# Patient Record
Sex: FEMALE | Race: WHITE | NOT HISPANIC OR LATINO | Employment: PART TIME | URBAN - METROPOLITAN AREA
[De-identification: names, ages, dates, MRNs, and addresses within clinical notes are randomized per-mention and may not be internally consistent; named-entity substitution may affect disease eponyms.]

---
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
## 2017-03-22 LAB
CHOLEST SERPL-MCNC: 196 MG/DL (ref 0–199)
HDLC SERPL-MCNC: 42 MG/DL
LDLC SERPL CALC-MCNC: 119 MG/DL (ref 19–130)
TRIGL SERPL-MCNC: 176 MG/DL (ref 4–149)

## 2017-04-13 LAB
CREAT SERPL-MCNC: 0.6 MG/DL (ref 0.55–1.02)
GFR SERPL CREATININE-BSD FRML MDRD: >60 ML/MIN/1.73M2
TSH SERPL-ACNC: 2.94 UIU/ML (ref 0.3–4.5)

## 2019-02-15 LAB
CHOLEST SERPL-MCNC: 153 MG/DL (ref 0–199)
CREAT SERPL-MCNC: 0.69 MG/DL (ref 0.55–1.02)
GFR SERPL CREATININE-BSD FRML MDRD: >60 ML/MIN/1.73M2
HDLC SERPL-MCNC: 43 MG/DL
LDLC SERPL CALC-MCNC: 100 MG/DL (ref 19–130)
NONHDLC SERPL-MCNC: 110 MG/DL (ref 0–159)
TRIGL SERPL-MCNC: 52 MG/DL (ref 4–149)
TSH SERPL-ACNC: 5.01 UIU/ML (ref 0.3–4.5)

## 2019-05-17 ENCOUNTER — TRANSFERRED RECORDS (OUTPATIENT)
Dept: HEALTH INFORMATION MANAGEMENT | Facility: CLINIC | Age: 24
End: 2019-05-17

## 2019-05-17 LAB — HBA1C MFR BLD: 8.1 % (ref 4–5.6)

## 2019-05-24 ENCOUNTER — OFFICE VISIT (OUTPATIENT)
Dept: PEDIATRICS | Facility: CLINIC | Age: 24
End: 2019-05-24
Payer: COMMERCIAL

## 2019-05-24 VITALS
BODY MASS INDEX: 29.03 KG/M2 | HEART RATE: 75 BPM | HEIGHT: 69 IN | WEIGHT: 196 LBS | OXYGEN SATURATION: 98 % | DIASTOLIC BLOOD PRESSURE: 74 MMHG | TEMPERATURE: 96.8 F | SYSTOLIC BLOOD PRESSURE: 107 MMHG

## 2019-05-24 DIAGNOSIS — K90.0 CELIAC DISEASE: ICD-10-CM

## 2019-05-24 DIAGNOSIS — E10.9 TYPE 1 DIABETES MELLITUS WITHOUT COMPLICATION (H): ICD-10-CM

## 2019-05-24 DIAGNOSIS — E06.3 HYPOTHYROIDISM DUE TO HASHIMOTO'S THYROIDITIS: ICD-10-CM

## 2019-05-24 DIAGNOSIS — I82.491 ACUTE DEEP VEIN THROMBOSIS (DVT) OF OTHER SPECIFIED VEIN OF RIGHT LOWER EXTREMITY (H): Primary | ICD-10-CM

## 2019-05-24 DIAGNOSIS — R76.0 ANTIPHOSPHOLIPID ANTIBODY POSITIVE: ICD-10-CM

## 2019-05-24 DIAGNOSIS — Z83.2 FAMILY HISTORY OF CLOTTING DISORDER: ICD-10-CM

## 2019-05-24 LAB
ALBUMIN SERPL-MCNC: 3.7 G/DL (ref 3.4–5)
ALP SERPL-CCNC: 101 U/L (ref 40–150)
ALT SERPL W P-5'-P-CCNC: 24 U/L (ref 0–50)
ANION GAP SERPL CALCULATED.3IONS-SCNC: 4 MMOL/L (ref 3–14)
AST SERPL W P-5'-P-CCNC: 18 U/L (ref 0–45)
BASOPHILS # BLD AUTO: 0 10E9/L (ref 0–0.2)
BASOPHILS NFR BLD AUTO: 0.7 %
BILIRUB SERPL-MCNC: 0.3 MG/DL (ref 0.2–1.3)
BUN SERPL-MCNC: 9 MG/DL (ref 7–30)
CALCIUM SERPL-MCNC: 9.5 MG/DL (ref 8.5–10.1)
CHLORIDE SERPL-SCNC: 105 MMOL/L (ref 94–109)
CO2 SERPL-SCNC: 29 MMOL/L (ref 20–32)
CREAT SERPL-MCNC: 0.66 MG/DL (ref 0.52–1.04)
DIFFERENTIAL METHOD BLD: NORMAL
EOSINOPHIL # BLD AUTO: 0.3 10E9/L (ref 0–0.7)
EOSINOPHIL NFR BLD AUTO: 5.8 %
ERYTHROCYTE [DISTWIDTH] IN BLOOD BY AUTOMATED COUNT: 12.3 % (ref 10–15)
GFR SERPL CREATININE-BSD FRML MDRD: >90 ML/MIN/{1.73_M2}
GLUCOSE SERPL-MCNC: 176 MG/DL (ref 70–99)
HCT VFR BLD AUTO: 37.2 % (ref 35–47)
HGB BLD-MCNC: 12.3 G/DL (ref 11.7–15.7)
IMM GRANULOCYTES # BLD: 0 10E9/L (ref 0–0.4)
IMM GRANULOCYTES NFR BLD: 0.5 %
LYMPHOCYTES # BLD AUTO: 1.8 10E9/L (ref 0.8–5.3)
LYMPHOCYTES NFR BLD AUTO: 32.4 %
MCH RBC QN AUTO: 28.9 PG (ref 26.5–33)
MCHC RBC AUTO-ENTMCNC: 33.1 G/DL (ref 31.5–36.5)
MCV RBC AUTO: 87 FL (ref 78–100)
MONOCYTES # BLD AUTO: 0.5 10E9/L (ref 0–1.3)
MONOCYTES NFR BLD AUTO: 8.6 %
NEUTROPHILS # BLD AUTO: 2.9 10E9/L (ref 1.6–8.3)
NEUTROPHILS NFR BLD AUTO: 52 %
PLATELET # BLD AUTO: 241 10E9/L (ref 150–450)
POTASSIUM SERPL-SCNC: 4 MMOL/L (ref 3.4–5.3)
PROT SERPL-MCNC: 8 G/DL (ref 6.8–8.8)
RBC # BLD AUTO: 4.26 10E12/L (ref 3.8–5.2)
SODIUM SERPL-SCNC: 138 MMOL/L (ref 133–144)
T4 FREE SERPL-MCNC: 1.11 NG/DL (ref 0.76–1.46)
TSH SERPL DL<=0.005 MIU/L-ACNC: 9.08 MU/L (ref 0.4–4)
WBC # BLD AUTO: 5.6 10E9/L (ref 4–11)

## 2019-05-24 PROCEDURE — 85025 COMPLETE CBC W/AUTO DIFF WBC: CPT | Performed by: FAMILY MEDICINE

## 2019-05-24 PROCEDURE — 86147 CARDIOLIPIN ANTIBODY EA IG: CPT | Performed by: FAMILY MEDICINE

## 2019-05-24 PROCEDURE — 84439 ASSAY OF FREE THYROXINE: CPT | Performed by: FAMILY MEDICINE

## 2019-05-24 PROCEDURE — 99204 OFFICE O/P NEW MOD 45 MIN: CPT | Performed by: FAMILY MEDICINE

## 2019-05-24 PROCEDURE — 00000167 ZZHCL STATISTIC INR NC: Performed by: FAMILY MEDICINE

## 2019-05-24 PROCEDURE — 85240 CLOT FACTOR VIII AHG 1 STAGE: CPT | Performed by: FAMILY MEDICINE

## 2019-05-24 PROCEDURE — 85303 CLOT INHIBIT PROT C ACTIVITY: CPT | Performed by: FAMILY MEDICINE

## 2019-05-24 PROCEDURE — 84443 ASSAY THYROID STIM HORMONE: CPT | Performed by: FAMILY MEDICINE

## 2019-05-24 PROCEDURE — 85306 CLOT INHIBIT PROT S FREE: CPT | Performed by: FAMILY MEDICINE

## 2019-05-24 PROCEDURE — 86146 BETA-2 GLYCOPROTEIN ANTIBODY: CPT | Performed by: FAMILY MEDICINE

## 2019-05-24 PROCEDURE — 85300 ANTITHROMBIN III ACTIVITY: CPT | Performed by: FAMILY MEDICINE

## 2019-05-24 PROCEDURE — 00000401 ZZHCL STATISTIC THROMBIN TIME NC: Performed by: FAMILY MEDICINE

## 2019-05-24 PROCEDURE — 80053 COMPREHEN METABOLIC PANEL: CPT | Performed by: FAMILY MEDICINE

## 2019-05-24 PROCEDURE — 81241 F5 GENE: CPT | Performed by: FAMILY MEDICINE

## 2019-05-24 PROCEDURE — 36415 COLL VENOUS BLD VENIPUNCTURE: CPT | Performed by: FAMILY MEDICINE

## 2019-05-24 RX ORDER — INSULIN GLARGINE 100 [IU]/ML
45 INJECTION, SOLUTION SUBCUTANEOUS
COMMUNITY
Start: 2019-02-15

## 2019-05-24 RX ORDER — INSULIN PUMP SYRINGE, 3 ML
EACH MISCELLANEOUS
COMMUNITY
Start: 2018-11-01

## 2019-05-24 RX ORDER — LEVOTHYROXINE SODIUM 137 UG/1
137 TABLET ORAL DAILY
Qty: 90 TABLET | Refills: 1 | Status: SHIPPED | OUTPATIENT
Start: 2019-05-24 | End: 2019-08-16

## 2019-05-24 RX ORDER — LEVOTHYROXINE SODIUM 125 UG/1
125 TABLET ORAL DAILY
COMMUNITY
Start: 2018-11-01 | End: 2019-05-24 | Stop reason: DRUGHIGH

## 2019-05-24 ASSESSMENT — MIFFLIN-ST. JEOR: SCORE: 1722.39

## 2019-05-24 NOTE — PROGRESS NOTES
Subjective     Ronna Coleman is a 23 year old female who presents to clinic today for the following health issues:    HPI   New Patient/Transfer of Care - Previous care at [facility] in Peds and [facility]    ED/UC Followup:  Patient is new to the provider, is here to establish care.  She is presenting today with her mother who sees this provider as PCP.  Patient has a past medical history significant for type 1 diabetes, Hashimoto's thyroiditis, celiac disease she is currently following up with endocrinology  [facility] clinic.  Patient is here for the follow-up on her recent ER visit for right leg pain.    Patient states she went to the [facility] urgent room for evaluation of right calf pain on 5/18/2019.  7 days prior to the visit, patient reportedly had a cough.    4 days later, patient developed acute onset of right calf pain associated with some swelling.    She did not recall any injury or trauma to the leg.    Patient did not have any concerns of fever, chills, chest pain, shortness of breath, palpitations, dizziness, hemoptysis.    She was not on any hormone replacement therapy for birth control at that time.    Patient did not have any history of recent travel, surgery or other extended embolization.    She denies previous history of DVT, PE.  Has family history of clotting disorder and maternal grandmother who had multiple episodes of DVT.  Patient had ultrasound of the ER showing occlusive DVT in the mid distal right femoral vein that continues to the popliteal and calf veins.  She was started on Xarelto, is currently on the 15 mg twice a day for the starting dose  Patient denies history of smoking, hyperlipidemia, hypertension.  She is compliant in taking her medications including levothyroxine 125 MCG for hypothyroidism.  Denies cold or heat intolerance, weight loss or gain, change in bowel movements, hair loss or thinning, chest pain, palpitations, SOB, edema legs or eyes, dry skin, memory  problems.  Patient has never been sexually active, not on any contraception            Facility:  [facility]  Date of visit: 5/18/2019  Reason for visit: DVT on right leg (behind right knee to ankle)  Current Status: Patient started on Xalreto and report less pain now.           Patient Active Problem List   Diagnosis     Hypothyroidism     Type 1 diabetes (H)     Celiac disease     Past Surgical History:   Procedure Laterality Date     NO HISTORY OF SURGERY         Social History     Tobacco Use     Smoking status: Never Smoker     Smokeless tobacco: Never Used   Substance Use Topics     Alcohol use: Not Currently     Family History   Problem Relation Age of Onset     Hypertension Father      Hypertension Maternal Grandfather      Hypertension Paternal Grandfather      Diabetes Paternal Uncle      Clotting Disorder Maternal Grandmother          Current Outpatient Medications   Medication Sig Dispense Refill     insulin glargine (BASAGLAR KWIKPEN) 100 UNIT/ML pen Inject 45 Units Subcutaneous       Insulin Infusion Pump Supplies (MINIMED RESERVOIR 3ML) MISC Change every 3 days, 3 boxes of 10 for 90 days       insulin lispro (HUMALOG) 100 UNIT/ML vial        levothyroxine (SYNTHROID/LEVOTHROID) 137 MCG tablet Take 1 tablet (137 mcg) by mouth daily 90 tablet 1     Rivaroxaban (XARELTO STARTER PACK) 15 & 20 MG TBPK Take 15 mg by mouth 2 times daily       rivaroxaban ANTICOAGULANT (XARELTO) 20 MG TABS tablet Take 1 tablet (20 mg) by mouth daily (with dinner) 90 tablet 0     No Known Allergies  Recent Labs   Lab Test 05/24/19  1138   ALT 24   CR 0.66   GFRESTIMATED >90   GFRESTBLACK >90   POTASSIUM 4.0   TSH 9.08*      BP Readings from Last 3 Encounters:   05/24/19 107/74    Wt Readings from Last 3 Encounters:   05/24/19 88.9 kg (196 lb)                      Reviewed and updated as needed this visit by Provider         Review of Systems   ROS COMP: CONSTITUTIONAL: NEGATIVE for fever, chills, change in  "weight  INTEGUMENTARY/SKIN: NEGATIVE for worrisome rashes, moles or lesions  RESP: NEGATIVE for significant cough or SOB  CV: NEGATIVE for chest pain, palpitations or peripheral edema  GI: NEGATIVE for nausea, abdominal pain, heartburn, or change in bowel habits  : normal menstrual cycles  MUSCULOSKELETAL: as above  NEURO: NEGATIVE for weakness, dizziness or paresthesias  ENDOCRINE: NEGATIVE for temperature intolerance, skin/hair changes, Hx diabetes and Hx thyroid disease  HEME/ALLERGY/IMMUNE: NEGATIVE for bleeding problems  PSYCHIATRIC: NEGATIVE for changes in mood or affect      Objective    /74 (BP Location: Right arm, Patient Position: Sitting, Cuff Size: Adult Large)   Pulse 75   Temp 96.8  F (36  C) (Tympanic)   Ht 1.759 m (5' 9.25\")   Wt 88.9 kg (196 lb)   LMP 05/16/2019 (Exact Date)   SpO2 98%   BMI 28.74 kg/m     Body mass index is 28.74 kg/m .  Physical Exam   GENERAL: healthy, alert and no distress  NECK: no adenopathy, no asymmetry, masses, or scars and thyroid normal to palpation  RESP: lungs clear to auscultation - no rales, rhonchi or wheezes  CV: regular rate and rhythm, normal S1 S2, no S3 or S4, no murmur, click or rub, no peripheral edema and peripheral pulses strong  MS: Left leg - normal  Right leg-nonpitting, grade 2 indurated swelling of the right lower leg, minimal tenderness, no warmth, redness noted, patient is wearing compression stockings    SKIN: no suspicious lesions or rashes  NEURO: Normal strength and tone, mentation intact and speech normal  PSYCH: mentation appears normal, affect normal/bright  Diabetic foot exam: normal DP and PT pulses, no trophic changes or ulcerative lesions and normal sensory exam    Diagnostic Test Results:  Labs reviewed in Epic  Results for orders placed or performed in visit on 05/24/19 (from the past 24 hour(s))   CBC with platelets and differential   Result Value Ref Range    WBC 5.6 4.0 - 11.0 10e9/L    RBC Count 4.26 3.8 - 5.2 10e12/L "    Hemoglobin 12.3 11.7 - 15.7 g/dL    Hematocrit 37.2 35.0 - 47.0 %    MCV 87 78 - 100 fl    MCH 28.9 26.5 - 33.0 pg    MCHC 33.1 31.5 - 36.5 g/dL    RDW 12.3 10.0 - 15.0 %    Platelet Count 241 150 - 450 10e9/L    Diff Method Automated Method     % Neutrophils 52.0 %    % Lymphocytes 32.4 %    % Monocytes 8.6 %    % Eosinophils 5.8 %    % Basophils 0.7 %    % Immature Granulocytes 0.5 %    Absolute Neutrophil 2.9 1.6 - 8.3 10e9/L    Absolute Lymphocytes 1.8 0.8 - 5.3 10e9/L    Absolute Monocytes 0.5 0.0 - 1.3 10e9/L    Absolute Eosinophils 0.3 0.0 - 0.7 10e9/L    Absolute Basophils 0.0 0.0 - 0.2 10e9/L    Abs Immature Granulocytes 0.0 0 - 0.4 10e9/L   Comprehensive metabolic panel   Result Value Ref Range    Sodium 138 133 - 144 mmol/L    Potassium 4.0 3.4 - 5.3 mmol/L    Chloride 105 94 - 109 mmol/L    Carbon Dioxide 29 20 - 32 mmol/L    Anion Gap 4 3 - 14 mmol/L    Glucose 176 (H) 70 - 99 mg/dL    Urea Nitrogen 9 7 - 30 mg/dL    Creatinine 0.66 0.52 - 1.04 mg/dL    GFR Estimate >90 >60 mL/min/[1.73_m2]    GFR Estimate If Black >90 >60 mL/min/[1.73_m2]    Calcium 9.5 8.5 - 10.1 mg/dL    Bilirubin Total 0.3 0.2 - 1.3 mg/dL    Albumin 3.7 3.4 - 5.0 g/dL    Protein Total 8.0 6.8 - 8.8 g/dL    Alkaline Phosphatase 101 40 - 150 U/L    ALT 24 0 - 50 U/L    AST 18 0 - 45 U/L   TSH with free T4 reflex   Result Value Ref Range    TSH 9.08 (H) 0.40 - 4.00 mU/L   T4 free   Result Value Ref Range    T4 Free 1.11 0.76 - 1.46 ng/dL           Assessment & Plan     1. Acute deep vein thrombosis (DVT) of right femoral vein  2019  May  US VENOUS LOWER EXTREMITY RIGHT5/18/2019  [facility]  Result Narrative   EXAM: US VENOUS LOWER EXTREMITY RIGHT  LOCATION: [facility] [place]  DATE/TIME: 5/18/2019 3:31 PM    INDICATION: Leg Pain/problem  COMPARISON: None.  TECHNIQUE: Routine exam without and with compression, augmentation, and duplex  utilizing 2D gray-scale imaging, Doppler  interrogation with color-flow and  spectral waveform analysis.    FINDINGS: The common femoral, femoral, popliteal, and segmentally visualized  calf veins were evaluated. The opposite CFV was also included in the evaluation.    Right leg veins are positive for occlusive DVT in the mid-distal right femoral  vein, continuing into the popliteal and calf veins. No popliteal cysts.    CONCLUSION:  1.  Right leg veins are positive for occlusive DVT in the mid-distal right  femoral vein which continues through the popliteal and calf veins. Findings  discussed by telephone with DEBBIE Interiano, at 1540 on 5/18/2019.       Reviewed documents and reports from care everywhere  Once patient is done with 3 weeks of Xarelto 50 mg twice a day, she will start on Xarelto 20 mg once daily for the next 3 months  Prescription sent today  Also recommended further evaluation of clotting disorder due to family history and non-provocative DVT in the absence of hormone therapy or other provocative factors including immobilization  Based on the lab results, will consider hematology consult  Will f/u on results and call with recommendations.  Reviewed prevention of recurrent DVT  Emphasized on hydration  Continue with the compression stockings    - rivaroxaban ANTICOAGULANT (XARELTO) 20 MG TABS tablet; Take 1 tablet (20 mg) by mouth daily (with dinner)  Dispense: 90 tablet; Refill: 0  - CBC with platelets and differential  - Comprehensive metabolic panel  - TSH with free T4 reflex  - Lupus Anticoagulant Panel  - Cardiolipin Barbara IgG and IgM  - Beta 2 Glycoprotein Antibodies IGG IGM  - Factor 5 leiden mutation analysis  - Protein C chromogenic  - Protein S Antigen Free  - Factor 8 assay  - Antithrombin III  - T4 free  - T4 free    2. Family history of clotting disorder  as above    - rivaroxaban ANTICOAGULANT (XARELTO) 20 MG TABS tablet; Take 1 tablet (20 mg) by mouth daily (with dinner)  Dispense: 90 tablet; Refill: 0  - CBC with platelets  "and differential  - Comprehensive metabolic panel  - TSH with free T4 reflex  - Lupus Anticoagulant Panel  - Cardiolipin Barbara IgG and IgM  - Beta 2 Glycoprotein Antibodies IGG IGM  - Factor 5 leiden mutation analysis  - Protein C chromogenic  - Protein S Antigen Free  - Factor 8 assay  - Antithrombin III  - T4 free  - T4 free    3. Hypothyroidism due to Hashimoto's thyroiditis  TSH   Date Value Ref Range Status   05/24/2019 9.08 (H) 0.40 - 4.00 mU/L Final     T4 Free   Date Value Ref Range Status   05/24/2019 1.11 0.76 - 1.46 ng/dL Final     Reviewed moderately elevated TSH that has been worse from 3 months ago when it was done at her endocrinologist clinic  Recommend patient to increase her dose of thyroxine from 125 mcg 137 mcg daily, will recheck thyroid labs at her physical appointment in 3 months  - levothyroxine (SYNTHROID/LEVOTHROID) 137 MCG tablet; Take 1 tablet (137 mcg) by mouth daily  Dispense: 90 tablet; Refill: 1    4. Type 1 diabetes mellitus without complication (H)  Reviewed last A1c of 8.1 that was done at the recent ER visit  Continue with endocrinology follow-up at [facility] clinic    5. Celiac disease  Continue with a gluten-free diet       BMI:   Estimated body mass index is 28.74 kg/m  as calculated from the following:    Height as of this encounter: 1.759 m (5' 9.25\").    Weight as of this encounter: 88.9 kg (196 lb).   Weight management plan: Discussed healthy diet and exercise guidelines        Chart documentation done in part with Dragon Voice recognition Software. Although reviewed after completion, some word and grammatical error may remain.    See Patient Instructions    Return in about 3 months (around 8/24/2019) for Physical Exam, Lab Work.    Jose Tierney MD  [facility]      "

## 2019-05-24 NOTE — RESULT ENCOUNTER NOTE
Abnormal thyroid labs, discussed with Ronna over phone on adjusting the dose of levothyroxine to 137 MCG daily, recheck thyroid labs in 3 months at her next visit

## 2019-05-25 PROBLEM — Z83.2 FAMILY HISTORY OF CLOTTING DISORDER: Status: ACTIVE | Noted: 2019-05-25

## 2019-05-25 PROBLEM — I82.491 ACUTE DEEP VEIN THROMBOSIS (DVT) OF OTHER SPECIFIED VEIN OF RIGHT LOWER EXTREMITY (H): Status: ACTIVE | Noted: 2019-05-25

## 2019-05-25 LAB
AT III ACT/NOR PPP CHRO: 108 % (ref 85–135)
CARDIOLIPIN ANTIBODY IGG: 52.8 GPL-U/ML (ref 0–19.9)
CARDIOLIPIN ANTIBODY IGM: 7.3 MPL-U/ML (ref 0–19.9)
FACT VIII ACT/NOR PPP: 227 % (ref 55–200)

## 2019-05-28 LAB
B2 GLYCOPROT1 IGG SERPL IA-ACNC: 6.5 U/ML
B2 GLYCOPROT1 IGM SERPL IA-ACNC: 6.7 U/ML
LA PPP-IMP: ABNORMAL
PROT C ACT/NOR PPP CHRO: 119 % (ref 70–170)
PROT S FREE AG ACT/NOR PPP IA: 79 % (ref 55–125)

## 2019-05-30 LAB — COPATH REPORT: NORMAL

## 2019-05-31 PROBLEM — R76.0 ANTIPHOSPHOLIPID ANTIBODY POSITIVE: Status: ACTIVE | Noted: 2019-05-31

## 2019-05-31 NOTE — TELEPHONE ENCOUNTER
ONCOLOGY INTAKE: Records Information      APPT INFORMATION:  Referring provider:  Jose Tierney MD  Referring provider s clinic:  [facility]  Reason for visit/diagnosis:  Acute deep vein thrombosis (DVT) of other specified vein of right lower extremity (H) [I82.491]; Antiphospholipid antibody positive [R76.0]  Has patient been notified of appointment date and time?: Yes    RECORDS INFORMATION:  Were the records received with the referral (via [employer])? No    Has patient been seen for any external appt for this diagnosis? No    If yes, where? NA    Has patient had any imaging or procedures outside of [place] for this condition? NO      If Yes, where? NA    ADDITIONAL INFORMATION:  NONE

## 2019-05-31 NOTE — RESULT ENCOUNTER NOTE
Dear Ronna,  Your lab tests indicated elevated phospholipid antibodies which is one of the autoimmune condition that predisposes someone to develop blood clots.  I would recommend we consult hematologist for further recommendations to determine the long-term need for blood thinners.  I placed a consult, please call the clinic to schedule for the hematology appointment    Let me know if you have any questions. Take care.  Jose Tierney MD

## 2019-06-03 NOTE — TELEPHONE ENCOUNTER
RECORDS STATUS - ALL OTHER DIAGNOSIS      RECORDS RECEIVED FROM: Epic/   DATE RECEIVED: 6/11/19   NOTES STATUS DETAILS   OFFICE NOTE from referring provider Complete  Jose Tierney MD     OFFICE NOTE from medical oncologist N/A    DISCHARGE SUMMARY from hospital N/A    DISCHARGE REPORT from the ER Complete  Epic-5/18/19   OPERATIVE REPORT N/A    MEDICATION LIST Complete  [facility]   CLINICAL TRIAL TREATMENTS TO DATE N/A    LABS     PATHOLOGY REPORTS N/A    ANYTHING RELATED TO DIAGNOSIS     GENONOMIC TESTING     TYPE: Complete  5/24/19   IMAGING (NEED IMAGES & REPORT)     CT SCANS     MRI     MAMMO     ULTRASOUND Request  5/18/29 [phone]     PET       Action    Action Taken 6/3/19 9:23am     Called [facility] and left a vm for the Radiology Dept requesting a call back.     Pending: US IMG ([facility] Radiology).      Action    Action Taken 6/5/19 10:21am     Left a vm for the Radiology Dept to check on the IMG request. Received a call back and was transferred to a different dept. US IMG will be pushed to  PACS soon.

## 2019-06-11 ENCOUNTER — PRE VISIT (OUTPATIENT)
Dept: ONCOLOGY | Facility: CLINIC | Age: 24
End: 2019-06-11

## 2019-06-11 ENCOUNTER — ONCOLOGY VISIT (OUTPATIENT)
Dept: ONCOLOGY | Facility: CLINIC | Age: 24
End: 2019-06-11
Attending: FAMILY MEDICINE
Payer: COMMERCIAL

## 2019-06-11 ENCOUNTER — PATIENT OUTREACH (OUTPATIENT)
Dept: ONCOLOGY | Facility: CLINIC | Age: 24
End: 2019-06-11

## 2019-06-11 VITALS
DIASTOLIC BLOOD PRESSURE: 75 MMHG | HEIGHT: 69 IN | SYSTOLIC BLOOD PRESSURE: 123 MMHG | TEMPERATURE: 98.1 F | RESPIRATION RATE: 16 BRPM | BODY MASS INDEX: 28.72 KG/M2 | HEART RATE: 68 BPM | OXYGEN SATURATION: 99 % | WEIGHT: 193.94 LBS

## 2019-06-11 DIAGNOSIS — I82.491 ACUTE DEEP VEIN THROMBOSIS (DVT) OF OTHER SPECIFIED VEIN OF RIGHT LOWER EXTREMITY (H): Primary | ICD-10-CM

## 2019-06-11 PROCEDURE — 99204 OFFICE O/P NEW MOD 45 MIN: CPT | Performed by: INTERNAL MEDICINE

## 2019-06-11 ASSESSMENT — MIFFLIN-ST. JEOR: SCORE: 1713.07

## 2019-06-11 ASSESSMENT — PAIN SCALES - GENERAL: PAINLEVEL: NO PAIN (0)

## 2019-06-11 NOTE — LETTER
6/11/2019         RE: Ronna Coleman  [address]  [place] [address]        Dear Colleague,    Thank you for referring your patient, Ronna Coleman, to the [facility]. Please see a copy of my visit note below.    Hematology initial visit:  Date on this visit: 6/11/2019    Ronna Coleman  is referred by Dr.Sarulatha HARRISON Tierney for a hematology consultation. She requires evaluation for new diagnosis of DVT.    Primary Physician: Jose Tierney     History Of Present Illness:  Ms. Coleman is a 23 year old female who recently was found to have Right sided occlusive DVT in the mid-distal femoral vein which continues through the popliteal and calf veins.  She has a history of type 1 diabetes, celiac disease and hypothyroidism but she was doing well and she was in her usual state of health when around mid May 2019 she started to noticing stretching and tension in her right calf area and it was difficult for her to walk properly.  There was also some swelling associated.  She did not notice any skin discoloration.  On 5/18/2019 she had the ultrasound done which showed the clot as mentioned above.  At that point she was started on Xarelto.  Within a week of starting Xarelto the swelling resolved and about after 2-1/2 weeks she noticed that the pain has also almost completely resolved.  During this time she had a work-up done which showed unremarkable CBC/comprehensive metabolic panel.  She was found to have positive anticardiolipin IgG antibody of 52.8.  Anticardiolipin IgM antibody was negative beta-2 glycoprotein antibodies were negative.  Lupus anticoagulant was not tested because she was on Xarelto.  Factor VIII assay was mildly elevated at 227.  Antithrombin III, protein C, protein S were unremarkable.  Factor V Leiden was negative.  Factor II mutation was not tested.    She comes in today accompanied by her mother and now feels well.  She denies any provoking factors  prior to development of the blood clot like long travels, recent illness or bedbound illness or recent surgeries or injuries.  She never had issues with dyspnea.  She is tolerating Xarelto well without any bleeding issues.  Otherwise she feels well.  Denies B symptoms.  No other swellings.  No other skin problems.  No infections.  She never had history of blood clots previously.  She has never become pregnant.      ROS:  A comprehensive ROS was otherwise neg      Past Medical/Surgical History:  Past Medical History:   Diagnosis Date     Celiac disease      Hypothyroidism      Type 1 diabetes (H)      Past Surgical History:   Procedure Laterality Date     NO HISTORY OF SURGERY       Allergies:  Allergies as of 06/11/2019     (No Known Allergies)     Current Medications:  Current Outpatient Medications   Medication Sig Dispense Refill     insulin glargine (BASAGLAR KWIKPEN) 100 UNIT/ML pen Inject 45 Units Subcutaneous       Insulin Infusion Pump Supplies (MINIMED RESERVOIR 3ML) MISC Change every 3 days, 3 boxes of 10 for 90 days       insulin lispro (HUMALOG) 100 UNIT/ML vial        levothyroxine (SYNTHROID/LEVOTHROID) 137 MCG tablet Take 1 tablet (137 mcg) by mouth daily 90 tablet 1     Rivaroxaban (XARELTO STARTER PACK) 15 & 20 MG TBPK Take 15 mg by mouth 2 times daily       rivaroxaban ANTICOAGULANT (XARELTO) 20 MG TABS tablet Take 1 tablet (20 mg) by mouth daily (with dinner) (Patient not taking: Reported on 6/11/2019) 90 tablet 0      Family History:  Family History   Problem Relation Age of Onset     Hypertension Father      Hypertension Maternal Grandfather      Hypertension Paternal Grandfather      Diabetes Paternal Uncle      Clotting Disorder Maternal Grandmother      Maternal grandmother had developed blood clots in her mid 70s.  She has 2 siblings who are healthy.  Mother had varicose veins and superficial phlebitis after a pregnancy.  Social History:  Social History     Socioeconomic History     Marital  "status: Single     Spouse name: Not on file     Number of children: Not on file     Years of education: Not on file     Highest education level: Not on file   Occupational History     Not on file   Social Needs     Financial resource strain: Not on file     Food insecurity:     Worry: Not on file     Inability: Not on file     Transportation needs:     Medical: Not on file     Non-medical: Not on file   Tobacco Use     Smoking status: Never Smoker     Smokeless tobacco: Never Used   Substance and Sexual Activity     Alcohol use: Not Currently     Drug use: Not Currently     Sexual activity: Not on file   Lifestyle     Physical activity:     Days per week: Not on file     Minutes per session: Not on file     Stress: Not on file   Relationships     Social connections:     Talks on phone: Not on file     Gets together: Not on file     Attends Cheondo[employer] service: Not on file     Active member of club or organization: Not on file     Attends meetings of clubs or organizations: Not on file     Relationship status: Not on file     Intimate partner violence:     Fear of current or ex partner: Not on file     Emotionally abused: Not on file     Physically abused: Not on file     Forced sexual activity: Not on file   Other Topics Concern     Not on file   Social History Narrative     Not on file     Physical Exam:  /75 (BP Location: Right arm)   Pulse 68   Temp 98.1  F (36.7  C) (Oral)   Resp 16   Ht 1.759 m (5' 9.25\")   Wt 88 kg (193 lb 15 oz)   LMP 05/16/2019 (Exact Date)   SpO2 99%   BMI 28.43 kg/m     CONSTITUTIONAL: no acute distress  EYES: PERRLA, no palor or icterus.   ENT/MOUTH: no mouth lesions. Ears normal  CVS: s1s2 no m r g .   RESPIRATORY: clear to auscultation b/l  GI: soft non tender no hepatosplenomegaly  NEURO: AAOX3  Grossly non focal neuro exam  INTEGUMENT: no obvious rashes  LYMPHATIC: no palpable cervical, supraclavicular, axillary or inguinal LAD  MUSCULOSKELETAL: Unremarkable. No bony " tenderness.   EXTREMITIES: no edema.  No calf tenderness.  No swelling or discoloration.  PSYCH: Mentation, mood and affect are normal. Decision making capacity is intact    Laboratory/Imaging Studies  Results for orders placed or performed in visit on 05/24/19   CBC with platelets and differential   Result Value Ref Range    WBC 5.6 4.0 - 11.0 10e9/L    RBC Count 4.26 3.8 - 5.2 10e12/L    Hemoglobin 12.3 11.7 - 15.7 g/dL    Hematocrit 37.2 35.0 - 47.0 %    MCV 87 78 - 100 fl    MCH 28.9 26.5 - 33.0 pg    MCHC 33.1 31.5 - 36.5 g/dL    RDW 12.3 10.0 - 15.0 %    Platelet Count 241 150 - 450 10e9/L    Diff Method Automated Method     % Neutrophils 52.0 %    % Lymphocytes 32.4 %    % Monocytes 8.6 %    % Eosinophils 5.8 %    % Basophils 0.7 %    % Immature Granulocytes 0.5 %    Absolute Neutrophil 2.9 1.6 - 8.3 10e9/L    Absolute Lymphocytes 1.8 0.8 - 5.3 10e9/L    Absolute Monocytes 0.5 0.0 - 1.3 10e9/L    Absolute Eosinophils 0.3 0.0 - 0.7 10e9/L    Absolute Basophils 0.0 0.0 - 0.2 10e9/L    Abs Immature Granulocytes 0.0 0 - 0.4 10e9/L   Comprehensive metabolic panel   Result Value Ref Range    Sodium 138 133 - 144 mmol/L    Potassium 4.0 3.4 - 5.3 mmol/L    Chloride 105 94 - 109 mmol/L    Carbon Dioxide 29 20 - 32 mmol/L    Anion Gap 4 3 - 14 mmol/L    Glucose 176 (H) 70 - 99 mg/dL    Urea Nitrogen 9 7 - 30 mg/dL    Creatinine 0.66 0.52 - 1.04 mg/dL    GFR Estimate >90 >60 mL/min/[1.73_m2]    GFR Estimate If Black >90 >60 mL/min/[1.73_m2]    Calcium 9.5 8.5 - 10.1 mg/dL    Bilirubin Total 0.3 0.2 - 1.3 mg/dL    Albumin 3.7 3.4 - 5.0 g/dL    Protein Total 8.0 6.8 - 8.8 g/dL    Alkaline Phosphatase 101 40 - 150 U/L    ALT 24 0 - 50 U/L    AST 18 0 - 45 U/L   TSH with free T4 reflex   Result Value Ref Range    TSH 9.08 (H) 0.40 - 4.00 mU/L   Lupus Anticoagulant Panel   Result Value Ref Range    Lupus Result Canceled, Test credited (A) NEG^Negative   Cardiolipin Barbara IgG and IgM   Result Value Ref Range    Cardiolipin  Antibody IgG 52.8 (H) 0.0 - 19.9 GPL-U/mL    Cardiolipin Antibody IgM 7.3 0.0 - 19.9 MPL-U/mL   Beta 2 Glycoprotein Antibodies IGG IGM   Result Value Ref Range    Beta 2 Glycoprotein 1 Antibody IgG 6.5 <7 U/mL    Beta 2 Glycoprotein 1 Antibody IgM 6.7 <7 U/mL   Factor 5 leiden mutation analysis   Result Value Ref Range    Copath Report       Patient Name: JEANCARLOS GARCIA  MR#: [id number]  Specimen #: [id number]  Collected: 5/24/2019 11:39  Received: 5/28/2019 08:53  Reported: 5/30/2019 15:31  Ordering Phy(s): DONTE KING    For improved result formatting, select 'View Enhanced Report Format' under   Linked Documents section.  _________________________________________    TEST(S) REQUESTED:  Factor 5 Leiden Mutation by PCR    SPECIMEN DESCRIPTION:  Blood    METHODOLOGY:   The regions of genomic DNA containing the [id number] Factor V   Leiden gene mutation (Factor V  Leiden) and the Factor 2(Prothrombin H16721O) gene mutation were   simultaneously amplified using the polymerase  chain reaction.  The amplified products were digested with restriction   endonuclease TaqI and products were  analyzed by gel electrophoresis.    RESULTS:    Factor V 1691G>A (Leiden)  RESULTS:  Mutation analyzed:     1691G>A  Factor V 1691G>A (Leiden)  Interpretation:      ABSENT  Factor V 1691G>A (Leiden) mutation  genotype:      G/G    INTERPRETA TION:  The patient is negative for the Factor V 1691G>A (Leiden) mutation.    COMMENTS:  If a patient is the recipient of an allogeneic bone marrow transplant,   this test must be done on a  pre-transplant sample or buccal swab.  A previous allogeneic bone marrow   transplant will interfere with test  results.  Call the [facility]([phone]) for   instructions on sample collection for these  patients.    This test was developed and its performance characteristics determined by   the [facility],  [facility]. It has not been  cleared or   approved by the FDA. The laboratory is  regulated under CLIA as qualified to perform high-complexity testing. This   test is used for clinical purposes.  It should not be regarded as investigational or for research.    A resident/fellow in an accredited training program was involved in the   selection of testing, review of  laboratory data, and/or interpretation of this case.  I, as the  senior   physician, attest that I: (i) confirmed  appropriate testing, (ii) examined the relevant raw data for the   specimen(s); and (iii) rendered or confirmed  the interpretation(s).    Electronically Signed Out By:  Otoniel Fournier M.D., PhD  Physiclair Maya, Ph.D,  [facility]    CPT Codes:  A: 60036-M4RBG, -XREZMM    TESTING LAB LOCATION:  [address] [address]  [phone]    COLLECTION SITE:  Client:  [facility]  Location:  [facility] (B)     Protein C chromogenic   Result Value Ref Range    Prot C Chromogenic 119 70 - 170 %   Protein S Antigen Free   Result Value Ref Range    Protein S Free 79 55 - 125 %   Factor 8 assay   Result Value Ref Range    Factor 8 Assay 227 (H) 55 - 200 %   Antithrombin III   Result Value Ref Range    Antithrombin III Chromogenic 108 85 - 135 %   T4 free   Result Value Ref Range    T4 Free 1.11 0.76 - 1.46 ng/dL       EXAM: US VENOUS LOWER EXTREMITY RIGHT  LOCATION: [facility]  DATE/TIME: 5/18/2019 3:31 PM    INDICATION: Leg Pain/problem  COMPARISON: None.  TECHNIQUE: Routine exam without and with compression, augmentation, and duplex  utilizing 2D gray-scale imaging, Doppler interrogation with color-flow and  spectral waveform analysis.    FINDINGS: The common femoral, femoral, popliteal, and segmentally visualized  calf veins were evaluated. The opposite CFV was also included in the evaluation.    Right leg veins are  positive for occlusive DVT in the mid-distal right femoral  vein, continuing into the popliteal and calf veins. No popliteal cysts.    CONCLUSION:  1.  Right leg veins are positive for occlusive DVT in the mid-distal right  femoral vein which continues through the popliteal and calf veins.      ASSESSMENT/PLAN:    She has developed a occlusive DVT in the mid distal right femoral vein continuing through the popliteal and calf veins.  She has been started on Xarelto and clinically her symptoms have resolved.    Her testing reveals that she has positive anticardiolipin IgG antibodies.  Anti-beta-2 glycoprotein antibodies are negative.  Lupus anticoagulant was unable to be tested because she is on Xarelto.  She could have antiphospholipid antibody syndrome but in order to diagnose this we need to have persistent laboratory evidence of the antibodies and these should be tested at least 12 weeks apart.  Xarelto is a good medication for venous thromboembolism but in patients who have antiphospholipid antibody syndrome, Xarelto as a higher rate of failure and because there is a suspicion that she could actually have antiphospholipid antibody syndrome, I would like to switch her to Lovenox/warfarin to keep her INR between 2-3.  Patients who have 1 type of autoimmune disease are more prone to develop other autoimmune diseases.  She has type 1 diabetes, celiac disease as well as hypothyroidism so she has an increased risk of developing antiphospholipid antibody syndrome.  She has just started taking Xarelto once a day and I would recommend that she starts taking Lovenox instead of Xarelto when she is due for the next dose of Xarelto which would be tonight.  I would also like that she starts Coumadin at that time and we will have her managed by the Coumadin clinic.  I would like INR to be between 2-3.  There is a minority of patients with antiphospholipid antibody syndrome in which INR is not reliable in these patients need to  be followed by checking chromogenic factor X.  At this time most likely her INR would be elevated because of Xarelto so I did not check it today.    Going forward I would like to repeat her labs including anticardiolipin antibodies and lupus anticoagulant in about 3 months.  At that time we could also repeat factor VIII level and check factor II mutation.  Recent factor V Leiden mutation, Antithrombin III, protein C and protein S were unremarkable.  I would also like to repeat ultrasound of the right lower extremity in about 3 months to get another baseline.    Discussion regarding duration of anticoagulation.  At this time I would like her to continue the anticoagulation and most likely if she is found to have antiphospholipid antibody syndrome, we would recommend indefinite anticoagulation.  Even if she is not found to have antiphospholipid antibody syndrome, due to unprovoked nature of the blood clot, I would consider indefinite anticoagulation but we will need to make this determination later on.    I would like to see her back after her repeat blood test and ultrasound in about 3 months.    I answered all of her and her mother's questions to their satisfaction.  They are agreeable and comfortable with the plan.    Ameya Bacon          Again, thank you for allowing me to participate in the care of your patient.        Sincerely,        Ameya Bacon MD

## 2019-06-11 NOTE — PATIENT INSTRUCTIONS
Start Lovenox twice daily in place of Xarelto    Refer to Anticoagulation clinic to manage warfarin/INR    Around end of Aug, repeat labs and US and see me a few days after that

## 2019-06-11 NOTE — PROGRESS NOTES
Per conversation with mom, Glenny, patient was unable to get into the [place] [facility]R clinic until Monday, 6/17; patient called [facility] and now will be seen here by our INR nurse this Friday.  Lovenox RX will remain here at [facility] to be filled.    Patient plans to call to schedule her appointments for August with Dr. Bacon including labs.

## 2019-06-11 NOTE — PROGRESS NOTES
Met with patient after visit with  for DVT; introduced self as RN coordinator and provided patient with business card of self and provider.  Ronna is accompanied by her mom today and feels overwhelmed. She is tearful.  She is currently on Xarelto, however, per consultation note and other health issues, Dr. Bacon would like patient to go on Lovenox and start Coumadin.  Patient needs immediate referral to INR clinic.  She would like to be seen in the [facility] and will be seen there this Thursday.  She was given written and verbal instructions on administration of Lovenox; she gives herself Insulin so she is familiar with self-injection.  She would also like her Rx for Lovenox transferred to the [facility] [place] [facility] [address].  Writer will arrange for transfer.

## 2019-06-11 NOTE — PROGRESS NOTES
Hematology initial visit:  Date on this visit: 6/11/2019    Ronna Coleman  is referred by Dr.Sarulatha HARRISON Tierney for a hematology consultation. She requires evaluation for new diagnosis of DVT.    Primary Physician: Jose Tierney     History Of Present Illness:  Ms. Coleman is a 23 year old female who recently was found to have Right sided occlusive DVT in the mid-distal femoral vein which continues through the popliteal and calf veins.  She has a history of type 1 diabetes, celiac disease and hypothyroidism but she was doing well and she was in her usual state of health when around mid May 2019 she started to noticing stretching and tension in her right calf area and it was difficult for her to walk properly.  There was also some swelling associated.  She did not notice any skin discoloration.  On 5/18/2019 she had the ultrasound done which showed the clot as mentioned above.  At that point she was started on Xarelto.  Within a week of starting Xarelto the swelling resolved and about after 2-1/2 weeks she noticed that the pain has also almost completely resolved.  During this time she had a work-up done which showed unremarkable CBC/comprehensive metabolic panel.  She was found to have positive anticardiolipin IgG antibody of 52.8.  Anticardiolipin IgM antibody was negative beta-2 glycoprotein antibodies were negative.  Lupus anticoagulant was not tested because she was on Xarelto.  Factor VIII assay was mildly elevated at 227.  Antithrombin III, protein C, protein S were unremarkable.  Factor V Leiden was negative.  Factor II mutation was not tested.    She comes in today accompanied by her mother and now feels well.  She denies any provoking factors prior to development of the blood clot like long travels, recent illness or bedbound illness or recent surgeries or injuries.  She never had issues with dyspnea.  She is tolerating Xarelto well without any bleeding issues.  Otherwise she feels well.  Denies B  symptoms.  No other swellings.  No other skin problems.  No infections.  She never had history of blood clots previously.  She has never become pregnant.      ROS:  A comprehensive ROS was otherwise neg      Past Medical/Surgical History:  Past Medical History:   Diagnosis Date     Celiac disease      Hypothyroidism      Type 1 diabetes (H)      Past Surgical History:   Procedure Laterality Date     NO HISTORY OF SURGERY       Allergies:  Allergies as of 06/11/2019     (No Known Allergies)     Current Medications:  Current Outpatient Medications   Medication Sig Dispense Refill     insulin glargine (BASAGLAR KWIKPEN) 100 UNIT/ML pen Inject 45 Units Subcutaneous       Insulin Infusion Pump Supplies (MINIMED RESERVOIR 3ML) MISC Change every 3 days, 3 boxes of 10 for 90 days       insulin lispro (HUMALOG) 100 UNIT/ML vial        levothyroxine (SYNTHROID/LEVOTHROID) 137 MCG tablet Take 1 tablet (137 mcg) by mouth daily 90 tablet 1     Rivaroxaban (XARELTO STARTER PACK) 15 & 20 MG TBPK Take 15 mg by mouth 2 times daily       rivaroxaban ANTICOAGULANT (XARELTO) 20 MG TABS tablet Take 1 tablet (20 mg) by mouth daily (with dinner) (Patient not taking: Reported on 6/11/2019) 90 tablet 0      Family History:  Family History   Problem Relation Age of Onset     Hypertension Father      Hypertension Maternal Grandfather      Hypertension Paternal Grandfather      Diabetes Paternal Uncle      Clotting Disorder Maternal Grandmother      Maternal grandmother had developed blood clots in her mid 70s.  She has 2 siblings who are healthy.  Mother had varicose veins and superficial phlebitis after a pregnancy.  Social History:  Social History     Socioeconomic History     Marital status: Single     Spouse name: Not on file     Number of children: Not on file     Years of education: Not on file     Highest education level: Not on file   Occupational History     Not on file   Social Needs     Financial resource strain: Not on file      "Food insecurity:     Worry: Not on file     Inability: Not on file     Transportation needs:     Medical: Not on file     Non-medical: Not on file   Tobacco Use     Smoking status: Never Smoker     Smokeless tobacco: Never Used   Substance and Sexual Activity     Alcohol use: Not Currently     Drug use: Not Currently     Sexual activity: Not on file   Lifestyle     Physical activity:     Days per week: Not on file     Minutes per session: Not on file     Stress: Not on file   Relationships     Social connections:     Talks on phone: Not on file     Gets together: Not on file     Attends Denominational service: Not on file     Active member of club or organization: Not on file     Attends meetings of clubs or organizations: Not on file     Relationship status: Not on file     Intimate partner violence:     Fear of current or ex partner: Not on file     Emotionally abused: Not on file     Physically abused: Not on file     Forced sexual activity: Not on file   Other Topics Concern     Not on file   Social History Narrative     Not on file     Physical Exam:  /75 (BP Location: Right arm)   Pulse 68   Temp 98.1  F (36.7  C) (Oral)   Resp 16   Ht 1.759 m (5' 9.25\")   Wt 88 kg (193 lb 15 oz)   LMP 05/16/2019 (Exact Date)   SpO2 99%   BMI 28.43 kg/m    CONSTITUTIONAL: no acute distress  EYES: PERRLA, no palor or icterus.   ENT/MOUTH: no mouth lesions. Ears normal  CVS: s1s2 no m r g .   RESPIRATORY: clear to auscultation b/l  GI: soft non tender no hepatosplenomegaly  NEURO: AAOX3  Grossly non focal neuro exam  INTEGUMENT: no obvious rashes  LYMPHATIC: no palpable cervical, supraclavicular, axillary or inguinal LAD  MUSCULOSKELETAL: Unremarkable. No bony tenderness.   EXTREMITIES: no edema.  No calf tenderness.  No swelling or discoloration.  PSYCH: Mentation, mood and affect are normal. Decision making capacity is intact    Laboratory/Imaging Studies  Results for orders placed or performed in visit on 05/24/19 "   CBC with platelets and differential   Result Value Ref Range    WBC 5.6 4.0 - 11.0 10e9/L    RBC Count 4.26 3.8 - 5.2 10e12/L    Hemoglobin 12.3 11.7 - 15.7 g/dL    Hematocrit 37.2 35.0 - 47.0 %    MCV 87 78 - 100 fl    MCH 28.9 26.5 - 33.0 pg    MCHC 33.1 31.5 - 36.5 g/dL    RDW 12.3 10.0 - 15.0 %    Platelet Count 241 150 - 450 10e9/L    Diff Method Automated Method     % Neutrophils 52.0 %    % Lymphocytes 32.4 %    % Monocytes 8.6 %    % Eosinophils 5.8 %    % Basophils 0.7 %    % Immature Granulocytes 0.5 %    Absolute Neutrophil 2.9 1.6 - 8.3 10e9/L    Absolute Lymphocytes 1.8 0.8 - 5.3 10e9/L    Absolute Monocytes 0.5 0.0 - 1.3 10e9/L    Absolute Eosinophils 0.3 0.0 - 0.7 10e9/L    Absolute Basophils 0.0 0.0 - 0.2 10e9/L    Abs Immature Granulocytes 0.0 0 - 0.4 10e9/L   Comprehensive metabolic panel   Result Value Ref Range    Sodium 138 133 - 144 mmol/L    Potassium 4.0 3.4 - 5.3 mmol/L    Chloride 105 94 - 109 mmol/L    Carbon Dioxide 29 20 - 32 mmol/L    Anion Gap 4 3 - 14 mmol/L    Glucose 176 (H) 70 - 99 mg/dL    Urea Nitrogen 9 7 - 30 mg/dL    Creatinine 0.66 0.52 - 1.04 mg/dL    GFR Estimate >90 >60 mL/min/[1.73_m2]    GFR Estimate If Black >90 >60 mL/min/[1.73_m2]    Calcium 9.5 8.5 - 10.1 mg/dL    Bilirubin Total 0.3 0.2 - 1.3 mg/dL    Albumin 3.7 3.4 - 5.0 g/dL    Protein Total 8.0 6.8 - 8.8 g/dL    Alkaline Phosphatase 101 40 - 150 U/L    ALT 24 0 - 50 U/L    AST 18 0 - 45 U/L   TSH with free T4 reflex   Result Value Ref Range    TSH 9.08 (H) 0.40 - 4.00 mU/L   Lupus Anticoagulant Panel   Result Value Ref Range    Lupus Result Canceled, Test credited (A) NEG^Negative   Cardiolipin Barbara IgG and IgM   Result Value Ref Range    Cardiolipin Antibody IgG 52.8 (H) 0.0 - 19.9 GPL-U/mL    Cardiolipin Antibody IgM 7.3 0.0 - 19.9 MPL-U/mL   Beta 2 Glycoprotein Antibodies IGG IGM   Result Value Ref Range    Beta 2 Glycoprotein 1 Antibody IgG 6.5 <7 U/mL    Beta 2 Glycoprotein 1 Antibody IgM 6.7 <7 U/mL    Factor 5 leiden mutation analysis   Result Value Ref Range    Copath Report       Patient Name: JEANCARLOS GARCIA  MR#: [id number]  Specimen #: [id number]  Collected: 5/24/2019 11:39  Received: 5/28/2019 08:53  Reported: 5/30/2019 15:31  Ordering Phy(s): DONTE KING    For improved result formatting, select 'View Enhanced Report Format' under   Linked Documents section.  _________________________________________    TEST(S) REQUESTED:  Factor 5 Leiden Mutation by PCR    SPECIMEN DESCRIPTION:  Blood    METHODOLOGY:   The regions of genomic DNA containing the C2802U Factor V   Leiden gene mutation (Factor V  Leiden) and the Factor 2(Prothrombin Z46828G) gene mutation were   simultaneously amplified using the polymerase  chain reaction.  The amplified products were digested with restriction   endonuclease TaqI and products were  analyzed by gel electrophoresis.    RESULTS:    Factor V 1691G>A (Leiden)  RESULTS:  Mutation analyzed:     1691G>A  Factor V 1691G>A (Leiden)  Interpretation:      ABSENT  Factor V 1691G>A (Leiden) mutation  genotype:      G/G    INTERPRETA TION:  The patient is negative for the Factor V 1691G>A (Leiden) mutation.    COMMENTS:  If a patient is the recipient of an allogeneic bone marrow transplant,   this test must be done on a  pre-transplant sample or buccal swab.  A previous allogeneic bone marrow   transplant will interfere with test  results.  Call the [facility]([phone]) for   instructions on sample collection for these  patients.    This test was developed and its performance characteristics determined by   the [facility],  [facility]. It has not been cleared or   approved by the FDA. The laboratory is  regulated under CLIA as qualified to perform high-complexity testing. This   test is used for clinical purposes.  It should not be regarded as investigational or for research.    A resident/fellow in an accredited  training program was involved in the   selection of testing, review of  laboratory data, and/or interpretation of this case.  I, as the  senior   physician, attest that I: (i) confirmed  appropriate testing, (ii) examined the relevant raw data for the   specimen(s); and (iii) rendered or confirmed  the interpretation(s).    Electronically Signed Out By:  Otoniel Fournier M.D., PhD  [facility]angel[facility] Maya, Ph.D,  [place]    CPT Codes:  A: 68213-U7EWE, -DBVSPP    TESTING LAB LOCATION:  [place] [id number] [facility] [address] [place] [address] [phone]    COLLECTION SITE:  Client:  [facility]  [place]  [facility]     Protein C chromogenic   Result Value Ref Range    Prot C Chromogenic 119 70 - 170 %   Protein S Antigen Free   Result Value Ref Range    Protein S Free 79 55 - 125 %   Factor 8 assay   Result Value Ref Range    Factor 8 Assay 227 (H) 55 - 200 %   Antithrombin III   Result Value Ref Range    Antithrombin III Chromogenic 108 85 - 135 %   T4 free   Result Value Ref Range    T4 Free 1.11 0.76 - 1.46 ng/dL       EXAM: US VENOUS LOWER EXTREMITY RIGHT  LOCATION: [facility]  DATE/TIME: 5/18/2019 3:31 PM    INDICATION: Leg Pain/problem  COMPARISON: None.  TECHNIQUE: Routine exam without and with compression, augmentation, and duplex  utilizing 2D gray-scale imaging, Doppler interrogation with color-flow and  spectral waveform analysis.    FINDINGS: The common femoral, femoral, popliteal, and segmentally visualized  calf veins were evaluated. The opposite CFV was also included in the evaluation.    Right leg veins are positive for occlusive DVT in the mid-distal right femoral  vein, continuing into the popliteal and calf veins. No popliteal cysts.    CONCLUSION:  1.  Right leg veins are positive for occlusive DVT in the mid-distal right  femoral vein which continues through the  popliteal and calf veins.      ASSESSMENT/PLAN:    She has developed a occlusive DVT in the mid distal right femoral vein continuing through the popliteal and calf veins.  She has been started on Xarelto and clinically her symptoms have resolved.    Her testing reveals that she has positive anticardiolipin IgG antibodies.  Anti-beta-2 glycoprotein antibodies are negative.  Lupus anticoagulant was unable to be tested because she is on Xarelto.  She could have antiphospholipid antibody syndrome but in order to diagnose this we need to have persistent laboratory evidence of the antibodies and these should be tested at least 12 weeks apart.  Xarelto is a good medication for venous thromboembolism but in patients who have antiphospholipid antibody syndrome, Xarelto as a higher rate of failure and because there is a suspicion that she could actually have antiphospholipid antibody syndrome, I would like to switch her to Lovenox/warfarin to keep her INR between 2-3.  Patients who have 1 type of autoimmune disease are more prone to develop other autoimmune diseases.  She has type 1 diabetes, celiac disease as well as hypothyroidism so she has an increased risk of developing antiphospholipid antibody syndrome.  She has just started taking Xarelto once a day and I would recommend that she starts taking Lovenox instead of Xarelto when she is due for the next dose of Xarelto which would be tonight.  I would also like that she starts Coumadin at that time and we will have her managed by the Coumadin clinic.  I would like INR to be between 2-3.  There is a minority of patients with antiphospholipid antibody syndrome in which INR is not reliable in these patients need to be followed by checking chromogenic factor X.  At this time most likely her INR would be elevated because of Xarelto so I did not check it today.    Going forward I would like to repeat her labs including anticardiolipin antibodies and lupus anticoagulant in about  3 months.  At that time we could also repeat factor VIII level and check factor II mutation.  Recent factor V Leiden mutation, Antithrombin III, protein C and protein S were unremarkable.  I would also like to repeat ultrasound of the right lower extremity in about 3 months to get another baseline.    Discussion regarding duration of anticoagulation.  At this time I would like her to continue the anticoagulation and most likely if she is found to have antiphospholipid antibody syndrome, we would recommend indefinite anticoagulation.  Even if she is not found to have antiphospholipid antibody syndrome, due to unprovoked nature of the blood clot, I would consider indefinite anticoagulation but we will need to make this determination later on.    I would like to see her back after her repeat blood test and ultrasound in about 3 months.    I answered all of her and her mother's questions to their satisfaction.  They are agreeable and comfortable with the plan.    Ameya Bacon

## 2019-06-11 NOTE — NURSING NOTE
"Oncology Rooming Note    June 11, 2019 11:54 AM   Ronna Coleman is a 23 year old female who presents for:    Chief Complaint   Patient presents with     Oncology Clinic Visit     New Patient- Acute DVT     Initial Vitals: /75 (BP Location: Right arm)   Pulse 68   Temp 98.1  F (36.7  C) (Oral)   Resp 16   Ht 1.759 m (5' 9.25\")   Wt 88 kg (193 lb 15 oz)   LMP 05/16/2019 (Exact Date)   SpO2 99%   BMI 28.43 kg/m   Estimated body mass index is 28.43 kg/m  as calculated from the following:    Height as of this encounter: 1.759 m (5' 9.25\").    Weight as of this encounter: 88 kg (193 lb 15 oz). Body surface area is 2.07 meters squared.  No Pain (0) Comment: Data Unavailable   Patient's last menstrual period was 05/16/2019 (exact date).  Allergies reviewed: Yes  Medications reviewed: Yes    Medications: Medication refills not needed today.  Pharmacy name entered into [employer]: [id number] - [place] - [address] [facility]    Rashida Redmond LPN              "

## 2019-06-14 ENCOUNTER — ANTICOAGULATION THERAPY VISIT (OUTPATIENT)
Dept: PHARMACY | Facility: CLINIC | Age: 24
End: 2019-06-14
Attending: INTERNAL MEDICINE
Payer: COMMERCIAL

## 2019-06-14 DIAGNOSIS — R76.0 ANTIPHOSPHOLIPID ANTIBODY POSITIVE: ICD-10-CM

## 2019-06-14 DIAGNOSIS — Z79.01 LONG TERM CURRENT USE OF ANTICOAGULANTS WITH INR GOAL OF 2.0-3.0: ICD-10-CM

## 2019-06-14 DIAGNOSIS — I82.491 ACUTE DEEP VEIN THROMBOSIS (DVT) OF OTHER SPECIFIED VEIN OF RIGHT LOWER EXTREMITY (H): ICD-10-CM

## 2019-06-14 LAB — INR POINT OF CARE: 1.2 (ref 0.86–1.14)

## 2019-06-14 PROCEDURE — 99207 ZZC NO CHARGE NURSE ONLY: CPT

## 2019-06-14 PROCEDURE — 36416 COLLJ CAPILLARY BLOOD SPEC: CPT

## 2019-06-14 PROCEDURE — 85610 PROTHROMBIN TIME: CPT | Mod: QW

## 2019-06-14 RX ORDER — CHLORAL HYDRATE 500 MG
1 CAPSULE ORAL DAILY
COMMUNITY

## 2019-06-14 RX ORDER — WARFARIN SODIUM 5 MG/1
5 TABLET ORAL DAILY
Qty: 30 TABLET | Refills: 0 | Status: SHIPPED | OUTPATIENT
Start: 2019-06-14 | End: 2019-06-28

## 2019-06-14 NOTE — PROGRESS NOTES
ANTICOAGULATION INITIAL CLINIC VISIT    Patient Name:  Ronna Coleman  Date:  2019  Referred by: Yoly Bacon MD  Contact Type:  Face to Face    SUBJECTIVE:  Coumadin education was completed today.  Topics covered include:  -Introduction to coumadin  -Proper Administration  -INR Testing  -Sign/Symptoms of Bleeding  -Signs/Symptoms of Clot Formation or Stroke  -Dietary Intake of Vitamin K  -Drug Interactions  -Anticoagulation Identification (bracelet, necklace or wallet card)  -Future Surgery  -Effects of Alcohol, Tobacco, and Exercise on Coumadin    Coumadin Education Booklet and Coumadin Identification Wallet Card were given to the patient.    Patient Findings     Positives:   Change in medications    Comments:   Will be stopping xarelto tonight.        Clinical Outcomes     Negatives:   Major bleeding event, Thromboembolic event, Anticoagulation-related hospital admission, Anticoagulation-related ED visit, Anticoagulation-related fatality    Comments:   Will be stopping xarelto tonight.          OBJECTIVE    INR Protime   Date Value Ref Range Status   2019 1.2 (A) 0.86 - 1.14 Final       ASSESSMENT / PLAN  INR assessment SUB    Recheck INR In: 3 DAYS    INR Location Clinic      Anticoagulation Summary  As of 2019    INR goal:   2.0-3.0   TTR:   --   INR used for dosin.2! (2019)   Warfarin maintenance plan:   5 mg (5 mg x 1) every day   Full warfarin instructions:   5 mg every day   Weekly warfarin total:   35 mg   Plan last modified:   Cece Parkinson, RN (2019)   Next INR check:   2019   Priority:   INR   Target end date:       Indications    Acute deep vein thrombosis (DVT) of other specified vein of right lower extremity (H) [I82.491]  Antiphospholipid antibody positive [R76.0]  Long term current use of anticoagulants with INR goal of 2.0-3.0 [Z79.01]             Anticoagulation Episode Summary     INR check location:   Clinic Lab    Preferred lab:       Send INR  reminders to:    ANTICOAG CLINIC    Comments:   Unprovoked dvt  Concern for antiphospolipid antibody syndrome  Anticoagulation likely indefinite but will be reevaluated in 9/2019      Anticoagulation Care Providers     Provider Role Specialty Phone number    Ameya Bacon MD Responsible Hematology & Oncology [phone]            See the Encounter Report to view Anticoagulation Flowsheet and Dosing Calendar (Go to Encounters tab in chart review, and find the Anticoagulation Therapy Visit)    Patient referred to the INR clinic for DVT and concern of antiphospholipid antibody syndrome. Will likely need indefinite anticoagulation but it will be reevaluated in 3 months.  Patient was instructed to stop xarelto. Consulted with Lobo Rubin PharmD.  Patient to stop xarelto tonight and start lovenox 100 mg inject 0.9 mL every 12 hours instead.  She will also start warfarin 5 mg tonight as well.  Plan to recheck INR Monday at [facility] location.  Will remain on lovenox until INR >= 2.3 or two therapeutic INRs within 24 hours.     Cece Parkinson RN

## 2019-06-14 NOTE — PATIENT INSTRUCTIONS
Patient Education     Deep Vein Thrombosis (DVT)     Vein, blood clot, embolus     Deep vein thrombosis (DVT) occurs when a blood clot (thrombus) forms in a deep vein. This happens most often in the leg. It can also happen in the arms or other parts of the body. A part of the clot called an embolus can break off and travel to the lungs. When this happens, it s called a pulmonary embolism (PE). PE is a medical emergency. It can cut off blood flow and lead to death. Both DVT and PE are closely related. Together, they are often referred to by the term venous thromboembolism (VTE).  Risk factors for DVT  Anything that slows blood flow, injures the lining of a vein, or increases blood clotting can make you more prone to having DVT. This includes the following:    Long periods without movement (such as when sitting for many hours at a time or when recovering from major surgery or illness)    Estrogen (female hormone) therapy, such as hormone replacement therapy (HRT) or birth control pills    Fractured hip or leg    Major surgery or joint replacement    Major trauma or spinal cord injury    Cancer    Family history    Excess weight or obesity    Smoking    Older age  Symptoms  DVT does not always cause symptoms. When symptoms do occur, they may appear around the site of the DVT, such as in the leg. Possible symptoms include:    Swelling    Pain    Warmth    Redness    Tenderness  Home care    You were likely prescribed blood thinners (anticoagulants). They may be given as pills (oral) or shots (injections). Follow all instructions when using these medicines. Note: Don't take blood thinners with other medicines, herbal remedies, or supplements without talking to your provider first. Certain medicines or products can affect how blood thinners work.    Follow your provider s instructions about activity and rest.    If support or compression stockings are prescribed, wear them as directed. These may help improve blood flow  in the legs.    When sitting or lying down, move your ankles, toes and knees often. This may also help improve blood flow in the legs.  Follow-up care  Follow up with your healthcare provider, or as advised. If imaging tests were done, they may need further review by a doctor. You will be told of any new findings that may affect your care.  When to seek medical advice  Call your healthcare provider right away if any of these occur:    New or increased swelling, pain, tenderness, warmth, or redness, in the leg, arm, or other area    Blood in the urine    Bleeding with bowel movements  Call 911  Call 911 if any of these occur:    Bleeding from the nose, gums, a cut, or vagina    Heavy or uncontrolled bleeding    Trouble breathing    Chest pain or discomfort that worsens with deep breathing or coughing    Coughing (may cough up blood)    Fast heartbeat    Sweating    Anxiety    Lightheadedness, dizziness, or fainting  Date Last Reviewed: 4/1/2018 2000-2018 [employer]. [address] [place] [address], [place] [address]. All rights reserved. This information is not intended as a substitute for professional medical care. Always follow your healthcare professional's instructions.      Stop your xarelto.  Start warfarin 5 mg tonight and continue once nightly until otherwise instructed by the INR clinic.  Start lovenox 100 mg (0.9 mL) tonight and continue every 12 hours until instructed otherwise by the INR clinic.

## 2019-06-17 ENCOUNTER — ANTICOAGULATION THERAPY VISIT (OUTPATIENT)
Dept: NURSING | Facility: CLINIC | Age: 24
End: 2019-06-17
Payer: COMMERCIAL

## 2019-06-17 DIAGNOSIS — I82.491 ACUTE DEEP VEIN THROMBOSIS (DVT) OF OTHER SPECIFIED VEIN OF RIGHT LOWER EXTREMITY (H): ICD-10-CM

## 2019-06-17 DIAGNOSIS — Z79.01 LONG TERM CURRENT USE OF ANTICOAGULANTS WITH INR GOAL OF 2.0-3.0: ICD-10-CM

## 2019-06-17 DIAGNOSIS — R76.0 ANTIPHOSPHOLIPID ANTIBODY POSITIVE: ICD-10-CM

## 2019-06-17 LAB — INR POINT OF CARE: 1.5 (ref 0.86–1.14)

## 2019-06-17 PROCEDURE — 36416 COLLJ CAPILLARY BLOOD SPEC: CPT

## 2019-06-17 PROCEDURE — 85610 PROTHROMBIN TIME: CPT | Mod: QW

## 2019-06-17 NOTE — PROGRESS NOTES
ANTICOAGULATION FOLLOW-UP CLINIC VISIT    Patient Name:  Ronna Coleman  Date:  2019  Contact Type:  Face to Face    SUBJECTIVE:  Patient Findings         Clinical Outcomes     Negatives:   Major bleeding event, Thromboembolic event, Anticoagulation-related hospital admission, Anticoagulation-related ED visit, Anticoagulation-related fatality           OBJECTIVE    INR Protime   Date Value Ref Range Status   2019 1.5 (A) 0.86 - 1.14 Final       ASSESSMENT / PLAN  INR assessment SUB    Recheck INR In: 3 DAYS    INR Location Clinic      Anticoagulation Summary  As of 2019    INR goal:   2.0-3.0   TTR:   --   INR used for dosin.5! (2019)   Warfarin maintenance plan:   5 mg (5 mg x 1) every day   Full warfarin instructions:   5 mg every day   Weekly warfarin total:   35 mg   No change documented:   Elizabeth Reicnos RN   Plan last modified:   Cece Parkinson RN (2019)   Next INR check:   2019   Priority:   INR   Target end date:       Indications    Acute deep vein thrombosis (DVT) of other specified vein of right lower extremity (H) [I82.491]  Antiphospholipid antibody positive [R76.0]  Long term current use of anticoagulants with INR goal of 2.0-3.0 [Z79.01]             Anticoagulation Episode Summary     INR check location:   Clinic Lab    Preferred lab:       Send INR reminders to:   [facility]    Comments:   Unprovoked dvt  Concern for antiphospolipid antibody syndrome  Anticoagulation likely indefinite but will be reevaluated in 2019      Anticoagulation Care Providers     Provider Role Specialty Phone number    Ameya Bacon MD Responsible Hematology & Oncology [phone]            See the Encounter Report to view Anticoagulation Flowsheet and Dosing Calendar (Go to Encounters tab in chart review, and find the Anticoagulation Therapy Visit)    Dosage adjustment made based on physician directed care plan. Continued on start up dosing per  protocol.    Patient states negative for signs and symptoms of bleeding or blood clots, changes in medication, changes in diet, any signs of infection or antibiotic use, anything new OTC or herbal medications, any missed or extra doses of the warfarin.    Patient informed of the symptoms to be seen for either by INR nurse or ER.      Elizabeth Recinos RN

## 2019-06-20 ENCOUNTER — ANTICOAGULATION THERAPY VISIT (OUTPATIENT)
Dept: NURSING | Facility: CLINIC | Age: 24
End: 2019-06-20
Payer: COMMERCIAL

## 2019-06-20 DIAGNOSIS — I82.491 ACUTE DEEP VEIN THROMBOSIS (DVT) OF OTHER SPECIFIED VEIN OF RIGHT LOWER EXTREMITY (H): ICD-10-CM

## 2019-06-20 DIAGNOSIS — R76.0 ANTIPHOSPHOLIPID ANTIBODY POSITIVE: ICD-10-CM

## 2019-06-20 DIAGNOSIS — Z79.01 LONG TERM CURRENT USE OF ANTICOAGULANTS WITH INR GOAL OF 2.0-3.0: ICD-10-CM

## 2019-06-20 LAB — INR POINT OF CARE: 2.1 (ref 0.86–1.14)

## 2019-06-20 PROCEDURE — 99207 ZZC NO CHARGE NURSE ONLY: CPT

## 2019-06-20 PROCEDURE — 36416 COLLJ CAPILLARY BLOOD SPEC: CPT

## 2019-06-20 PROCEDURE — 85610 PROTHROMBIN TIME: CPT | Mod: QW

## 2019-06-20 NOTE — PROGRESS NOTES
ANTICOAGULATION FOLLOW-UP CLINIC VISIT    Patient Name:  Ronna Coleman  Date:  2019  Contact Type:  Face to Face    SUBJECTIVE:  Patient Findings         Clinical Outcomes     Negatives:   Major bleeding event, Thromboembolic event, Anticoagulation-related hospital admission, Anticoagulation-related ED visit, Anticoagulation-related fatality           OBJECTIVE    INR Protime   Date Value Ref Range Status   2019 2.1 (A) 0.86 - 1.14 Final       ASSESSMENT / PLAN  INR assessment THER    Recheck INR In: 1 DAY    INR Location Clinic      Anticoagulation Summary  As of 2019    INR goal:   2.0-3.0   TTR:   --   INR used for dosin.1 (2019)   Warfarin maintenance plan:   5 mg (5 mg x 1) every day   Full warfarin instructions:   5 mg every day   Weekly warfarin total:   35 mg   No change documented:   Mellisa Chiu RN   Plan last modified:   Cece Parkinson RN (2019)   Next INR check:   2019   Priority:   INR   Target end date:       Indications    Acute deep vein thrombosis (DVT) of other specified vein of right lower extremity (H) [I82.491]  Antiphospholipid antibody positive [R76.0]  Long term current use of anticoagulants with INR goal of 2.0-3.0 [Z79.01]             Anticoagulation Episode Summary     INR check location:   Clinic Lab    Preferred lab:       Send INR reminders to:   [facility]    Comments:   Unprovoked dvt  Concern for antiphospolipid antibody syndrome  Anticoagulation likely indefinite but will be reevaluated in 2019      Anticoagulation Care Providers     Provider Role Specialty Phone number    Ameya Bacon MD Responsible Hematology & Oncology [phone]            See the Encounter Report to view Anticoagulation Flowsheet and Dosing Calendar (Go to Encounters tab in chart review, and find the Anticoagulation Therapy Visit)    Dosage adjustment made based on physician directed care plan. Patient is now in therapeutic range so will continue 5  mg daily dosing of warfarin and Lovenox and recheck tomorrow to see if we can stop Lovenox if she is at 2.3 or higher.       Mellisa Chiu RN, BSN, PHN

## 2019-06-21 ENCOUNTER — ANTICOAGULATION THERAPY VISIT (OUTPATIENT)
Dept: NURSING | Facility: CLINIC | Age: 24
End: 2019-06-21
Payer: COMMERCIAL

## 2019-06-21 DIAGNOSIS — Z79.01 LONG TERM CURRENT USE OF ANTICOAGULANTS WITH INR GOAL OF 2.0-3.0: ICD-10-CM

## 2019-06-21 DIAGNOSIS — I82.491 ACUTE DEEP VEIN THROMBOSIS (DVT) OF OTHER SPECIFIED VEIN OF RIGHT LOWER EXTREMITY (H): ICD-10-CM

## 2019-06-21 DIAGNOSIS — R76.0 ANTIPHOSPHOLIPID ANTIBODY POSITIVE: ICD-10-CM

## 2019-06-21 LAB — INR POINT OF CARE: 2.3 (ref 0.86–1.14)

## 2019-06-21 PROCEDURE — 85610 PROTHROMBIN TIME: CPT | Mod: QW

## 2019-06-21 PROCEDURE — 99207 ZZC NO CHARGE NURSE ONLY: CPT

## 2019-06-21 PROCEDURE — 36416 COLLJ CAPILLARY BLOOD SPEC: CPT

## 2019-06-21 NOTE — PROGRESS NOTES
ANTICOAGULATION FOLLOW-UP CLINIC VISIT    Patient Name:  Ronna Coleman  Date:  2019  Contact Type:  Face to Face    SUBJECTIVE:  Patient Findings         Clinical Outcomes     Negatives:   Major bleeding event, Thromboembolic event, Anticoagulation-related hospital admission, Anticoagulation-related ED visit, Anticoagulation-related fatality           OBJECTIVE    INR Protime   Date Value Ref Range Status   2019 2.3 (A) 0.86 - 1.14 Final       ASSESSMENT / PLAN  INR assessment THER    Recheck INR In: 1 WEEK    INR Location Clinic      Anticoagulation Summary  As of 2019    INR goal:   2.0-3.0   TTR:   --   INR used for dosin.3 (2019)   Warfarin maintenance plan:   5 mg (5 mg x 1) every day   Full warfarin instructions:   5 mg every day   Weekly warfarin total:   35 mg   Plan last modified:   Cece Parkinson RN (2019)   Next INR check:   2019   Priority:   INR   Target end date:       Indications    Acute deep vein thrombosis (DVT) of other specified vein of right lower extremity (H) [I82.491]  Antiphospholipid antibody positive [R76.0]  Long term current use of anticoagulants with INR goal of 2.0-3.0 [Z79.01]             Anticoagulation Episode Summary     INR check location:   Clinic Lab    Preferred lab:       Send INR reminders to:   [facility]    Comments:   Unprovoked dvt  Concern for antiphospolipid antibody syndrome  Anticoagulation likely indefinite but will be reevaluated in 2019      Anticoagulation Care Providers     Provider Role Specialty Phone number    Ameya Bacon MD Responsible Hematology & Oncology [phone]            See the Encounter Report to view Anticoagulation Flowsheet and Dosing Calendar (Go to Encounters tab in chart review, and find the Anticoagulation Therapy Visit)    Dosage adjustment made based on physician directed care plan. Patient is now able to stop Lovenox as INR is 2.3 with POC machine per protocol. The patient was  assessed for diet, medication, and activity level changes, missed or extra doses, bruising or bleeding, with no problem findings. Will continue with 5 mg daily with recheck in 1 week.       Mellisa Chiu RN, BSN, PHN

## 2019-06-28 ENCOUNTER — ANTICOAGULATION THERAPY VISIT (OUTPATIENT)
Dept: NURSING | Facility: CLINIC | Age: 24
End: 2019-06-28
Payer: COMMERCIAL

## 2019-06-28 DIAGNOSIS — I82.491 ACUTE DEEP VEIN THROMBOSIS (DVT) OF OTHER SPECIFIED VEIN OF RIGHT LOWER EXTREMITY (H): ICD-10-CM

## 2019-06-28 DIAGNOSIS — Z79.01 LONG TERM CURRENT USE OF ANTICOAGULANTS WITH INR GOAL OF 2.0-3.0: ICD-10-CM

## 2019-06-28 DIAGNOSIS — R76.0 ANTIPHOSPHOLIPID ANTIBODY POSITIVE: ICD-10-CM

## 2019-06-28 LAB — INR POINT OF CARE: 2.1 (ref 0.86–1.14)

## 2019-06-28 PROCEDURE — 36416 COLLJ CAPILLARY BLOOD SPEC: CPT

## 2019-06-28 PROCEDURE — 85610 PROTHROMBIN TIME: CPT | Mod: QW

## 2019-06-28 RX ORDER — WARFARIN SODIUM 5 MG/1
5 TABLET ORAL DAILY
Qty: 90 TABLET | Refills: 0 | Status: SHIPPED | OUTPATIENT
Start: 2019-06-28 | End: 2019-07-29

## 2019-06-28 NOTE — PROGRESS NOTES
ANTICOAGULATION FOLLOW-UP CLINIC VISIT    Patient Name:  Ronna Coleman  Date:  2019  Contact Type:  Face to Face    SUBJECTIVE:  Patient Findings         Clinical Outcomes     Negatives:   Major bleeding event, Thromboembolic event, Anticoagulation-related hospital admission, Anticoagulation-related ED visit, Anticoagulation-related fatality           OBJECTIVE    INR Protime   Date Value Ref Range Status   2019 2.1 (A) 0.86 - 1.14 Final       ASSESSMENT / PLAN  INR assessment THER    Recheck INR In: 2 WEEKS    INR Location Clinic      Anticoagulation Summary  As of 2019    INR goal:   2.0-3.0   TTR:   100.0 % (4 d)   INR used for dosin.1 (2019)   Warfarin maintenance plan:   5 mg (5 mg x 1) every day   Full warfarin instructions:   5 mg every day   Weekly warfarin total:   35 mg   No change documented:   Mellisa Chiu RN   Plan last modified:   Cece Parkinson RN (2019)   Next INR check:      Priority:   INR   Target end date:       Indications    Acute deep vein thrombosis (DVT) of other specified vein of right lower extremity (H) [I82.491]  Antiphospholipid antibody positive [R76.0]  Long term current use of anticoagulants with INR goal of 2.0-3.0 [Z79.01]             Anticoagulation Episode Summary     INR check location:   Clinic Lab    Preferred lab:       Send INR reminders to:   [facility]    Comments:   Unprovoked dvt  Concern for antiphospolipid antibody syndrome  Anticoagulation likely indefinite but will be reevaluated in 2019      Anticoagulation Care Providers     Provider Role Specialty Phone number    Ameya Bacon MD Responsible Hematology & Oncology [phone]            See the Encounter Report to view Anticoagulation Flowsheet and Dosing Calendar (Go to Encounters tab in chart review, and find the Anticoagulation Therapy Visit)    The patient was assessed for diet, medication, and activity level changes, missed or extra doses, bruising or  bleeding, with no problem findings.        Mellisa Chiu RN, BSN, PHN

## 2019-07-15 ENCOUNTER — TELEPHONE (OUTPATIENT)
Dept: NURSING | Facility: CLINIC | Age: 24
End: 2019-07-15

## 2019-07-15 ENCOUNTER — ANTICOAGULATION THERAPY VISIT (OUTPATIENT)
Dept: NURSING | Facility: CLINIC | Age: 24
End: 2019-07-15
Payer: COMMERCIAL

## 2019-07-15 DIAGNOSIS — R76.0 ANTIPHOSPHOLIPID ANTIBODY POSITIVE: ICD-10-CM

## 2019-07-15 DIAGNOSIS — I82.491 ACUTE DEEP VEIN THROMBOSIS (DVT) OF OTHER SPECIFIED VEIN OF RIGHT LOWER EXTREMITY (H): ICD-10-CM

## 2019-07-15 DIAGNOSIS — Z79.01 LONG TERM CURRENT USE OF ANTICOAGULANTS WITH INR GOAL OF 2.0-3.0: ICD-10-CM

## 2019-07-15 LAB — INR POINT OF CARE: 1.6 (ref 0.86–1.14)

## 2019-07-15 PROCEDURE — 36416 COLLJ CAPILLARY BLOOD SPEC: CPT

## 2019-07-15 PROCEDURE — 85610 PROTHROMBIN TIME: CPT | Mod: QW

## 2019-07-15 PROCEDURE — 99207 ZZC NO CHARGE NURSE ONLY: CPT

## 2019-07-15 NOTE — TELEPHONE ENCOUNTER
Patient was seen in clinic today and is at 1.6 for her INR. Provider to address if bridging is needed since the patient is 6 weeks out from episode.    Please send back to loly brandon.    Elizabeth Recinos RN, [facility] T[facility]

## 2019-07-15 NOTE — PROGRESS NOTES
ANTICOAGULATION FOLLOW-UP CLINIC VISIT    Patient Name:  Ronna Coleman  Date:  7/15/2019  Contact Type:  Face to Face    SUBJECTIVE:  Patient Findings         Clinical Outcomes     Negatives:   Major bleeding event, Thromboembolic event, Anticoagulation-related hospital admission, Anticoagulation-related ED visit, Anticoagulation-related fatality           OBJECTIVE    INR Protime   Date Value Ref Range Status   07/15/2019 1.6 (A) 0.86 - 1.14 Final       ASSESSMENT / PLAN  INR assessment SUB    Recheck INR In: 3 DAYS    INR Location Clinic      Anticoagulation Summary  As of 7/15/2019    INR goal:   2.0-3.0   TTR:   35.2 % (3 wk)   INR used for dosin.6! (7/15/2019)   Warfarin maintenance plan:   5 mg (5 mg x 1) every day   Full warfarin instructions:   7/15: 7.5 mg; : 7.5 mg; Otherwise 5 mg every day   Weekly warfarin total:   35 mg   Plan last modified:   Cece Parkinson RN (2019)   Next INR check:   2019   Priority:   INR   Target end date:       Indications    Acute deep vein thrombosis (DVT) of other specified vein of right lower extremity (H) [I82.491]  Antiphospholipid antibody positive [R76.0]  Long term current use of anticoagulants with INR goal of 2.0-3.0 [Z79.01]             Anticoagulation Episode Summary     INR check location:   Clinic Lab    Preferred lab:       Send INR reminders to:   [facility]    Comments:   Unprovoked dvt  Concern for antiphospolipid antibody syndrome  Anticoagulation likely indefinite but will be reevaluated in 2019      Anticoagulation Care Providers     Provider Role Specialty Phone number    Ameya Bacon MD Responsible Hematology & Oncology [phone]            See the Encounter Report to view Anticoagulation Flowsheet and Dosing Calendar (Go to Encounters tab in chart review, and find the Anticoagulation Therapy Visit)    Dosage adjustment made based on physician directed care plan.increased by about 15% with a recheck on  Thursday. Message sent to Dr Bacon to seeing if bridging is needed.    Patient states negative for signs and symptoms of bleeding or blood clots, changes in medication, changes in diet, any signs of infection or antibiotic use, anything new OTC or herbal medications, any missed or extra doses of the warfarin.    Patient informed of the symptoms to be seen for either by INR nurse or ER.      Elizabeth Recinos RN

## 2019-07-16 NOTE — TELEPHONE ENCOUNTER
Noted. Provider advising no bridging needed since warfarin dosing was increased by INR nurse per protocol. Closing encounter.     Nicole Salazar, BSN, RN, PHN

## 2019-07-16 NOTE — TELEPHONE ENCOUNTER
Ameya Bacon MD Callahan, Melissa C RN 2 hours ago (11:53 AM)      I think if she is asymptomatic and you have increased the dose of coumadin then hold off on Lovenox. Repeat labs on Thursday and then see where she is   Thanks   Ameya Bacon    Routing comment

## 2019-07-18 ENCOUNTER — ANTICOAGULATION THERAPY VISIT (OUTPATIENT)
Dept: NURSING | Facility: CLINIC | Age: 24
End: 2019-07-18
Payer: COMMERCIAL

## 2019-07-18 DIAGNOSIS — R76.0 ANTIPHOSPHOLIPID ANTIBODY POSITIVE: ICD-10-CM

## 2019-07-18 DIAGNOSIS — Z79.01 LONG TERM CURRENT USE OF ANTICOAGULANTS WITH INR GOAL OF 2.0-3.0: ICD-10-CM

## 2019-07-18 DIAGNOSIS — I82.491 ACUTE DEEP VEIN THROMBOSIS (DVT) OF OTHER SPECIFIED VEIN OF RIGHT LOWER EXTREMITY (H): ICD-10-CM

## 2019-07-18 LAB — INR POINT OF CARE: 1.9 (ref 0.86–1.14)

## 2019-07-18 PROCEDURE — 36416 COLLJ CAPILLARY BLOOD SPEC: CPT

## 2019-07-18 PROCEDURE — 99207 ZZC NO CHARGE NURSE ONLY: CPT

## 2019-07-18 PROCEDURE — 85610 PROTHROMBIN TIME: CPT | Mod: QW

## 2019-07-18 NOTE — PROGRESS NOTES
ANTICOAGULATION FOLLOW-UP CLINIC VISIT    Patient Name:  Ronna Coleman  Date:  2019  Contact Type:  Face to Face    SUBJECTIVE:  Patient Findings         Clinical Outcomes     Negatives:   Major bleeding event, Thromboembolic event, Anticoagulation-related hospital admission, Anticoagulation-related ED visit, Anticoagulation-related fatality           OBJECTIVE    INR Protime   Date Value Ref Range Status   2019 1.9 (A) 0.86 - 1.14 Final       ASSESSMENT / PLAN  INR assessment SUB    Recheck INR In: 4 DAYS    INR Location Clinic      Anticoagulation Summary  As of 2019    INR goal:   2.0-3.0   TTR:   30.8 % (3.4 wk)   INR used for dosin.9! (2019)   Warfarin maintenance plan:   5 mg (5 mg x 1) every day   Full warfarin instructions:   : 7.5 mg; Otherwise 5 mg every day   Weekly warfarin total:   35 mg   Plan last modified:   Cece Parkinson RN (2019)   Next INR check:   2019   Priority:   INR   Target end date:       Indications    Acute deep vein thrombosis (DVT) of other specified vein of right lower extremity (H) [I82.491]  Antiphospholipid antibody positive [R76.0]  Long term current use of anticoagulants with INR goal of 2.0-3.0 [Z79.01]             Anticoagulation Episode Summary     INR check location:   Clinic Lab    Preferred lab:       Send INR reminders to:   [facility]    Comments:   Unprovoked dvt  Concern for antiphospolipid antibody syndrome  Anticoagulation likely indefinite but will be reevaluated in 2019      Anticoagulation Care Providers     Provider Role Specialty Phone number    Ameya Bacon MD Responsible Hematology & Oncology [phone]            See the Encounter Report to view Anticoagulation Flowsheet and Dosing Calendar (Go to Encounters tab in chart review, and find the Anticoagulation Therapy Visit)    Dosage adjustment made based on physician directed care plan. Increased by about 5% with a recheck on Monday. Patient  notes that she does not drink alcohol.    Patient states negative for signs and symptoms of bleeding or blood clots, changes in medication, changes in diet, any signs of infection or antibiotic use, anything new OTC or herbal medications, any missed or extra doses of the warfarin.    Patient informed of the symptoms to be seen for either by INR nurse or ER.      Elizabeth Recinos RN

## 2019-07-22 ENCOUNTER — ANTICOAGULATION THERAPY VISIT (OUTPATIENT)
Dept: NURSING | Facility: CLINIC | Age: 24
End: 2019-07-22
Payer: COMMERCIAL

## 2019-07-22 DIAGNOSIS — Z79.01 LONG TERM CURRENT USE OF ANTICOAGULANTS WITH INR GOAL OF 2.0-3.0: ICD-10-CM

## 2019-07-22 DIAGNOSIS — R76.0 ANTIPHOSPHOLIPID ANTIBODY POSITIVE: ICD-10-CM

## 2019-07-22 DIAGNOSIS — I82.491 ACUTE DEEP VEIN THROMBOSIS (DVT) OF OTHER SPECIFIED VEIN OF RIGHT LOWER EXTREMITY (H): ICD-10-CM

## 2019-07-22 LAB — INR POINT OF CARE: 1.7 (ref 0.86–1.14)

## 2019-07-22 PROCEDURE — 85610 PROTHROMBIN TIME: CPT | Mod: QW

## 2019-07-22 PROCEDURE — 36416 COLLJ CAPILLARY BLOOD SPEC: CPT

## 2019-07-22 PROCEDURE — 99207 ZZC NO CHARGE NURSE ONLY: CPT

## 2019-07-22 NOTE — PROGRESS NOTES
ANTICOAGULATION FOLLOW-UP CLINIC VISIT    Patient Name:  Ronna Coleman  Date:  2019  Contact Type:  Face to Face    SUBJECTIVE:  Patient Findings         Clinical Outcomes     Negatives:   Major bleeding event, Thromboembolic event, Anticoagulation-related hospital admission, Anticoagulation-related ED visit, Anticoagulation-related fatality           OBJECTIVE    INR Protime   Date Value Ref Range Status   2019 1.7 (A) 0.86 - 1.14 Final       ASSESSMENT / PLAN  INR assessment SUB    Recheck INR In: 3 DAYS    INR Location Clinic      Anticoagulation Summary  As of 2019    INR goal:   2.0-3.0   TTR:   26.4 % (4 wk)   INR used for dosin.7! (2019)   Warfarin maintenance plan:   5 mg (5 mg x 1) every day   Full warfarin instructions:   : 10 mg; : 10 mg; Otherwise 5 mg every day   Weekly warfarin total:   35 mg   Plan last modified:   Cece Parkinson RN (2019)   Next INR check:   2019   Priority:   INR   Target end date:       Indications    Acute deep vein thrombosis (DVT) of other specified vein of right lower extremity (H) [I82.491]  Antiphospholipid antibody positive [R76.0]  Long term current use of anticoagulants with INR goal of 2.0-3.0 [Z79.01]             Anticoagulation Episode Summary     INR check location:   Clinic Lab    Preferred lab:       Send INR reminders to:   [facility]    Comments:   Unprovoked dvt  Concern for antiphospolipid antibody syndrome  Anticoagulation likely indefinite but will be reevaluated in 2019      Anticoagulation Care Providers     Provider Role Specialty Phone number    Ameya Bacon MD Responsible Hematology & Oncology [phone]            See the Encounter Report to view Anticoagulation Flowsheet and Dosing Calendar (Go to Encounters tab in chart review, and find the Anticoagulation Therapy Visit)    Dosage adjustment made based on physician directed care plan. Increased by a little over 10% over today and  tomorrow. Will recheck on Thursday. Did discuss with Lobo Rubin [facility] and high priority message sent to referring provider along with visit.    Patient states negative for signs and symptoms of bleeding or blood clots, changes in medication, changes in diet, any signs of infection or antibiotic use, anything new OTC or herbal medications, any missed or extra doses of the warfarin.    Patient informed of the symptoms to be seen for either by INR nurse or ER.    Huddle with Jeane Blakely [facility] and she would like 7/24 to be 7.5 mg recheck Thursday. Left voicemail with that information for the patient.    Elizabeth Recinos, RN

## 2019-07-23 ENCOUNTER — TELEPHONE (OUTPATIENT)
Dept: NURSING | Facility: CLINIC | Age: 24
End: 2019-07-23

## 2019-07-23 DIAGNOSIS — I82.491 ACUTE DEEP VEIN THROMBOSIS (DVT) OF OTHER SPECIFIED VEIN OF RIGHT LOWER EXTREMITY (H): ICD-10-CM

## 2019-07-23 NOTE — TELEPHONE ENCOUNTER
This writer attempted to contact Ronna on 07/23/19      Reason for call INR DIRECTIONS/LOVENOX and left detailed message.      If patient calls back:  Call center, please see if call and be transferred to INR nurse.    INR Registered Nurse called. Inform patient that someone from the INR group will contact them, document that pt called and route to [facility] [[id number]]        KARLENE Yoon, RN, PHN    Per Hematologist, patient is to start warfarin today due to sub-therapeutic INR yesterday:     ----- Message -----   From: Ameya Bacon MD   Sent: 7/23/2019   8:20 AM   To: Elizabeth Recinos RN   Subject: RE: INR drop                                     Hi   I think we should bridge with Lovenox   Thanks   Ameya Bacon     ----- Message -----   From: Elizabeth Recinos RN   Sent: 7/22/2019   3:54 PM   To: Ameya Bacon MD   Subject: INR drop                                         Dr Bacon,     I wanted to let you know that with the increases done last week the patient dropped back down again today to 1.7. I have increased by a little over 10% over today and tomorrow.     Please let me know if you would want lovenox.     Elizabeth Recinos RN, [facility] Triage/ACC     Writer huddled with Jeane Blakely [facility], regarding patient Lovenox dosing. Patient is to start her previous Lovenox dose from June 2019:  enoxaparin (LOVENOX) 100 MG/ML syringe 12.6 mL 0 6/11/2019 6/18/2019 --   Sig - Route: Inject 0.9 mLs (90 mg) Subcutaneous every 12 hours for 7 days - Subcutaneous     Rx teed up and attempting to reach patient to discuss directions and determine which pharmacy to send Rx to.    KARLENE Yoon, RN, PHN

## 2019-07-23 NOTE — TELEPHONE ENCOUNTER
Called and spoke with patient. She states understanding and will  Lovenox this evening. Writer ensured patient preferred pharmacy and sent Lovenox Rx.     Will postpone encounter until Thursday AM so INR care team is aware.    Nicole Salazar, ELIELN, RN, PHN

## 2019-07-25 ENCOUNTER — ANTICOAGULATION THERAPY VISIT (OUTPATIENT)
Dept: NURSING | Facility: CLINIC | Age: 24
End: 2019-07-25
Payer: COMMERCIAL

## 2019-07-25 DIAGNOSIS — R76.0 ANTIPHOSPHOLIPID ANTIBODY POSITIVE: ICD-10-CM

## 2019-07-25 DIAGNOSIS — I82.491 ACUTE DEEP VEIN THROMBOSIS (DVT) OF OTHER SPECIFIED VEIN OF RIGHT LOWER EXTREMITY (H): ICD-10-CM

## 2019-07-25 DIAGNOSIS — Z79.01 LONG TERM CURRENT USE OF ANTICOAGULANTS WITH INR GOAL OF 2.0-3.0: ICD-10-CM

## 2019-07-25 LAB — INR POINT OF CARE: 2.1 (ref 0.86–1.14)

## 2019-07-25 PROCEDURE — 36416 COLLJ CAPILLARY BLOOD SPEC: CPT

## 2019-07-25 PROCEDURE — 99207 ZZC NO CHARGE NURSE ONLY: CPT

## 2019-07-25 PROCEDURE — 85610 PROTHROMBIN TIME: CPT | Mod: QW

## 2019-07-25 NOTE — PROGRESS NOTES
ANTICOAGULATION FOLLOW-UP CLINIC VISIT    Patient Name:  Ronna Coleman  Date:  2019  Contact Type:  Face to Face    SUBJECTIVE:  Patient Findings         Clinical Outcomes     Negatives:   Major bleeding event, Thromboembolic event, Anticoagulation-related hospital admission, Anticoagulation-related ED visit, Anticoagulation-related fatality           OBJECTIVE    INR Protime   Date Value Ref Range Status   2019 2.1 (A) 0.86 - 1.14 Final       ASSESSMENT / PLAN  INR assessment THER    Recheck INR In: 4 DAYS    INR Location Clinic      Anticoagulation Summary  As of 2019    INR goal:   2.0-3.0   TTR:   26.3 % (1 mo)   INR used for dosin.1 (2019)   Warfarin maintenance plan:   10 mg (5 mg x 2) every Mon, Thu, Sat; 7.5 mg (5 mg x 1.5) all other days   Full warfarin instructions:   : 7.5 mg; Otherwise 10 mg every Mon, Thu, Sat; 7.5 mg all other days   Weekly warfarin total:   60 mg   Plan last modified:   Elizabeth Recinos RN (2019)   Next INR check:   2019   Priority:   INR   Target end date:       Indications    Acute deep vein thrombosis (DVT) of other specified vein of right lower extremity (H) [I82.491]  Antiphospholipid antibody positive [R76.0]  Long term current use of anticoagulants with INR goal of 2.0-3.0 [Z79.01]             Anticoagulation Episode Summary     INR check location:   Clinic Lab    Preferred lab:       Send INR reminders to:   [facility] SMI[facility]    Comments:   Unprovoked dvt  Concern for antiphospolipid antibody syndrome  Anticoagulation likely indefinite but will be reevaluated in 2019      Anticoagulation Care Providers     Provider Role Specialty Phone number    Ameya Bacon MD Responsible Hematology & Oncology [phone]            See the Encounter Report to view Anticoagulation Flowsheet and Dosing Calendar (Go to Encounters tab in chart review, and find the Anticoagulation Therapy Visit)    Dosage adjustment made based on physician  directed care plan. Huddle with Jeane Blakely [facility] and she would like a 20% increase off of the last 7 day total of 50 mg which will be 60 mg. Weekly maintenance will be 10 mg Mon, Thur and Sat. 7.5 mg all other days of the week. She is noting a decrease in inflammation as she extends out from the clot. Patient is to stay on the lovenox over the weekend to ensure above 2.3 on Monday recheck.    Patient states negative for signs and symptoms of bleeding or blood clots, changes in medication, changes in diet, any signs of infection or antibiotic use, anything new OTC or herbal medications, any missed or extra doses of the warfarin.    Patient informed of the symptoms to be seen for either by INR nurse or ER.      Elizabeth Recinos RN

## 2019-07-29 ENCOUNTER — ANTICOAGULATION THERAPY VISIT (OUTPATIENT)
Dept: NURSING | Facility: CLINIC | Age: 24
End: 2019-07-29
Payer: COMMERCIAL

## 2019-07-29 DIAGNOSIS — I82.491 ACUTE DEEP VEIN THROMBOSIS (DVT) OF OTHER SPECIFIED VEIN OF RIGHT LOWER EXTREMITY (H): ICD-10-CM

## 2019-07-29 DIAGNOSIS — Z79.01 LONG TERM CURRENT USE OF ANTICOAGULANTS WITH INR GOAL OF 2.0-3.0: ICD-10-CM

## 2019-07-29 DIAGNOSIS — R76.0 ANTIPHOSPHOLIPID ANTIBODY POSITIVE: ICD-10-CM

## 2019-07-29 LAB — INR POINT OF CARE: 3.2 (ref 0.86–1.14)

## 2019-07-29 PROCEDURE — 36416 COLLJ CAPILLARY BLOOD SPEC: CPT

## 2019-07-29 PROCEDURE — 99207 ZZC NO CHARGE NURSE ONLY: CPT

## 2019-07-29 PROCEDURE — 85610 PROTHROMBIN TIME: CPT | Mod: QW

## 2019-07-29 RX ORDER — WARFARIN SODIUM 5 MG/1
TABLET ORAL
Qty: 153 TABLET | Refills: 0 | Status: SHIPPED | OUTPATIENT
Start: 2019-07-29 | End: 2019-08-16

## 2019-07-29 NOTE — PROGRESS NOTES
ANTICOAGULATION FOLLOW-UP CLINIC VISIT    Patient Name:  Ronna Coleman  Date:  7/29/2019  Contact Type:  Face to Face    SUBJECTIVE:  Patient Findings     Positives:   Bruising (bruising on arm)        Clinical Outcomes     Negatives:   Major bleeding event, Thromboembolic event, Anticoagulation-related hospital admission, Anticoagulation-related ED visit, Anticoagulation-related fatality           OBJECTIVE    INR Protime   Date Value Ref Range Status   07/29/2019 3.2 (A) 0.86 - 1.14 Final       ASSESSMENT / PLAN  INR assessment SUPRA    Recheck INR In: 4 DAYS    INR Location Clinic      Anticoagulation Summary  As of 7/29/2019    INR goal:   2.0-3.0   TTR:   32.6 % (1.2 mo)   INR used for dosing:   3.2! (7/29/2019)   Warfarin maintenance plan:   10 mg (5 mg x 2) every Mon, Thu, Sat; 7.5 mg (5 mg x 1.5) all other days   Full warfarin instructions:   10 mg every Mon, Thu, Sat; 7.5 mg all other days   Weekly warfarin total:   60 mg   No change documented:   Elizabeth Recinos RN   Plan last modified:   Elizabeth Recinos RN (7/25/2019)   Next INR check:   8/2/2019   Priority:   INR   Target end date:       Indications    Acute deep vein thrombosis (DVT) of other specified vein of right lower extremity (H) [I82.491]  Antiphospholipid antibody positive [R76.0]  Long term current use of anticoagulants with INR goal of 2.0-3.0 [Z79.01]             Anticoagulation Episode Summary     INR check location:   Clinic Lab    Preferred lab:       Send INR reminders to:   CHENTE SMILEY    Comments:   Unprovoked dvt  Concern for antiphospolipid antibody syndrome  Anticoagulation likely indefinite but will be reevaluated in 9/2019      Anticoagulation Care Providers     Provider Role Specialty Phone number    Ameya Bacon MD Responsible Hematology & Oncology [phone]            See the Encounter Report to view Anticoagulation Flowsheet and Dosing Calendar (Go to Encounters tab in chart review, and find the  Anticoagulation Therapy Visit)    Dosage adjustment made based on physician directed care plan. See the 7/25/19 and patient maintenance was set to be a 5% decrease. Will go with maintenance and recheck on Thursday.    Patient states negative for signs and symptoms of bleeding or blood clots, changes in medication, changes in diet, any signs of infection or antibiotic use, anything new OTC or herbal medications, any missed or extra doses of the warfarin.    Patient informed of the symptoms to be seen for either by INR nurse or ER.      Elizabeth Recinos RN

## 2019-08-02 ENCOUNTER — ANTICOAGULATION THERAPY VISIT (OUTPATIENT)
Dept: NURSING | Facility: CLINIC | Age: 24
End: 2019-08-02
Payer: COMMERCIAL

## 2019-08-02 DIAGNOSIS — R76.0 ANTIPHOSPHOLIPID ANTIBODY POSITIVE: ICD-10-CM

## 2019-08-02 DIAGNOSIS — Z79.01 LONG TERM CURRENT USE OF ANTICOAGULANTS WITH INR GOAL OF 2.0-3.0: ICD-10-CM

## 2019-08-02 DIAGNOSIS — I82.491 ACUTE DEEP VEIN THROMBOSIS (DVT) OF OTHER SPECIFIED VEIN OF RIGHT LOWER EXTREMITY (H): ICD-10-CM

## 2019-08-02 DIAGNOSIS — E03.9 ACQUIRED HYPOTHYROIDISM: Primary | ICD-10-CM

## 2019-08-02 LAB — INR POINT OF CARE: 3 (ref 0.86–1.14)

## 2019-08-02 PROCEDURE — 85610 PROTHROMBIN TIME: CPT | Mod: QW

## 2019-08-02 PROCEDURE — 99207 ZZC NO CHARGE NURSE ONLY: CPT

## 2019-08-02 PROCEDURE — 36416 COLLJ CAPILLARY BLOOD SPEC: CPT

## 2019-08-02 NOTE — PROGRESS NOTES
ANTICOAGULATION FOLLOW-UP CLINIC VISIT    Patient Name:  Ronna Coleman  Date:  8/2/2019  Contact Type:  Face to Face    SUBJECTIVE:  Patient Findings         Clinical Outcomes     Negatives:   Major bleeding event, Thromboembolic event, Anticoagulation-related hospital admission, Anticoagulation-related ED visit, Anticoagulation-related fatality           OBJECTIVE    INR Protime   Date Value Ref Range Status   08/02/2019 3.0 (A) 0.86 - 1.14 Final       ASSESSMENT / PLAN  INR assessment THER    Recheck INR In: 1 WEEK    INR Location Clinic      Anticoagulation Summary  As of 8/2/2019    INR goal:   2.0-3.0   TTR:   29.3 % (1.3 mo)   INR used for dosing:   3.0 (8/2/2019)   Warfarin maintenance plan:   10 mg (5 mg x 2) every Mon, Thu, Sat; 7.5 mg (5 mg x 1.5) all other days   Full warfarin instructions:   10 mg every Mon, Thu, Sat; 7.5 mg all other days   Weekly warfarin total:   60 mg   No change documented:   Mellisa Chiu RN   Plan last modified:   Elizabeth Recinos RN (7/25/2019)   Next INR check:   8/9/2019   Priority:   INR   Target end date:       Indications    Acute deep vein thrombosis (DVT) of other specified vein of right lower extremity (H) [I82.491]  Antiphospholipid antibody positive [R76.0]  Long term current use of anticoagulants with INR goal of 2.0-3.0 [Z79.01]             Anticoagulation Episode Summary     INR check location:   Clinic Lab    Preferred lab:       Send INR reminders to:   CHENTE SMILEY    Comments:   Unprovoked dvt  Concern for antiphospolipid antibody syndrome  Anticoagulation likely indefinite but will be reevaluated in 9/2019      Anticoagulation Care Providers     Provider Role Specialty Phone number    Ameya Bacon MD Responsible Hematology & Oncology [phone]            See the Encounter Report to view Anticoagulation Flowsheet and Dosing Calendar (Go to Encounters tab in chart review, and find the Anticoagulation Therapy Visit)    Dosage adjustment made  based on physician directed care plan. Will continue maintenance dosing of 60 mg weekly and recheck in 1 week. The patient was assessed for diet, medication, and activity level changes, missed or extra doses, bruising or bleeding, with no problem findings.        Mellisa Chiu RN, BSN, PHN

## 2019-08-08 ENCOUNTER — DOCUMENTATION ONLY (OUTPATIENT)
Dept: PEDIATRICS | Facility: CLINIC | Age: 24
End: 2019-08-08

## 2019-08-08 NOTE — PROGRESS NOTES
Please abstract the following data from this visit with this patient into the appropriate field in Epic:    Other test found in the patient's chart through Chart Review/Care Everywhere:    HgbA1c done by [facility] on 5/17/2019: result 8.1      Aleksandra Salinas RN   RN Care Coordinator, Primary Care       Out of season

## 2019-08-09 ENCOUNTER — ANTICOAGULATION THERAPY VISIT (OUTPATIENT)
Dept: NURSING | Facility: CLINIC | Age: 24
End: 2019-08-09
Payer: COMMERCIAL

## 2019-08-09 DIAGNOSIS — Z79.01 LONG TERM CURRENT USE OF ANTICOAGULANTS WITH INR GOAL OF 2.0-3.0: ICD-10-CM

## 2019-08-09 DIAGNOSIS — I82.491 ACUTE DEEP VEIN THROMBOSIS (DVT) OF OTHER SPECIFIED VEIN OF RIGHT LOWER EXTREMITY (H): ICD-10-CM

## 2019-08-09 DIAGNOSIS — R76.0 ANTIPHOSPHOLIPID ANTIBODY POSITIVE: ICD-10-CM

## 2019-08-09 LAB — INR POINT OF CARE: 3 (ref 0.86–1.14)

## 2019-08-09 PROCEDURE — 99207 ZZC NO CHARGE NURSE ONLY: CPT

## 2019-08-09 PROCEDURE — 36416 COLLJ CAPILLARY BLOOD SPEC: CPT

## 2019-08-09 PROCEDURE — 85610 PROTHROMBIN TIME: CPT | Mod: QW

## 2019-08-09 NOTE — PROGRESS NOTES
ANTICOAGULATION FOLLOW-UP CLINIC VISIT    Patient Name:  Ronna Coleamn  Date:  8/9/2019  Contact Type:  Face to Face    SUBJECTIVE:  Patient Findings         Clinical Outcomes     Negatives:   Major bleeding event, Thromboembolic event, Anticoagulation-related hospital admission, Anticoagulation-related ED visit, Anticoagulation-related fatality           OBJECTIVE    INR Protime   Date Value Ref Range Status   08/09/2019 3.0 (A) 0.86 - 1.14 Final       ASSESSMENT / PLAN  INR assessment THER    Recheck INR In: 3 WEEKS    INR Location Clinic      Anticoagulation Summary  As of 8/9/2019    INR goal:   2.0-3.0   TTR:   40.0 % (1.5 mo)   INR used for dosing:   3.0 (8/9/2019)   Warfarin maintenance plan:   10 mg (5 mg x 2) every Mon, Thu, Sat; 7.5 mg (5 mg x 1.5) all other days   Full warfarin instructions:   10 mg every Mon, Thu, Sat; 7.5 mg all other days   Weekly warfarin total:   60 mg   No change documented:   Mellisa Chiu RN   Plan last modified:   Elizabeth Recinos RN (7/25/2019)   Next INR check:   8/30/2019   Priority:   INR   Target end date:       Indications    Acute deep vein thrombosis (DVT) of other specified vein of right lower extremity (H) [I82.491]  Antiphospholipid antibody positive [R76.0]  Long term current use of anticoagulants with INR goal of 2.0-3.0 [Z79.01]             Anticoagulation Episode Summary     INR check location:   Clinic Lab    Preferred lab:       Send INR reminders to:   CHENTE SMILEY    Comments:   Unprovoked dvt  Concern for antiphospolipid antibody syndrome  Anticoagulation likely indefinite but will be reevaluated in 9/2019      Anticoagulation Care Providers     Provider Role Specialty Phone number    Ameya Bacon MD Responsible Hematology & Oncology [phone]            See the Encounter Report to view Anticoagulation Flowsheet and Dosing Calendar (Go to Encounters tab in chart review, and find the Anticoagulation Therapy Visit)    The patient was  assessed for diet, medication, and activity level changes, missed or extra doses, bruising or bleeding, with no problem findings.      Mellisa Chiu RN, BSN, PHN

## 2019-08-16 ENCOUNTER — MYC REFILL (OUTPATIENT)
Dept: PEDIATRICS | Facility: CLINIC | Age: 24
End: 2019-08-16

## 2019-08-16 ENCOUNTER — MYC REFILL (OUTPATIENT)
Dept: ONCOLOGY | Facility: CLINIC | Age: 24
End: 2019-08-16

## 2019-08-16 DIAGNOSIS — E06.3 HYPOTHYROIDISM DUE TO HASHIMOTO'S THYROIDITIS: ICD-10-CM

## 2019-08-16 DIAGNOSIS — I82.491 ACUTE DEEP VEIN THROMBOSIS (DVT) OF OTHER SPECIFIED VEIN OF RIGHT LOWER EXTREMITY (H): ICD-10-CM

## 2019-08-16 DIAGNOSIS — R76.0 ANTIPHOSPHOLIPID ANTIBODY POSITIVE: ICD-10-CM

## 2019-08-16 DIAGNOSIS — Z79.01 LONG TERM CURRENT USE OF ANTICOAGULANTS WITH INR GOAL OF 2.0-3.0: ICD-10-CM

## 2019-08-19 ENCOUNTER — MYC REFILL (OUTPATIENT)
Dept: PEDIATRICS | Facility: CLINIC | Age: 24
End: 2019-08-19

## 2019-08-19 DIAGNOSIS — I82.491 ACUTE DEEP VEIN THROMBOSIS (DVT) OF OTHER SPECIFIED VEIN OF RIGHT LOWER EXTREMITY (H): ICD-10-CM

## 2019-08-19 DIAGNOSIS — Z83.2 FAMILY HISTORY OF CLOTTING DISORDER: ICD-10-CM

## 2019-08-19 DIAGNOSIS — E06.3 HYPOTHYROIDISM DUE TO HASHIMOTO'S THYROIDITIS: ICD-10-CM

## 2019-08-19 RX ORDER — WARFARIN SODIUM 5 MG/1
TABLET ORAL
Qty: 153 TABLET | Refills: 0 | Status: SHIPPED | OUTPATIENT
Start: 2019-08-19 | End: 2019-10-17

## 2019-08-19 RX ORDER — LEVOTHYROXINE SODIUM 137 UG/1
137 TABLET ORAL DAILY
Qty: 30 TABLET | Refills: 0 | Status: CANCELLED | OUTPATIENT
Start: 2019-08-19

## 2019-08-19 RX ORDER — LEVOTHYROXINE SODIUM 137 UG/1
137 TABLET ORAL DAILY
Qty: 30 TABLET | Refills: 0 | Status: SHIPPED | OUTPATIENT
Start: 2019-08-19 | End: 2019-08-27 | Stop reason: DRUGHIGH

## 2019-08-19 RX ORDER — RIVAROXABAN 20 MG/1
TABLET, FILM COATED ORAL
Qty: 90 TABLET | Refills: 0 | OUTPATIENT
Start: 2019-08-19

## 2019-08-19 NOTE — TELEPHONE ENCOUNTER
Message sent to pharmacy.  Patient is on warfarin now.    The source prescription was discontinued on 6/21/2019 by Mellisa Chiu RN for the following reason: Therapy completed.    Anna Thomas RN, [facility]

## 2019-08-19 NOTE — TELEPHONE ENCOUNTER
Appointment and lab scheduled for 8/27/2019, paul to appointment.  Last TSH 9.08 on 5/24/19.  Dose adjusted and recheck due.    Refill authorized per [facility] protocol.    Aleksandra Salinas RN

## 2019-08-26 ENCOUNTER — TELEPHONE (OUTPATIENT)
Dept: ONCOLOGY | Facility: CLINIC | Age: 24
End: 2019-08-26

## 2019-08-26 NOTE — TELEPHONE ENCOUNTER
Patient's mom called regarding Ronna's INR/Medication Monitoring Labs requiring a prior authorization.    Intake has called to find out the appropriate person for the patient to work with regarding prior auth, and have learned that when it is for a lab it should be the nurse.    Please call the patient's mother, Glenny regarding this request, [phone] or [phone]

## 2019-08-27 ENCOUNTER — OFFICE VISIT (OUTPATIENT)
Dept: PEDIATRICS | Facility: CLINIC | Age: 24
End: 2019-08-27
Payer: COMMERCIAL

## 2019-08-27 VITALS
HEART RATE: 80 BPM | DIASTOLIC BLOOD PRESSURE: 74 MMHG | BODY MASS INDEX: 27.54 KG/M2 | TEMPERATURE: 97.7 F | HEIGHT: 70 IN | WEIGHT: 192.4 LBS | SYSTOLIC BLOOD PRESSURE: 111 MMHG | OXYGEN SATURATION: 98 %

## 2019-08-27 DIAGNOSIS — Z12.4 CERVICAL CANCER SCREENING: ICD-10-CM

## 2019-08-27 DIAGNOSIS — E03.9 ACQUIRED HYPOTHYROIDISM: ICD-10-CM

## 2019-08-27 DIAGNOSIS — Z00.00 ROUTINE GENERAL MEDICAL EXAMINATION AT A HEALTH CARE FACILITY: Primary | ICD-10-CM

## 2019-08-27 DIAGNOSIS — E66.3 OVERWEIGHT (BMI 25.0-29.9): ICD-10-CM

## 2019-08-27 DIAGNOSIS — R76.0 ANTIPHOSPHOLIPID ANTIBODY POSITIVE: ICD-10-CM

## 2019-08-27 DIAGNOSIS — E06.3 HYPOTHYROIDISM DUE TO HASHIMOTO'S THYROIDITIS: ICD-10-CM

## 2019-08-27 DIAGNOSIS — E10.9 TYPE 1 DIABETES MELLITUS WITHOUT COMPLICATION (H): ICD-10-CM

## 2019-08-27 DIAGNOSIS — K90.0 CELIAC DISEASE: ICD-10-CM

## 2019-08-27 DIAGNOSIS — Z11.4 SCREENING FOR HUMAN IMMUNODEFICIENCY VIRUS: ICD-10-CM

## 2019-08-27 LAB
HIV 1+2 AB+HIV1 P24 AG SERPL QL IA: NONREACTIVE
T4 FREE SERPL-MCNC: 1.05 NG/DL (ref 0.76–1.46)
TSH SERPL DL<=0.005 MIU/L-ACNC: 4.93 MU/L (ref 0.4–4)

## 2019-08-27 PROCEDURE — G0145 SCR C/V CYTO,THINLAYER,RESCR: HCPCS | Performed by: FAMILY MEDICINE

## 2019-08-27 PROCEDURE — 99213 OFFICE O/P EST LOW 20 MIN: CPT | Mod: 25 | Performed by: FAMILY MEDICINE

## 2019-08-27 PROCEDURE — 99395 PREV VISIT EST AGE 18-39: CPT | Performed by: FAMILY MEDICINE

## 2019-08-27 PROCEDURE — 84443 ASSAY THYROID STIM HORMONE: CPT | Performed by: FAMILY MEDICINE

## 2019-08-27 PROCEDURE — 36415 COLL VENOUS BLD VENIPUNCTURE: CPT | Performed by: FAMILY MEDICINE

## 2019-08-27 PROCEDURE — 84439 ASSAY OF FREE THYROXINE: CPT | Performed by: FAMILY MEDICINE

## 2019-08-27 PROCEDURE — 87389 HIV-1 AG W/HIV-1&-2 AB AG IA: CPT | Performed by: FAMILY MEDICINE

## 2019-08-27 RX ORDER — LEVOTHYROXINE SODIUM 150 UG/1
150 TABLET ORAL DAILY
Qty: 90 TABLET | Refills: 0 | Status: SHIPPED | OUTPATIENT
Start: 2019-08-27 | End: 2019-11-22

## 2019-08-27 ASSESSMENT — PAIN SCALES - GENERAL: PAINLEVEL: NO PAIN (0)

## 2019-08-27 ASSESSMENT — MIFFLIN-ST. JEOR: SCORE: 1710.03

## 2019-08-27 NOTE — RESULT ENCOUNTER NOTE
Julio Cesar Friend,  Your thyroid labs are improved, but still not at goal. Let us increase the dose to 150 MCG daily, a new script was sent to the pharmacy today. We will recheck the labs in 6-8 weeks.  Let me know if you have any questions. Take care.  Jose Tierney MD

## 2019-08-27 NOTE — PROGRESS NOTES
SUBJECTIVE:   CC: Ronna Coleman is an 23 year old woman who presents for preventive health visit.     Healthy Habits:     Getting at least 3 servings of Calcium per day:  Yes    Bi-annual eye exam:  Yes    Dental care twice a year:  Yes    Sleep apnea or symptoms of sleep apnea:  None    Diet:  Gluten-free/reduced    Frequency of exercise:  None    Taking medications regularly:  Yes    Medication side effects:  None    PHQ-2 Total Score: 0    Additional concerns today:  No      Diabetes Follow-up    How often are you checking your blood sugar? Three times daily    What time of day are you checking your blood sugars (select all that apply)?  Before meals    Have you had any blood sugars above 200?  Yes , sometimes    Have you had any blood sugars below 70?  Yes , sometimes    What symptoms do you notice when your blood sugar is low?  Shaky and Dizzy    What concerns do you have today about your diabetes? None     Do you have any of these symptoms? (Select all that apply)  No numbness or tingling in feet.  No redness, sores or blisters on feet.  No complaints of excessive thirst.  No reports of blurry vision.  No significant changes to weight.     Have you had a diabetic eye exam in the last 12 months? Yes- Date of last eye exam: 9/2019 at [facility]    BP Readings from Last 2 Encounters:   06/11/19 123/75   05/24/19 107/74     Hemoglobin A1C (%)   Date Value   05/17/2019 8.1 (H)     LDL Cholesterol Calculated (mg/dL)   Date Value   02/15/2019 100   03/22/2017 119       Diabetes Management Resources    Hypothyroidism Follow-up    Since last visit, patient describes the following symptoms: Weight stable, no hair loss, no skin changes, no constipation, no loose stools          Today's PHQ-2 Score:   PHQ-2 ( 1999 Pf[employer]) 8/24/2019   Q1: Little interest or pleasure in doing things 0   Q2: Feeling down, depressed or hopeless 0   PHQ-2 Score 0   Q1: Little interest or pleasure in doing things Not at all   Q2:  Feeling down, depressed or hopeless Not at all   PHQ-2 Score 0       Abuse: Current or Past(Physical, Sexual or Emotional)- No  Do you feel safe in your environment? Yes    Social History     Tobacco Use     Smoking status: Never Smoker     Smokeless tobacco: Never Used   Substance Use Topics     Alcohol use: Not Currently       Alcohol Use 8/24/2019   Prescreen: >3 drinks/day or >7 drinks/week? No       Reviewed orders with patient.  Reviewed health maintenance and updated orders accordingly - Yes  Lab work is in process  Labs reviewed in EPIC  BP Readings from Last 3 Encounters:   08/27/19 111/74   06/11/19 123/75   05/24/19 107/74    Wt Readings from Last 3 Encounters:   08/27/19 87.3 kg (192 lb 6.4 oz)   06/11/19 88 kg (193 lb 15 oz)   05/24/19 88.9 kg (196 lb)                  Patient Active Problem List   Diagnosis     Hypothyroidism     Type 1 diabetes (H)     Celiac disease     Family history of clotting disorder     Acute deep vein thrombosis (DVT) of other specified vein of right lower extremity (H)     Antiphospholipid antibody positive     Long term current use of anticoagulants with INR goal of 2.0-3.0     Past Surgical History:   Procedure Laterality Date     NO HISTORY OF SURGERY         Social History     Tobacco Use     Smoking status: Never Smoker     Smokeless tobacco: Never Used   Substance Use Topics     Alcohol use: Not Currently     Family History   Problem Relation Age of Onset     Hypertension Father      Hypertension Maternal Grandfather      Hypertension Paternal Grandfather      Diabetes Paternal Uncle      Clotting Disorder Maternal Grandmother          Current Outpatient Medications   Medication Sig Dispense Refill     fish oil-omega-3 fatty acids 1000 MG capsule Take 1 g by mouth daily       insulin glargine (BASAGLAR KWIKPEN) 100 UNIT/ML pen Inject 45 Units Subcutaneous       Insulin Infusion Pump Supplies (MINIMED RESERVOIR 3ML) MISC Change every 3 days, 3 boxes of 10 for 90 days        insulin lispro (HUMALOG) 100 UNIT/ML vial        levothyroxine (SYNTHROID/LEVOTHROID) 150 MCG tablet Take 1 tablet (150 mcg) by mouth daily Dose change 90 tablet 0     warfarin (COUMADIN) 5 MG tablet 10 mg (5 mg x 2) every Mon, Thu, Sat; 7.5 mg (5 mg x 1.5) all other days 153 tablet 0     enoxaparin (LOVENOX) 100 MG/ML syringe Inject 0.9 mLs (90 mg) Subcutaneous every 12 hours (Patient not taking: Reported on 8/27/2019) 12.6 mL 1     No Known Allergies  Recent Labs   Lab Test 08/27/19  1006 05/24/19  1138 05/17/19 02/15/19  03/22/17 07/28/16   A1C  --   --  8.1*  --   --   --   --    LDL  --   --   --  100  --  119 143*   HDL  --   --   --  43  --  42  --    TRIG  --   --   --  52  --  176*  --    ALT  --  24  --   --   --   --   --    CR  --  0.66  --  0.69   < >  --  0.64   GFRESTIMATED  --  >90  --  >60   < >  --  >60   GFRESTBLACK  --  >90  --  >60   < >  --  >60   POTASSIUM  --  4.0  --   --   --   --   --    TSH 4.93* 9.08*  --  5.01*   < >  --  1.89    < > = values in this interval not displayed.        Mammogram not appropriate for this patient based on age.    Pertinent mammograms are reviewed under the imaging tab.  History of abnormal Pap smear: NO - age 21-29 PAP every 3 years recommended     Reviewed and updated as needed this visit by clinical staff         Reviewed and updated as needed this visit by Provider          Past Medical History:   Diagnosis Date     Celiac disease      Hypothyroidism      Type 1 diabetes (H)       Past Surgical History:   Procedure Laterality Date     NO HISTORY OF SURGERY       OB History   No data available       Review of Systems  CONSTITUTIONAL: NEGATIVE for fever, chills, change in weight  INTEGUMENTARU/SKIN: NEGATIVE for worrisome rashes, moles or lesions  EYES: NEGATIVE for vision changes or irritation  ENT: NEGATIVE for ear, mouth and throat problems  RESP: NEGATIVE for significant cough or SOB  BREAST: NEGATIVE for masses, tenderness or discharge  CV: NEGATIVE  for chest pain, palpitations or peripheral edema  GI: NEGATIVE for nausea, abdominal pain, heartburn, or change in bowel habits  : NEGATIVE for unusual urinary or vaginal symptoms. Periods are regular.  MUSCULOSKELETAL: NEGATIVE for significant arthralgias or myalgia  NEURO: NEGATIVE for weakness, dizziness or paresthesias  ENDOCRINE: NEGATIVE for temperature intolerance, skin/hair changes  ENDOCRINE: Hx diabetes and Hx thyroid disease  HEME/ALLERGY/IMMUNE: NEGATIVE for bleeding problems  PSYCHIATRIC: NEGATIVE for changes in mood or affect     OBJECTIVE:   There were no vitals taken for this visit.  Physical Exam  GENERAL: healthy, alert and no distress  EYES: Eyes grossly normal to inspection, PERRL and conjunctivae and sclerae normal  HENT: ear canals and TM's normal, nose and mouth without ulcers or lesions  NECK: no adenopathy, no asymmetry, masses, or scars and thyroid normal to palpation  RESP: lungs clear to auscultation - no rales, rhonchi or wheezes  BREAST: normal without masses, tenderness or nipple discharge and no palpable axillary masses or adenopathy  CV: regular rate and rhythm, normal S1 S2, no S3 or S4, no murmur, click or rub, no peripheral edema and peripheral pulses strong  ABDOMEN: soft, nontender, no hepatosplenomegaly, no masses and bowel sounds normal   (female): normal female external genitalia, normal urethral meatus, vaginal mucosa pink, moist, well rugated, and normal cervix/adnexa/uterus without masses or discharge  MS: no gross musculoskeletal defects noted, no edema  SKIN: no suspicious lesions or rashes  NEURO: Normal strength and tone, mentation intact and speech normal  PSYCH: mentation appears normal, affect normal/bright    Diagnostic Test Results:  Labs reviewed in Epic  Results for orders placed or performed in visit on 08/27/19 (from the past 24 hour(s))   **TSH with free T4 reflex FUTURE anytime   Result Value Ref Range    TSH 4.93 (H) 0.40 - 4.00 mU/L   T4 free   Result  Value Ref Range    T4 Free 1.05 0.76 - 1.46 ng/dL       ASSESSMENT/PLAN:   1. Routine general medical examination at a health care facility  Discussed on regular exercises, healthy eating, self breast exams monthly and routine dental checks.    - Pap imaged thin layer screen only - recommended age 21 - 24 years  - HIV Antigen Antibody Combo    2. Hypothyroidism due to Hashimoto's thyroiditis  TSH   Date Value Ref Range Status   08/27/2019 4.93 (H) 0.40 - 4.00 mU/L Final   05/24/2019 9.08 (H) 0.40 - 4.00 mU/L Final   02/15/2019 5.01 (A) 0.30 - 4.50 uIU/mL Final   04/13/2017 2.94 0.30 - 4.50 uIU/mL Final   07/28/2016 1.89 0.20 - 4.50 uIU/mL Final     T4 Free   Date Value Ref Range Status   08/27/2019 1.05 0.76 - 1.46 ng/dL Final   05/24/2019 1.11 0.76 - 1.46 ng/dL Final     Thyroid labs-improved but still abnormal.    Will recommend to increase the dose of levothyroxine from 137 MCG up to 150 MCG daily, will recheck thyroid labs in 6 to 8 weeks.    - levothyroxine (SYNTHROID/LEVOTHROID) 150 MCG tablet; Take 1 tablet (150 mcg) by mouth daily Dose change  Dispense: 90 tablet; Refill: 0    3. Antiphospholipid antibody positive  On chronic anticoagulation with Coumadin    4. Type 1 diabetes mellitus without complication (H)  Continue to follow-up [facility] at [facility]Dr. Landy Leonard    5. Cervical cancer screening    - Pap imaged thin layer screen only - recommended age 21 - 24 years    6. Screening for human immunodeficiency virus    - HIV Antigen Antibody Combo    7. Celiac disease  Continue gluten-free diet    8. Overweight (BMI 25.0-29.9)  Wt Readings from Last 5 Encounters:   08/27/19 87.3 kg (192 lb 6.4 oz)   06/11/19 88 kg (193 lb 15 oz)   05/24/19 88.9 kg (196 lb)     Emphasized on weight loss, portion control, low calorie and low fat diet, healthy eating, regular exercises.        COUNSELING:  Reviewed preventive health counseling, as reflected in patient instructions  Special attention given  "to:        Regular exercise       Healthy diet/nutrition       Vision screening       Contraception       Family planning       Folic Acid Counseling       Safe sex practices/STD prevention    Estimated body mass index is 28.43 kg/m  as calculated from the following:    Height as of 6/11/19: 1.759 m (5' 9.25\").    Weight as of 6/11/19: 88 kg (193 lb 15 oz).    Weight management plan: Discussed healthy diet and exercise guidelines     reports that she has never smoked. She has never used smokeless tobacco.      Counseling Resources:  ATP IV Guidelines  Pooled Cohorts Equation Calculator  Breast Cancer Risk Calculator  FRAX Risk Assessment  ICSI Preventive Guidelines  Dietary Guidelines for Americans, 2010  USDA's MyPlate  ASA Prophylaxis  Lung CA Screening    Jose Tierney MD  [facility]  Chart documentation done in part with Dragon Voice recognition Software. Although reviewed after completion, some word and grammatical error may remain.    "

## 2019-08-28 NOTE — RESULT ENCOUNTER NOTE
Dear Ronna,  Your test results for HIV screening is negative, this is good and reassuring.   Let me know if you have any questions. Take care.  Jose Tierney MD

## 2019-08-29 ENCOUNTER — ANCILLARY PROCEDURE (OUTPATIENT)
Dept: ULTRASOUND IMAGING | Facility: CLINIC | Age: 24
End: 2019-08-29
Attending: INTERNAL MEDICINE
Payer: COMMERCIAL

## 2019-08-29 DIAGNOSIS — I82.491 ACUTE DEEP VEIN THROMBOSIS (DVT) OF OTHER SPECIFIED VEIN OF RIGHT LOWER EXTREMITY (H): ICD-10-CM

## 2019-08-29 LAB — FACT VIII ACT/NOR PPP: 257 % (ref 55–200)

## 2019-08-29 PROCEDURE — 85597 PHOSPHOLIPID PLTLT NEUTRALIZ: CPT | Performed by: INTERNAL MEDICINE

## 2019-08-29 PROCEDURE — 86147 CARDIOLIPIN ANTIBODY EA IG: CPT | Mod: 59 | Performed by: INTERNAL MEDICINE

## 2019-08-29 PROCEDURE — 00000167 ZZHCL STATISTIC INR NC: Performed by: INTERNAL MEDICINE

## 2019-08-29 PROCEDURE — 00000401 ZZHCL STATISTIC THROMBIN TIME NC: Performed by: INTERNAL MEDICINE

## 2019-08-29 PROCEDURE — 36415 COLL VENOUS BLD VENIPUNCTURE: CPT | Performed by: INTERNAL MEDICINE

## 2019-08-29 PROCEDURE — 93971 EXTREMITY STUDY: CPT | Mod: RT

## 2019-08-29 PROCEDURE — 85613 RUSSELL VIPER VENOM DILUTED: CPT | Mod: 59 | Performed by: INTERNAL MEDICINE

## 2019-08-29 PROCEDURE — 85240 CLOT FACTOR VIII AHG 1 STAGE: CPT | Performed by: INTERNAL MEDICINE

## 2019-08-29 PROCEDURE — 85730 THROMBOPLASTIN TIME PARTIAL: CPT | Performed by: INTERNAL MEDICINE

## 2019-08-30 ENCOUNTER — ANTICOAGULATION THERAPY VISIT (OUTPATIENT)
Dept: NURSING | Facility: CLINIC | Age: 24
End: 2019-08-30
Payer: COMMERCIAL

## 2019-08-30 DIAGNOSIS — I82.491 ACUTE DEEP VEIN THROMBOSIS (DVT) OF OTHER SPECIFIED VEIN OF RIGHT LOWER EXTREMITY (H): ICD-10-CM

## 2019-08-30 DIAGNOSIS — R76.0 ANTIPHOSPHOLIPID ANTIBODY POSITIVE: ICD-10-CM

## 2019-08-30 DIAGNOSIS — Z79.01 LONG TERM CURRENT USE OF ANTICOAGULANTS WITH INR GOAL OF 2.0-3.0: ICD-10-CM

## 2019-08-30 LAB
CARDIOLIPIN ANTIBODY IGG: 47.2 GPL-U/ML (ref 0–19.9)
CARDIOLIPIN ANTIBODY IGM: 5.1 MPL-U/ML (ref 0–19.9)
INR POINT OF CARE: 3.5 (ref 0.86–1.14)

## 2019-08-30 PROCEDURE — 85610 PROTHROMBIN TIME: CPT | Mod: QW

## 2019-08-30 PROCEDURE — 99207 ZZC NO CHARGE NURSE ONLY: CPT

## 2019-08-30 PROCEDURE — 36416 COLLJ CAPILLARY BLOOD SPEC: CPT

## 2019-08-30 NOTE — PROGRESS NOTES
ANTICOAGULATION FOLLOW-UP CLINIC VISIT    Patient Name:  Ronna Coleman  Date:  8/30/2019  Contact Type:  Face to Face    SUBJECTIVE:  Patient Findings         Clinical Outcomes     Negatives:   Major bleeding event, Thromboembolic event, Anticoagulation-related hospital admission, Anticoagulation-related ED visit, Anticoagulation-related fatality           OBJECTIVE    INR Protime   Date Value Ref Range Status   08/30/2019 3.5 (A) 0.86 - 1.14 Final       ASSESSMENT / PLAN  INR assessment SUPRA    Recheck INR In: 1 WEEK    INR Location Clinic      Anticoagulation Summary  As of 8/30/2019    INR goal:   2.0-3.0   TTR:   27.5 % (2.2 mo)   INR used for dosing:   3.5! (8/30/2019)   Warfarin maintenance plan:   10 mg (5 mg x 2) every Mon, Thu, Sat; 7.5 mg (5 mg x 1.5) all other days   Full warfarin instructions:   8/31: 7.5 mg; Otherwise 10 mg every Mon, Thu, Sat; 7.5 mg all other days   Weekly warfarin total:   60 mg   Plan last modified:   Elizabeth Recinos RN (7/25/2019)   Next INR check:   9/6/2019   Priority:   INR   Target end date:       Indications    Acute deep vein thrombosis (DVT) of other specified vein of right lower extremity (H) [I82.491]  Antiphospholipid antibody positive [R76.0]  Long term current use of anticoagulants with INR goal of 2.0-3.0 [Z79.01]             Anticoagulation Episode Summary     INR check location:   Clinic Lab    Preferred lab:       Send INR reminders to:   CHENTE SMILEY    Comments:   Unprovoked dvt  Concern for antiphospolipid antibody syndrome  Anticoagulation likely indefinite but will be reevaluated in 9/2019      Anticoagulation Care Providers     Provider Role Specialty Phone number    Ameya Bacon MD Responsible Hematology & Oncology [phone]            See the Encounter Report to view Anticoagulation Flowsheet and Dosing Calendar (Go to Encounters tab in chart review, and find the Anticoagulation Therapy Visit)    Dosage adjustment made based on  physician directed care plan. Decreased weekly dosage by 4% and will recheck in 1 week. Patient denies any identifiable changes that caused the supra therapeutic INR. Also discussed vitamin K intake.           Mellisa Chiu RN, BSN, PHN

## 2019-08-31 LAB
COPATH REPORT: NORMAL
LA PPP-IMP: POSITIVE
PAP: NORMAL

## 2019-09-06 ENCOUNTER — ANTICOAGULATION THERAPY VISIT (OUTPATIENT)
Dept: NURSING | Facility: CLINIC | Age: 24
End: 2019-09-06
Payer: COMMERCIAL

## 2019-09-06 DIAGNOSIS — Z79.01 LONG TERM CURRENT USE OF ANTICOAGULANTS WITH INR GOAL OF 2.0-3.0: ICD-10-CM

## 2019-09-06 DIAGNOSIS — I82.491 ACUTE DEEP VEIN THROMBOSIS (DVT) OF OTHER SPECIFIED VEIN OF RIGHT LOWER EXTREMITY (H): ICD-10-CM

## 2019-09-06 DIAGNOSIS — R76.0 ANTIPHOSPHOLIPID ANTIBODY POSITIVE: ICD-10-CM

## 2019-09-06 LAB — INR POINT OF CARE: 2.9 (ref 0.86–1.14)

## 2019-09-06 PROCEDURE — 85610 PROTHROMBIN TIME: CPT | Mod: QW

## 2019-09-06 PROCEDURE — 36416 COLLJ CAPILLARY BLOOD SPEC: CPT

## 2019-09-06 PROCEDURE — 99207 ZZC NO CHARGE NURSE ONLY: CPT

## 2019-09-06 NOTE — PROGRESS NOTES
ANTICOAGULATION FOLLOW-UP CLINIC VISIT    Patient Name:  Ronna Coleman  Date:  2019  Contact Type:  Face to Face    SUBJECTIVE:  Patient Findings         Clinical Outcomes     Negatives:   Major bleeding event, Thromboembolic event, Anticoagulation-related hospital admission, Anticoagulation-related ED visit, Anticoagulation-related fatality           OBJECTIVE    INR Protime   Date Value Ref Range Status   2019 2.9 (A) 0.86 - 1.14 Final       ASSESSMENT / PLAN  INR assessment THER    Recheck INR In: 2 WEEKS    INR Location Clinic      Anticoagulation Summary  As of 2019    INR goal:   2.0-3.0   TTR:   26.5 % (2.5 mo)   INR used for dosin.9 (2019)   Warfarin maintenance plan:   10 mg (5 mg x 2) every Mon, Thu; 7.5 mg (5 mg x 1.5) all other days   Full warfarin instructions:   10 mg every Mon, Thu; 7.5 mg all other days   Weekly warfarin total:   57.5 mg   Plan last modified:   Elizabeth Recinos RN (2019)   Next INR check:   2019   Priority:   INR   Target end date:       Indications    Acute deep vein thrombosis (DVT) of other specified vein of right lower extremity (H) [I82.491]  Antiphospholipid antibody positive [R76.0]  Long term current use of anticoagulants with INR goal of 2.0-3.0 [Z79.01]             Anticoagulation Episode Summary     INR check location:   Clinic Lab    Preferred lab:       Send INR reminders to:   CHENTE SMILEY    Comments:   Unprovoked dvt  Concern for antiphospolipid antibody syndrome  Anticoagulation likely indefinite but will be reevaluated in 2019      Anticoagulation Care Providers     Provider Role Specialty Phone number    Ameya Bacon MD Responsible Hematology & Oncology [phone]            See the Encounter Report to view Anticoagulation Flowsheet and Dosing Calendar (Go to Encounters tab in chart review, and find the Anticoagulation Therapy Visit)    Therapeutic INR 2.9. Will continue maintenance dose and recheck in 2  week(s).    Patient states negative for signs and symptoms of bleeding or blood clots, changes in medication, changes in diet, any signs of infection or antibiotic use, anything new OTC or herbal medications, any missed or extra doses of the warfarin.    Patient informed of the symptoms to be seen for either by INR nurse or ER.    Elizabeth Recinos RN

## 2019-09-14 DIAGNOSIS — E06.3 HYPOTHYROIDISM DUE TO HASHIMOTO'S THYROIDITIS: ICD-10-CM

## 2019-09-16 RX ORDER — LEVOTHYROXINE SODIUM 137 UG/1
TABLET ORAL
Qty: 30 TABLET | Refills: 0 | OUTPATIENT
Start: 2019-09-16

## 2019-09-18 ENCOUNTER — TELEPHONE (OUTPATIENT)
Dept: PEDIATRICS | Facility: CLINIC | Age: 24
End: 2019-09-18

## 2019-09-18 NOTE — TELEPHONE ENCOUNTER
Reviewed the information from her insurance regarding denial for coverage on factor V Leyden mutation analysis testing.  Letter done to appeal.  Please fax the letter to the insurance, as given in the denial letter

## 2019-09-20 ENCOUNTER — ANTICOAGULATION THERAPY VISIT (OUTPATIENT)
Dept: NURSING | Facility: CLINIC | Age: 24
End: 2019-09-20
Payer: COMMERCIAL

## 2019-09-20 DIAGNOSIS — R76.0 ANTIPHOSPHOLIPID ANTIBODY POSITIVE: ICD-10-CM

## 2019-09-20 DIAGNOSIS — Z79.01 LONG TERM CURRENT USE OF ANTICOAGULANTS WITH INR GOAL OF 2.0-3.0: ICD-10-CM

## 2019-09-20 DIAGNOSIS — I82.491 ACUTE DEEP VEIN THROMBOSIS (DVT) OF OTHER SPECIFIED VEIN OF RIGHT LOWER EXTREMITY (H): ICD-10-CM

## 2019-09-20 LAB — INR POINT OF CARE: 3.9 (ref 0.86–1.14)

## 2019-09-20 PROCEDURE — 85610 PROTHROMBIN TIME: CPT | Mod: QW

## 2019-09-20 PROCEDURE — 36416 COLLJ CAPILLARY BLOOD SPEC: CPT

## 2019-09-20 PROCEDURE — 99207 ZZC NO CHARGE NURSE ONLY: CPT

## 2019-09-20 NOTE — PROGRESS NOTES
ANTICOAGULATION FOLLOW-UP CLINIC VISIT    Patient Name:  Ronna Coleman  Date:  9/20/2019  Contact Type:  Face to Face    SUBJECTIVE:  Patient Findings     Positives:   Change in diet/appetite (Stopped her smoothies )        Clinical Outcomes     Negatives:   Major bleeding event, Thromboembolic event, Anticoagulation-related hospital admission, Anticoagulation-related ED visit, Anticoagulation-related fatality           OBJECTIVE    INR Protime   Date Value Ref Range Status   09/20/2019 3.9 (A) 0.86 - 1.14 Final       ASSESSMENT / PLAN  INR assessment SUPRA    Recheck INR In: 1 WEEK    INR Location Clinic      Anticoagulation Summary  As of 9/20/2019    INR goal:   2.0-3.0   TTR:   23.9 % (2.9 mo)   INR used for dosing:   3.9! (9/20/2019)   Warfarin maintenance plan:   10 mg (5 mg x 2) every Mon, Thu; 7.5 mg (5 mg x 1.5) all other days   Full warfarin instructions:   9/23: 7.5 mg; Otherwise 10 mg every Mon, Thu; 7.5 mg all other days   Weekly warfarin total:   57.5 mg   Plan last modified:   Elizabeth Recinos RN (9/6/2019)   Next INR check:   9/27/2019   Priority:   INR   Target end date:       Indications    Acute deep vein thrombosis (DVT) of other specified vein of right lower extremity (H) [I82.491]  Antiphospholipid antibody positive [R76.0]  Long term current use of anticoagulants with INR goal of 2.0-3.0 [Z79.01]             Anticoagulation Episode Summary     INR check location:   Clinic Lab    Preferred lab:       Send INR reminders to:   CHENTE SMILEY    Comments:   Unprovoked dvt  Concern for antiphospolipid antibody syndrome  Anticoagulation likely indefinite but will be reevaluated in 9/2019      Anticoagulation Care Providers     Provider Role Specialty Phone number    Ameya Bacon MD Responsible Hematology & Oncology [phone]            See the Encounter Report to view Anticoagulation Flowsheet and Dosing Calendar (Go to Encounters tab in chart review, and find the  Anticoagulation Therapy Visit)    Dosage adjustment made based on physician directed care plan. Weekly dosage decreased by 4%. Ramin has not been having her vegetable smoothies for a week or so; that is most likely causing the increase in her INR this week. Will recheck in 1 week.       Mellisa Chiu RN, BSN, PHN

## 2019-09-27 ENCOUNTER — ANTICOAGULATION THERAPY VISIT (OUTPATIENT)
Dept: NURSING | Facility: CLINIC | Age: 24
End: 2019-09-27
Payer: COMMERCIAL

## 2019-09-27 DIAGNOSIS — Z79.01 LONG TERM CURRENT USE OF ANTICOAGULANTS WITH INR GOAL OF 2.0-3.0: ICD-10-CM

## 2019-09-27 DIAGNOSIS — R76.0 ANTIPHOSPHOLIPID ANTIBODY POSITIVE: ICD-10-CM

## 2019-09-27 DIAGNOSIS — I82.491 ACUTE DEEP VEIN THROMBOSIS (DVT) OF OTHER SPECIFIED VEIN OF RIGHT LOWER EXTREMITY (H): ICD-10-CM

## 2019-09-27 LAB — INR POINT OF CARE: 3 (ref 0.86–1.14)

## 2019-09-27 PROCEDURE — 36416 COLLJ CAPILLARY BLOOD SPEC: CPT

## 2019-09-27 PROCEDURE — 85610 PROTHROMBIN TIME: CPT | Mod: QW

## 2019-09-27 PROCEDURE — 99207 ZZC NO CHARGE NURSE ONLY: CPT

## 2019-09-27 NOTE — PROGRESS NOTES
ANTICOAGULATION FOLLOW-UP CLINIC VISIT    Patient Name:  Ronna Coleman  Date:  9/27/2019  Contact Type:  Face to Face    SUBJECTIVE:  Patient Findings     Positives:   Change in diet/appetite (stop meal replacement that had K)        Clinical Outcomes     Negatives:   Major bleeding event, Thromboembolic event, Anticoagulation-related hospital admission, Anticoagulation-related ED visit, Anticoagulation-related fatality           OBJECTIVE    INR Protime   Date Value Ref Range Status   09/27/2019 3.0 (A) 0.86 - 1.14 Final       ASSESSMENT / PLAN  INR assessment THER    Recheck INR In: 2 WEEKS    INR Location Clinic      Anticoagulation Summary  As of 9/27/2019    INR goal:   2.0-3.0   TTR:   22.1 % (3.2 mo)   INR used for dosing:   3.0 (9/27/2019)   Warfarin maintenance plan:   10 mg (5 mg x 2) every Thu; 7.5 mg (5 mg x 1.5) all other days   Full warfarin instructions:   10 mg every Thu; 7.5 mg all other days   Weekly warfarin total:   55 mg   Plan last modified:   Elizabeth Recinos RN (9/27/2019)   Next INR check:   10/11/2019   Priority:   INR   Target end date:       Indications    Acute deep vein thrombosis (DVT) of other specified vein of right lower extremity (H) [I82.491]  Antiphospholipid antibody positive [R76.0]  Long term current use of anticoagulants with INR goal of 2.0-3.0 [Z79.01]             Anticoagulation Episode Summary     INR check location:   Clinic Lab    Preferred lab:       Send INR reminders to:   CHENTE SMILEY    Comments:   Unprovoked dvt  Concern for antiphospolipid antibody syndrome  Anticoagulation likely indefinite but will be reevaluated in 9/2019      Anticoagulation Care Providers     Provider Role Specialty Phone number    Ameya Bacon MD Responsible Hematology & Oncology [phone]            See the Encounter Report to view Anticoagulation Flowsheet and Dosing Calendar (Go to Encounters tab in chart review, and find the Anticoagulation Therapy  Visit)    Therapeutic INR 3.0. Will continue maintenance dose and recheck in 2 week(s).    Patient states negative for signs and symptoms of bleeding or blood clots, changes in medication, any signs of infection or antibiotic use, anything new OTC or herbal medications, any missed or extra doses of the warfarin.    Patient informed of the symptoms to be seen for either by INR nurse or ER.    Patient ask if refill of warfarin is needed. Rx sent no    Elizabeth Recinos RN

## 2019-10-04 ENCOUNTER — ONCOLOGY VISIT (OUTPATIENT)
Dept: ONCOLOGY | Facility: CLINIC | Age: 24
End: 2019-10-04
Payer: COMMERCIAL

## 2019-10-04 VITALS
HEIGHT: 69 IN | OXYGEN SATURATION: 97 % | TEMPERATURE: 98.8 F | SYSTOLIC BLOOD PRESSURE: 125 MMHG | HEART RATE: 74 BPM | RESPIRATION RATE: 16 BRPM | BODY MASS INDEX: 29.49 KG/M2 | WEIGHT: 199.1 LBS | DIASTOLIC BLOOD PRESSURE: 75 MMHG

## 2019-10-04 DIAGNOSIS — I82.491 ACUTE DEEP VEIN THROMBOSIS (DVT) OF OTHER SPECIFIED VEIN OF RIGHT LOWER EXTREMITY (H): Primary | ICD-10-CM

## 2019-10-04 PROCEDURE — 99214 OFFICE O/P EST MOD 30 MIN: CPT | Performed by: INTERNAL MEDICINE

## 2019-10-04 ASSESSMENT — PAIN SCALES - GENERAL: PAINLEVEL: NO PAIN (0)

## 2019-10-04 ASSESSMENT — MIFFLIN-ST. JEOR: SCORE: 1740.23

## 2019-10-04 NOTE — LETTER
10/4/2019         RE: Ronna Coleman  [address]  [place] [address]        Dear Colleague,    Thank you for referring your patient, Ronna Coleman, to the [facility]. Please see a copy of my visit note below.    Hematology follow up visit:  Date on this visit: 10/4/2019     Ronna Coleman  is referred by Dr.Sarulatha HARRISON Tierney for a hematology consultation. She requires evaluation for new diagnosis of DVT.    Primary Physician: Jose Tierney     History Of Present Illness:  Ms. Coleman is a 23 year old female who recently was found to have Right sided occlusive DVT in the mid-distal femoral vein which continues through the popliteal and calf veins.  She has a history of type 1 diabetes, celiac disease and hypothyroidism but she was doing well and she was in her usual state of health when around mid May 2019 she started to noticing stretching and tension in her right calf area and it was difficult for her to walk properly.  There was also some swelling associated.  She did not notice any skin discoloration.  On 5/18/2019 she had the ultrasound done which showed the clot as mentioned above.  At that point she was started on Xarelto.  Within a week of starting Xarelto the swelling resolved and about after 2-1/2 weeks she noticed that the pain has also almost completely resolved.  During this time she had a work-up done which showed unremarkable CBC/comprehensive metabolic panel.  She was found to have positive anticardiolipin IgG antibody of 52.8.  Anticardiolipin IgM antibody was negative beta-2 glycoprotein antibodies were negative.  Lupus anticoagulant was not tested because she was on Xarelto.  Factor VIII assay was mildly elevated at 227.  Antithrombin III, protein C, protein S were unremarkable.  Factor V Leiden was negative.  Factor II mutation was not tested.    When I saw him in June 2019, I was concerned that she could possibly have antiphospholipid  antibody syndrome so I recommended switching to Lovenox followed by warfarin and repeating her labs in 3 months.  We also decided on repeating ultrasound in 3 months.    She comes in today accompanied by her mother.  She has been doing well.  She is tolerating Coumadin well without any problems.  Denies any significant issues with bleeding.  Menstrual bleeding is not heavy.  Denies any problems with her legs and her legs feel normal.  Denies any shortness of breath.  No new swellings.  No new skin problems.    On the repeat testing, the follow-up ultrasound on 8/29/2019 shows decreased clot burden in the right lower extremity with persistent occlusive thrombus in the right popliteal and posterior tibial veins which appear chronic.  Lupus anticoagulant now is positive.  Anticardiolipin IgG antibody is persistently positive at 47.2.  Anticardiolipin IgM antibody and anti-beta-2 glycoprotein antibodies are negative.  Factor VIII level is 257.      ROS:  Rest of the comprehensive review of the system was essentially unremarkable.    I reviewed other history in epic as below.    Past Medical/Surgical History:  Past Medical History:   Diagnosis Date     Celiac disease      Hypothyroidism      Type 1 diabetes (H)      Past Surgical History:   Procedure Laterality Date     NO HISTORY OF SURGERY       Allergies:  Allergies as of 10/04/2019     (No Known Allergies)     Current Medications:  Current Outpatient Medications   Medication Sig Dispense Refill     enoxaparin (LOVENOX) 100 MG/ML syringe Inject 0.9 mLs (90 mg) Subcutaneous every 12 hours 12.6 mL 1     fish oil-omega-3 fatty acids 1000 MG capsule Take 1 g by mouth daily       insulin glargine (BASAGLAR KWIKPEN) 100 UNIT/ML pen Inject 45 Units Subcutaneous       Insulin Infusion Pump Supplies (MINIMED RESERVOIR 3ML) MISC Change every 3 days, 3 boxes of 10 for 90 days       insulin lispro (HUMALOG) 100 UNIT/ML vial        levothyroxine (SYNTHROID/LEVOTHROID) 150 MCG  tablet Take 1 tablet (150 mcg) by mouth daily Dose change 90 tablet 0     warfarin (COUMADIN) 5 MG tablet 10 mg (5 mg x 2) every Mon, Thu, Sat; 7.5 mg (5 mg x 1.5) all other days 153 tablet 0      Family History:  Family History   Problem Relation Age of Onset     Hypertension Father      Hypertension Maternal Grandfather      Hypertension Paternal Grandfather      Diabetes Paternal Uncle      Clotting Disorder Maternal Grandmother      Maternal grandmother had developed blood clots in her mid 70s.  She has 2 siblings who are healthy.  Mother had varicose veins and superficial phlebitis after a pregnancy.  Social History:  Social History     Socioeconomic History     Marital status: Single     Spouse name: Not on file     Number of children: Not on file     Years of education: Not on file     Highest education level: Not on file   Occupational History     Not on file   Social Needs     Financial resource strain: Not on file     Food insecurity:     Worry: Not on file     Inability: Not on file     Transportation needs:     Medical: Not on file     Non-medical: Not on file   Tobacco Use     Smoking status: Never Smoker     Smokeless tobacco: Never Used   Substance and Sexual Activity     Alcohol use: Not Currently     Drug use: Not Currently     Sexual activity: Never   Lifestyle     Physical activity:     Days per week: Not on file     Minutes per session: Not on file     Stress: Not on file   Relationships     Social connections:     Talks on phone: Not on file     Gets together: Not on file     Attends Religion service: Not on file     Active member of club or organization: Not on file     Attends meetings of clubs or organizations: Not on file     Relationship status: Not on file     Intimate partner violence:     Fear of current or ex partner: Not on file     Emotionally abused: Not on file     Physically abused: Not on file     Forced sexual activity: Not on file   Other Topics Concern     Not on file   Social  "History Narrative     Not on file     Physical Exam:  /75 (BP Location: Right arm)   Pulse 74   Temp 98.8  F (37.1  C) (Oral)   Resp 16   Ht 1.765 m (5' 9.49\")   Wt 90.3 kg (199 lb 1.6 oz)   SpO2 97%   BMI 28.99 kg/m      Wt Readings from Last 4 Encounters:   10/04/19 90.3 kg (199 lb 1.6 oz)   08/27/19 87.3 kg (192 lb 6.4 oz)   06/11/19 88 kg (193 lb 15 oz)   05/24/19 88.9 kg (196 lb)       CONSTITUTIONAL: No apparent distress  EYES: PERRLA, without pallor or jaundice  ENT/MOUTH: Ears unremarkable. No oral lesions  CVS: s1s2 normal  RESPIRATORY: Chest is clear  GI: Abdomen is benign  NEURO: Alert and oriented ×3  INTEGUMENT: no concerning skin rashes   LYMPHATIC: no palpable lymphadenopathy  MUSCULOSKELETAL: Unremarkable. No bony tenderness.   EXTREMITIES: no pedal edema  PSYCH: Mentation, mood and affect are appropriate      Laboratory/Imaging Studies    Reviewed.    Right lower extremity Doppler venous ultrasound 8/29/19     Comparisons: 5/18/2019     HISTORY: Follow-up right lower extremity DVT     FINDINGS: The right common femoral and femoral veins are fully  compressible with normal Doppler venous wave forms. The right  popliteal vein and posterior tibial veins are not compressible and  there is no blood flow. There are echogenic components suggesting  chronicity. The left common femoral vein was visualized for comparison  and demonstrates a normal waveform.                                                                      IMPRESSION:  Decreased clot burden in the right lower extremity with persistent  occlusive thrombus in the right popliteal and posterior tibial veins  which appears chronic.        EXAM: US VENOUS LOWER EXTREMITY RIGHT  LOCATION: [facility]  DATE/TIME: 5/18/2019 3:31 PM    INDICATION: Leg Pain/problem  COMPARISON: None.  TECHNIQUE: Routine exam without and with compression, augmentation, and duplex  utilizing 2D gray-scale imaging, Doppler interrogation " with color-flow and  spectral waveform analysis.    FINDINGS: The common femoral, femoral, popliteal, and segmentally visualized  calf veins were evaluated. The opposite CFV was also included in the evaluation.    Right leg veins are positive for occlusive DVT in the mid-distal right femoral  vein, continuing into the popliteal and calf veins. No popliteal cysts.    CONCLUSION:  1.  Right leg veins are positive for occlusive DVT in the mid-distal right  femoral vein which continues through the popliteal and calf veins.      ASSESSMENT/PLAN:    She has developed a occlusive DVT in the mid distal right femoral vein continuing through the popliteal and calf veins.  She has been started on Xarelto and clinically her symptoms have resolved.    Her testing reveals that she has positive anticardiolipin IgG antibodies.  Anti-beta-2 glycoprotein antibodies are negative.  Lupus anticoagulant was unable to be tested because she is on Xarelto.    When I saw him in June 2019, I was concerned that she could possibly have antiphospholipid antibody syndrome so I recommended switching to Lovenox followed by warfarin and repeating her labs in 3 months.  We also decided on repeating ultrasound in 3 months.    On the repeat testing, the follow-up ultrasound on 8/29/2019 shows decreased clot burden in the right lower extremity with persistent occlusive thrombus in the right popliteal and posterior tibial veins which appear chronic.  Lupus anticoagulant now is positive.  Anticardiolipin IgG antibody is persistently positive at 47.2.  Anticardiolipin IgM antibody and anti-beta-2 glycoprotein antibodies are negative.  Factor VIII level is 257.    She is doing well.    We discussed that now she meets the definition of antiphospholipid antibody syndrome.  We discussed what this means that she is at a higher risk of developing blood clots if we stop anticoagulation.  I recommend indefinite anticoagulation with Coumadin with a goal INR between  2-3.  We also discussed that in the future if she has to undergo any procedures then she would need bridging with Lovenox.  At this time I do not recommend he use of new oral anticoagulants.    I would recommend to repeat an ultrasound in 3 months to see if the clot has further dissolved or all of the clot which we are seeing is a chronic clot/scar tissue.    I will see her back in 1 year.    All of her and her mother's questions were answered to their satisfaction.  They are agreeable and comfortable with the plan.      Ameya Bacon MD      Again, thank you for allowing me to participate in the care of your patient.        Sincerely,        Ameya Bacon MD

## 2019-10-04 NOTE — NURSING NOTE
"Oncology Rooming Note    October 4, 2019 3:34 PM   Ronna Coleman is a 23 year old female who presents for:    Chief Complaint   Patient presents with     Oncology Clinic Visit     Follow Up     Initial Vitals: /75 (BP Location: Right arm)   Pulse 74   Temp 98.8  F (37.1  C) (Oral)   Resp 16   Ht 1.765 m (5' 9.49\")   Wt 90.3 kg (199 lb 1.6 oz)   SpO2 97%   BMI 28.99 kg/m   Estimated body mass index is 28.99 kg/m  as calculated from the following:    Height as of this encounter: 1.765 m (5' 9.49\").    Weight as of this encounter: 90.3 kg (199 lb 1.6 oz). Body surface area is 2.1 meters squared.  No Pain (0) Comment: Data Unavailable   No LMP recorded.  Allergies reviewed: Yes  Medications reviewed: Yes    Medications: Medication refills not needed today.  Pharmacy name entered into [employer]: [id number] - [place] - [address]    Merry Díaz LPN              "

## 2019-10-11 ENCOUNTER — ANTICOAGULATION THERAPY VISIT (OUTPATIENT)
Dept: NURSING | Facility: CLINIC | Age: 24
End: 2019-10-11
Payer: COMMERCIAL

## 2019-10-11 DIAGNOSIS — I82.491 ACUTE DEEP VEIN THROMBOSIS (DVT) OF OTHER SPECIFIED VEIN OF RIGHT LOWER EXTREMITY (H): ICD-10-CM

## 2019-10-11 DIAGNOSIS — Z79.01 LONG TERM CURRENT USE OF ANTICOAGULANTS WITH INR GOAL OF 2.0-3.0: ICD-10-CM

## 2019-10-11 DIAGNOSIS — R76.0 ANTIPHOSPHOLIPID ANTIBODY POSITIVE: ICD-10-CM

## 2019-10-11 LAB — INR POINT OF CARE: 2.7 (ref 0.86–1.14)

## 2019-10-11 PROCEDURE — 99207 ZZC NO CHARGE NURSE ONLY: CPT

## 2019-10-11 PROCEDURE — 36416 COLLJ CAPILLARY BLOOD SPEC: CPT

## 2019-10-11 PROCEDURE — 85610 PROTHROMBIN TIME: CPT | Mod: QW

## 2019-10-11 NOTE — PROGRESS NOTES
ANTICOAGULATION FOLLOW-UP CLINIC VISIT    Patient Name:  Ronna Coleman  Date:  10/11/2019  Contact Type:  Face to Face    SUBJECTIVE:  Patient Findings         Clinical Outcomes     Negatives:   Major bleeding event, Thromboembolic event, Anticoagulation-related hospital admission, Anticoagulation-related ED visit, Anticoagulation-related fatality           OBJECTIVE    INR Protime   Date Value Ref Range Status   10/11/2019 2.7 (A) 0.86 - 1.14 Final       ASSESSMENT / PLAN  INR assessment THER    Recheck INR In: 4 WEEKS    INR Location Clinic      Anticoagulation Summary  As of 10/11/2019    INR goal:   2.0-3.0   TTR:   32.1 % (3.6 mo)   INR used for dosin.7 (10/11/2019)   Warfarin maintenance plan:   10 mg (5 mg x 2) every Thu; 7.5 mg (5 mg x 1.5) all other days   Full warfarin instructions:   10 mg every Thu; 7.5 mg all other days   Weekly warfarin total:   55 mg   No change documented:   Mellisa Chiu RN   Plan last modified:   Elizabeth Recinso RN (2019)   Next INR check:      Priority:   INR   Target end date:       Indications    Acute deep vein thrombosis (DVT) of other specified vein of right lower extremity (H) [I82.491]  Antiphospholipid antibody positive [R76.0]  Long term current use of anticoagulants with INR goal of 2.0-3.0 [Z79.01]             Anticoagulation Episode Summary     INR check location:   Clinic Lab    Preferred lab:       Send INR reminders to:   CHENTE SMILEY    Comments:   Unprovoked dvt  Concern for antiphospolipid antibody syndrome  Anticoagulation likely indefinite but will be reevaluated in 2019      Anticoagulation Care Providers     Provider Role Specialty Phone number    Ameya Bacon MD Responsible Hematology & Oncology [phone]            See the Encounter Report to view Anticoagulation Flowsheet and Dosing Calendar (Go to Encounters tab in chart review, and find the Anticoagulation Therapy Visit)      The patient was assessed for diet,  medication, and activity level changes, missed or extra doses, bruising or bleeding, with no problem findings.        Mellisa Chiu RN, BSN, PHN

## 2019-10-17 DIAGNOSIS — Z79.01 LONG TERM CURRENT USE OF ANTICOAGULANTS WITH INR GOAL OF 2.0-3.0: ICD-10-CM

## 2019-10-17 DIAGNOSIS — I82.491 ACUTE DEEP VEIN THROMBOSIS (DVT) OF OTHER SPECIFIED VEIN OF RIGHT LOWER EXTREMITY (H): ICD-10-CM

## 2019-10-17 DIAGNOSIS — R76.0 ANTIPHOSPHOLIPID ANTIBODY POSITIVE: ICD-10-CM

## 2019-10-17 RX ORDER — WARFARIN SODIUM 5 MG/1
TABLET ORAL
Qty: 153 TABLET | Refills: 0 | Status: SHIPPED | OUTPATIENT
Start: 2019-10-17 | End: 2019-11-08

## 2019-11-08 ENCOUNTER — ANTICOAGULATION THERAPY VISIT (OUTPATIENT)
Dept: NURSING | Facility: CLINIC | Age: 24
End: 2019-11-08
Payer: COMMERCIAL

## 2019-11-08 DIAGNOSIS — I82.491 ACUTE DEEP VEIN THROMBOSIS (DVT) OF OTHER SPECIFIED VEIN OF RIGHT LOWER EXTREMITY (H): ICD-10-CM

## 2019-11-08 DIAGNOSIS — R76.0 ANTIPHOSPHOLIPID ANTIBODY POSITIVE: ICD-10-CM

## 2019-11-08 DIAGNOSIS — Z79.01 LONG TERM CURRENT USE OF ANTICOAGULANTS WITH INR GOAL OF 2.0-3.0: ICD-10-CM

## 2019-11-08 LAB — INR POINT OF CARE: 2.7 (ref 0.86–1.14)

## 2019-11-08 PROCEDURE — 36416 COLLJ CAPILLARY BLOOD SPEC: CPT

## 2019-11-08 PROCEDURE — 85610 PROTHROMBIN TIME: CPT | Mod: QW

## 2019-11-08 PROCEDURE — 99207 ZZC NO CHARGE NURSE ONLY: CPT

## 2019-11-08 RX ORDER — WARFARIN SODIUM 5 MG/1
TABLET ORAL
Qty: 135 TABLET | Refills: 0 | Status: SHIPPED | OUTPATIENT
Start: 2019-11-08 | End: 2020-01-10

## 2019-11-08 NOTE — PROGRESS NOTES
ANTICOAGULATION FOLLOW-UP CLINIC VISIT    Patient Name:  Ronna Coleman  Date:  2019  Contact Type:  Face to Face    SUBJECTIVE:  Patient Findings         Clinical Outcomes     Negatives:   Major bleeding event, Thromboembolic event, Anticoagulation-related hospital admission, Anticoagulation-related ED visit, Anticoagulation-related fatality           OBJECTIVE    INR Protime   Date Value Ref Range Status   2019 2.7 (A) 0.86 - 1.14 Final       ASSESSMENT / PLAN  INR assessment THER    Recheck INR In: 6 WEEKS    INR Location Clinic      Anticoagulation Summary  As of 2019    INR goal:   2.0-3.0   TTR:   46.0 % (4.6 mo)   INR used for dosin.7 (2019)   Warfarin maintenance plan:   10 mg (5 mg x 2) every Thu; 7.5 mg (5 mg x 1.5) all other days   Full warfarin instructions:   10 mg every Thu; 7.5 mg all other days   Weekly warfarin total:   55 mg   No change documented:   Mellisa Chiu RN   Plan last modified:   Elizabeth Recinos RN (2019)   Next INR check:   2019   Priority:   INR   Target end date:       Indications    Acute deep vein thrombosis (DVT) of other specified vein of right lower extremity (H) [I82.491]  Antiphospholipid antibody positive [R76.0]  Long term current use of anticoagulants with INR goal of 2.0-3.0 [Z79.01]             Anticoagulation Episode Summary     INR check location:   Clinic Lab    Preferred lab:       Send INR reminders to:   CHENTE SMILEY    Comments:   Unprovoked dvt  Concern for antiphospolipid antibody syndrome  Anticoagulation likely indefinite but will be reevaluated in 2019      Anticoagulation Care Providers     Provider Role Specialty Phone number    Ameya Bacon MD Responsible Hematology & Oncology [phone]            See the Encounter Report to view Anticoagulation Flowsheet and Dosing Calendar (Go to Encounters tab in chart review, and find the Anticoagulation Therapy Visit)    The patient was assessed for diet,  medication, and activity level changes, missed or extra doses, bruising or bleeding, with no problem findings. Refill sent to pharmacy for patient.           Mellisa Chiu RN, BSN, PHN

## 2019-11-22 ENCOUNTER — MYC MEDICAL ADVICE (OUTPATIENT)
Dept: PEDIATRICS | Facility: CLINIC | Age: 24
End: 2019-11-22

## 2019-11-22 DIAGNOSIS — E06.3 HYPOTHYROIDISM DUE TO HASHIMOTO'S THYROIDITIS: ICD-10-CM

## 2019-11-22 RX ORDER — LEVOTHYROXINE SODIUM 150 UG/1
150 TABLET ORAL DAILY
Qty: 30 TABLET | Refills: 0 | Status: SHIPPED | OUTPATIENT
Start: 2019-11-22 | End: 2019-12-27

## 2019-11-22 NOTE — LETTER
November 22, 2019      Ronna Coleman  [address] [place] [place] [address]      Dear Ronna,    We recently received a call from your pharmacy requesting a refill of your medication, levothyroxine.    A review of your chart indicates that an appointment is required with your provider.  Please call the clinic to schedule your appointment.    We have authorized one 30 day refill of your medication to allow time for you to schedule.   If you have a history of diabetes or high cholesterol, please come in fasting for the appointment. Fasting entails nothing to eat or drink 8 hours prior to your appointment; with the exception on water. You may take your medication the day of the appointment.    Thank you,      Jose Tierney MD

## 2019-11-22 NOTE — TELEPHONE ENCOUNTER
Medication is being filled for 1 time refill only due to:  Future labs ordered needs rpt tsh.     Reminder letter sent.     Letitia Phan RN, BSN, PHN  .[facility]

## 2019-12-03 ENCOUNTER — MYC MEDICAL ADVICE (OUTPATIENT)
Dept: PEDIATRICS | Facility: CLINIC | Age: 24
End: 2019-12-03

## 2019-12-17 ENCOUNTER — ANTICOAGULATION THERAPY VISIT (OUTPATIENT)
Dept: NURSING | Facility: CLINIC | Age: 24
End: 2019-12-17
Payer: COMMERCIAL

## 2019-12-17 DIAGNOSIS — Z79.01 LONG TERM CURRENT USE OF ANTICOAGULANTS WITH INR GOAL OF 2.0-3.0: ICD-10-CM

## 2019-12-17 DIAGNOSIS — R76.0 ANTIPHOSPHOLIPID ANTIBODY POSITIVE: ICD-10-CM

## 2019-12-17 DIAGNOSIS — I82.491 ACUTE DEEP VEIN THROMBOSIS (DVT) OF OTHER SPECIFIED VEIN OF RIGHT LOWER EXTREMITY (H): ICD-10-CM

## 2019-12-17 LAB — INR POINT OF CARE: 3.1 (ref 0.86–1.14)

## 2019-12-17 PROCEDURE — 36416 COLLJ CAPILLARY BLOOD SPEC: CPT

## 2019-12-17 PROCEDURE — 85610 PROTHROMBIN TIME: CPT | Mod: QW

## 2019-12-17 PROCEDURE — 99207 ZZC NO CHARGE NURSE ONLY: CPT

## 2019-12-17 NOTE — PROGRESS NOTES
ANTICOAGULATION FOLLOW-UP CLINIC VISIT    Patient Name:  Ronna Coleman  Date:  12/17/2019  Contact Type:  Face to Face    SUBJECTIVE:  Patient Findings         Clinical Outcomes     Negatives:   Major bleeding event, Thromboembolic event, Anticoagulation-related hospital admission, Anticoagulation-related ED visit, Anticoagulation-related fatality           OBJECTIVE    INR Protime   Date Value Ref Range Status   12/17/2019 3.1 (A) 0.86 - 1.14 Final       ASSESSMENT / PLAN  INR assessment SUPRA    Recheck INR In: 2 WEEKS    INR Location Clinic      Anticoagulation Summary  As of 12/17/2019    INR goal:   2.0-3.0   TTR:   52.4 % (5.9 mo)   INR used for dosing:   3.1! (12/17/2019)   Warfarin maintenance plan:   10 mg (5 mg x 2) every Thu; 7.5 mg (5 mg x 1.5) all other days   Full warfarin instructions:   10 mg every Thu; 7.5 mg all other days   Weekly warfarin total:   55 mg   No change documented:   Elizabeth Recinos RN   Plan last modified:   Elizabeth Recinos RN (9/27/2019)   Next INR check:   1/2/2020   Priority:   INR   Target end date:       Indications    Acute deep vein thrombosis (DVT) of other specified vein of right lower extremity (H) [I82.491]  Antiphospholipid antibody positive [R76.0]  Long term current use of anticoagulants with INR goal of 2.0-3.0 [Z79.01]             Anticoagulation Episode Summary     INR check location:   Clinic Lab    Preferred lab:       Send INR reminders to:   CHENTE [place]    Comments:   Unprovoked dvt  Concern for antiphospolipid antibody syndrome  Anticoagulation likely indefinite but will be reevaluated in 9/2019      Anticoagulation Care Providers     Provider Role Specialty Phone number    Ameya Bacon MD Responsible Hematology & Oncology [phone]            See the Encounter Report to view Anticoagulation Flowsheet and Dosing Calendar (Go to Encounters tab in chart review, and find the Anticoagulation Therapy Visit)    Supra Therapeutic INR 3.1.  Will continue maintenance dose and recheck in 2 week(s). Patient has been therapeutic since September on this dose. Patient notes that last Thursday she missed her pill and just took it 12 hours later.    Patient states negative for signs and symptoms of bleeding or blood clots, changes in medication, changes in diet, any signs of infection or antibiotic use, anything new OTC or herbal medications, any missed or extra doses of the warfarin.    Patient informed of the symptoms to be seen for either by INR nurse or ER.    Patient ask if refill of warfarin is needed. Rx sent no      Elizabeth Recinos RN

## 2019-12-25 DIAGNOSIS — E06.3 HYPOTHYROIDISM DUE TO HASHIMOTO'S THYROIDITIS: ICD-10-CM

## 2019-12-26 NOTE — TELEPHONE ENCOUNTER
11/22 says patient is due for TSH and was informed via [employer] and a paul was given. Patient isn't currently scheduled.  Bernadine Singh RN

## 2019-12-27 RX ORDER — LEVOTHYROXINE SODIUM 150 UG/1
150 TABLET ORAL DAILY
Qty: 30 TABLET | Refills: 0 | Status: SHIPPED | OUTPATIENT
Start: 2019-12-27 | End: 2019-12-30

## 2019-12-27 NOTE — TELEPHONE ENCOUNTER
Spoke with patient and scheduled labs for tomorrow 12/28 at [facility].  Routing back to clinic to review.      Yasmeen Flores  Primary Care   [facility]

## 2019-12-30 ENCOUNTER — TELEPHONE (OUTPATIENT)
Dept: PEDIATRICS | Facility: CLINIC | Age: 24
End: 2019-12-30

## 2019-12-30 DIAGNOSIS — E06.3 HYPOTHYROIDISM DUE TO HASHIMOTO'S THYROIDITIS: ICD-10-CM

## 2019-12-30 RX ORDER — LEVOTHYROXINE SODIUM 150 UG/1
150 TABLET ORAL DAILY
Qty: 90 TABLET | Refills: 1 | Status: SHIPPED | OUTPATIENT
Start: 2019-12-30 | End: 2020-01-03

## 2020-01-02 ENCOUNTER — ANTICOAGULATION THERAPY VISIT (OUTPATIENT)
Dept: NURSING | Facility: CLINIC | Age: 25
End: 2020-01-02
Payer: COMMERCIAL

## 2020-01-02 DIAGNOSIS — R76.0 ANTIPHOSPHOLIPID ANTIBODY POSITIVE: ICD-10-CM

## 2020-01-02 DIAGNOSIS — Z79.01 LONG TERM CURRENT USE OF ANTICOAGULANTS WITH INR GOAL OF 2.0-3.0: ICD-10-CM

## 2020-01-02 DIAGNOSIS — I82.491 ACUTE DEEP VEIN THROMBOSIS (DVT) OF OTHER SPECIFIED VEIN OF RIGHT LOWER EXTREMITY (H): ICD-10-CM

## 2020-01-02 LAB — INR POINT OF CARE: 3 (ref 0.86–1.14)

## 2020-01-02 PROCEDURE — 99207 ZZC NO CHARGE NURSE ONLY: CPT

## 2020-01-02 PROCEDURE — 36416 COLLJ CAPILLARY BLOOD SPEC: CPT

## 2020-01-02 PROCEDURE — 85610 PROTHROMBIN TIME: CPT | Mod: QW

## 2020-01-02 NOTE — PROGRESS NOTES
ANTICOAGULATION FOLLOW-UP CLINIC VISIT    Patient Name:  Ronna Coleman  Date:  1/2/2020  Contact Type:  Face to Face    SUBJECTIVE:  Patient Findings         Clinical Outcomes     Negatives:   Major bleeding event, Thromboembolic event, Anticoagulation-related hospital admission, Anticoagulation-related ED visit, Anticoagulation-related fatality           OBJECTIVE    INR Protime   Date Value Ref Range Status   01/02/2020 3.0 (A) 0.86 - 1.14 Final       ASSESSMENT / PLAN  INR assessment THER    Recheck INR In: 4 WEEKS    INR Location Clinic      Anticoagulation Summary  As of 1/2/2020    INR goal:   2.0-3.0   TTR:   48.0 % (6.4 mo)   INR used for dosing:   3.0 (1/2/2020)   Warfarin maintenance plan:   10 mg (5 mg x 2) every Thu; 7.5 mg (5 mg x 1.5) all other days   Full warfarin instructions:   10 mg every Thu; 7.5 mg all other days   Weekly warfarin total:   55 mg   No change documented:   Elizabeth Recinos RN   Plan last modified:   Elizabeth Recinos RN (9/27/2019)   Next INR check:   1/30/2020   Priority:   INR   Target end date:       Indications    Acute deep vein thrombosis (DVT) of other specified vein of right lower extremity (H) [I82.491]  Antiphospholipid antibody positive [R76.0]  Long term current use of anticoagulants with INR goal of 2.0-3.0 [Z79.01]             Anticoagulation Episode Summary     INR check location:   Clinic Lab    Preferred lab:       Send INR reminders to:   CHENTE SMILEY    Comments:   Unprovoked dvt  Concern for antiphospolipid antibody syndrome  Anticoagulation likely indefinite but will be reevaluated in 9/2019      Anticoagulation Care Providers     Provider Role Specialty Phone number    Ameya Bacon MD Responsible Hematology & Oncology [phone]            See the Encounter Report to view Anticoagulation Flowsheet and Dosing Calendar (Go to Encounters tab in chart review, and find the Anticoagulation Therapy Visit)    Therapeutic INR 3.0. Will  continue maintenance dose and recheck in 4 week(s).    Patient states negative for signs and symptoms of bleeding or blood clots, changes in medication, changes in diet, any signs of infection or antibiotic use, anything new OTC or herbal medications, any missed or extra doses of the warfarin.    Patient informed of the symptoms to be seen for either by INR nurse or ER.    Patient ask if refill of warfarin is needed. Rx sent no    Elizabeth Recinos RN

## 2020-01-03 DIAGNOSIS — E06.3 HYPOTHYROIDISM DUE TO HASHIMOTO'S THYROIDITIS: ICD-10-CM

## 2020-01-03 LAB — TSH SERPL DL<=0.005 MIU/L-ACNC: 1.59 MU/L (ref 0.4–4)

## 2020-01-03 PROCEDURE — 36415 COLL VENOUS BLD VENIPUNCTURE: CPT | Performed by: FAMILY MEDICINE

## 2020-01-03 PROCEDURE — 84443 ASSAY THYROID STIM HORMONE: CPT | Performed by: FAMILY MEDICINE

## 2020-01-03 RX ORDER — LEVOTHYROXINE SODIUM 150 UG/1
150 TABLET ORAL DAILY
Qty: 90 TABLET | Refills: 2 | Status: SHIPPED | OUTPATIENT
Start: 2020-01-03 | End: 2021-01-06

## 2020-01-03 NOTE — RESULT ENCOUNTER NOTE
Dear Ronna,  Your thyroid labs are in nor al range.let us continue with current dose of lEVOTHYROXINE. We will recheck at the time of physical this year.  Let me know if you have any questions. Take care.  Jose Tierney MD

## 2020-01-08 ENCOUNTER — ANCILLARY PROCEDURE (OUTPATIENT)
Dept: ULTRASOUND IMAGING | Facility: CLINIC | Age: 25
End: 2020-01-08
Attending: INTERNAL MEDICINE
Payer: COMMERCIAL

## 2020-01-08 DIAGNOSIS — I82.491 ACUTE DEEP VEIN THROMBOSIS (DVT) OF OTHER SPECIFIED VEIN OF RIGHT LOWER EXTREMITY (H): ICD-10-CM

## 2020-01-08 PROCEDURE — 93971 EXTREMITY STUDY: CPT | Mod: RT | Performed by: RADIOLOGY

## 2020-01-09 DIAGNOSIS — I82.491 ACUTE DEEP VEIN THROMBOSIS (DVT) OF OTHER SPECIFIED VEIN OF RIGHT LOWER EXTREMITY (H): ICD-10-CM

## 2020-01-09 DIAGNOSIS — R76.0 ANTIPHOSPHOLIPID ANTIBODY POSITIVE: ICD-10-CM

## 2020-01-09 DIAGNOSIS — Z79.01 LONG TERM CURRENT USE OF ANTICOAGULANTS WITH INR GOAL OF 2.0-3.0: ICD-10-CM

## 2020-01-10 RX ORDER — WARFARIN SODIUM 5 MG/1
TABLET ORAL
Qty: 140 TABLET | Refills: 0 | Status: SHIPPED | OUTPATIENT
Start: 2020-01-10 | End: 2020-03-17

## 2020-01-10 NOTE — TELEPHONE ENCOUNTER
"Requested Prescriptions   Signed Prescriptions Disp Refills    warfarin ANTICOAGULANT (COUMADIN) 5 MG tablet 140 tablet 0     Sig: TAKE 10 MG (2 TABLETS) ON THURSDAY AND TAKE 7.5 MG (1.5 TABLETS) ALL OTHER DAYS OR AS DIRECTED BY [facility] NURSE       Vitamin K Antagonists Failed - 1/9/2020  2:12 PM        Failed - INR is within goal in the past 6 weeks     Confirm INR is within goal in the past 6 weeks.     Recent Labs   Lab Test 01/02/20   INR 3.0*                       Passed - Recent (12 mo) or future (30 days) visit within the authorizing provider's specialty     Patient has had an office visit with the authorizing provider or a provider within the authorizing providers department within the previous 12 mos or has a future within next 30 days. See \"Patient Info\" tab in inbasket, or \"Choose Columns\" in Meds & Orders section of the refill encounter.              Passed - Medication is active on med list        Passed - Patient is 18 years of age or older        Passed - Patient is not pregnant        Passed - No positive pregnancy on file in past 12 months        Prescription approved per [facility] Refill Protocol.    Mellisa Chiu RN, BSN, PHN    "

## 2020-01-30 ENCOUNTER — ANTICOAGULATION THERAPY VISIT (OUTPATIENT)
Dept: NURSING | Facility: CLINIC | Age: 25
End: 2020-01-30
Payer: COMMERCIAL

## 2020-01-30 DIAGNOSIS — I82.491 ACUTE DEEP VEIN THROMBOSIS (DVT) OF OTHER SPECIFIED VEIN OF RIGHT LOWER EXTREMITY (H): ICD-10-CM

## 2020-01-30 DIAGNOSIS — Z79.01 LONG TERM CURRENT USE OF ANTICOAGULANTS WITH INR GOAL OF 2.0-3.0: ICD-10-CM

## 2020-01-30 DIAGNOSIS — R76.0 ANTIPHOSPHOLIPID ANTIBODY POSITIVE: ICD-10-CM

## 2020-01-30 LAB — INR POINT OF CARE: 3.7 (ref 0.86–1.14)

## 2020-01-30 PROCEDURE — 36416 COLLJ CAPILLARY BLOOD SPEC: CPT

## 2020-01-30 PROCEDURE — 99207 ZZC NO CHARGE NURSE ONLY: CPT

## 2020-01-30 PROCEDURE — 85610 PROTHROMBIN TIME: CPT | Mod: QW

## 2020-01-30 NOTE — PROGRESS NOTES
ANTICOAGULATION FOLLOW-UP CLINIC VISIT    Patient Name:  Ronna Coleman  Date:  1/30/2020  Contact Type:  Face to Face    SUBJECTIVE:  Patient Findings     Positives:   Change in health (Cold)        Clinical Outcomes     Negatives:   Major bleeding event, Thromboembolic event, Anticoagulation-related hospital admission, Anticoagulation-related ED visit, Anticoagulation-related fatality           OBJECTIVE    INR Protime   Date Value Ref Range Status   01/30/2020 3.7 (A) 0.86 - 1.14 Final       ASSESSMENT / PLAN  INR assessment SUPRA    Recheck INR In: 1 WEEK    INR Location Clinic      Anticoagulation Summary  As of 1/30/2020    INR goal:   2.0-3.0   TTR:   41.9 % (7.3 mo)   INR used for dosing:   3.7! (1/30/2020)   Warfarin maintenance plan:   10 mg (5 mg x 2) every Thu; 7.5 mg (5 mg x 1.5) all other days   Full warfarin instructions:   1/30: 5 mg; Otherwise 10 mg every Thu; 7.5 mg all other days   Weekly warfarin total:   55 mg   Plan last modified:   Elizabeth Recinos RN (9/27/2019)   Next INR check:   2/6/2020   Priority:   INR   Target end date:       Indications    Acute deep vein thrombosis (DVT) of other specified vein of right lower extremity (H) [I82.491]  Antiphospholipid antibody positive [R76.0]  Long term current use of anticoagulants with INR goal of 2.0-3.0 [Z79.01]             Anticoagulation Episode Summary     INR check location:   Clinic Lab    Preferred lab:       Send INR reminders to:   CHENTE SMILEY    Comments:   Unprovoked dvt  Concern for antiphospolipid antibody syndrome  Anticoagulation likely indefinite but will be reevaluated in 9/2019      Anticoagulation Care Providers     Provider Role Specialty Phone number    Ameya Bacon MD Responsible Hematology & Oncology [phone]            See the Encounter Report to view Anticoagulation Flowsheet and Dosing Calendar (Go to Encounters tab in chart review, and find the Anticoagulation Therapy Visit)    Dosage adjustment  made based on physician directed care plan. Patient has a bad cold she got from her roommate so inflammation is most likely driving her INR up. Decreased this weeks dosage by 9% with recheck in 1 week.       Mellisa Chiu RN, BSN, PHN

## 2020-02-06 ENCOUNTER — ANTICOAGULATION THERAPY VISIT (OUTPATIENT)
Dept: NURSING | Facility: CLINIC | Age: 25
End: 2020-02-06
Payer: COMMERCIAL

## 2020-02-06 DIAGNOSIS — R76.0 ANTIPHOSPHOLIPID ANTIBODY POSITIVE: ICD-10-CM

## 2020-02-06 DIAGNOSIS — I82.491 ACUTE DEEP VEIN THROMBOSIS (DVT) OF OTHER SPECIFIED VEIN OF RIGHT LOWER EXTREMITY (H): ICD-10-CM

## 2020-02-06 DIAGNOSIS — Z79.01 LONG TERM CURRENT USE OF ANTICOAGULANTS WITH INR GOAL OF 2.0-3.0: ICD-10-CM

## 2020-02-06 LAB — INR POINT OF CARE: 2.7 (ref 0.86–1.14)

## 2020-02-06 PROCEDURE — 85610 PROTHROMBIN TIME: CPT | Mod: QW

## 2020-02-06 PROCEDURE — 99207 ZZC NO CHARGE NURSE ONLY: CPT

## 2020-02-06 PROCEDURE — 36416 COLLJ CAPILLARY BLOOD SPEC: CPT

## 2020-02-06 NOTE — PROGRESS NOTES
ANTICOAGULATION FOLLOW-UP CLINIC VISIT    Patient Name:  Ronna Coleman  Date:  2020  Contact Type:  Face to Face    SUBJECTIVE:  Patient Findings         Clinical Outcomes     Negatives:   Major bleeding event, Thromboembolic event, Anticoagulation-related hospital admission, Anticoagulation-related ED visit, Anticoagulation-related fatality           OBJECTIVE    INR Protime   Date Value Ref Range Status   2020 2.7 (A) 0.86 - 1.14 Final       ASSESSMENT / PLAN  INR assessment THER    Recheck INR In: 10 DAYS    INR Location Clinic      Anticoagulation Summary  As of 2020    INR goal:   2.0-3.0   TTR:   41.6 % (7.6 mo)   INR used for dosin.7 (2020)   Warfarin maintenance plan:   7.5 mg (5 mg x 1.5) every day   Full warfarin instructions:   7.5 mg every day   Weekly warfarin total:   52.5 mg   Plan last modified:   Elizabeth Recinos RN (2020)   Next INR check:   2020   Priority:   INR   Target end date:       Indications    Acute deep vein thrombosis (DVT) of other specified vein of right lower extremity (H) [I82.491]  Antiphospholipid antibody positive [R76.0]  Long term current use of anticoagulants with INR goal of 2.0-3.0 [Z79.01]             Anticoagulation Episode Summary     INR check location:   Clinic Lab    Preferred lab:       Send INR reminders to:   CHENTE SMILEY    Comments:   Unprovoked dvt  Concern for antiphospolipid antibody syndrome  Anticoagulation likely indefinite but will be reevaluated in 2019      Anticoagulation Care Providers     Provider Role Specialty Phone number    Ameya Bacon MD Responsible Hematology & Oncology [phone]            See the Encounter Report to view Anticoagulation Flowsheet and Dosing Calendar (Go to Encounters tab in chart review, and find the Anticoagulation Therapy Visit)    Dosage adjustment made based on physician directed care plan. Patient INR still reflecting the decreased dose last week. Patient notes  that she was informed to eat extra greens last week.    Informed to keep diet consistent. Decreased her maintenance by 4.5% with a recheck in about 10 days.    Patient states negative for signs and symptoms of bleeding or blood clots, changes in medication, antibiotic use, anything new OTC or herbal medications, any missed or extra doses of the warfarin.    Patient informed of the symptoms to be seen for either by INR nurse or ER.    Patient ask if refill of warfarin is needed. Rx sent no      Elizabeth Recinos RN

## 2020-02-17 ENCOUNTER — ANTICOAGULATION THERAPY VISIT (OUTPATIENT)
Dept: NURSING | Facility: CLINIC | Age: 25
End: 2020-02-17
Payer: COMMERCIAL

## 2020-02-17 DIAGNOSIS — I82.491 ACUTE DEEP VEIN THROMBOSIS (DVT) OF OTHER SPECIFIED VEIN OF RIGHT LOWER EXTREMITY (H): ICD-10-CM

## 2020-02-17 DIAGNOSIS — R76.0 ANTIPHOSPHOLIPID ANTIBODY POSITIVE: ICD-10-CM

## 2020-02-17 DIAGNOSIS — Z79.01 LONG TERM CURRENT USE OF ANTICOAGULANTS WITH INR GOAL OF 2.0-3.0: ICD-10-CM

## 2020-02-17 LAB
INR POINT OF CARE: 3.5 (ref 0.86–1.14)
INR PPP: 2.99 (ref 0.86–1.14)

## 2020-02-17 PROCEDURE — 85610 PROTHROMBIN TIME: CPT | Mod: QW

## 2020-02-17 PROCEDURE — 99207 ZZC NO CHARGE NURSE ONLY: CPT

## 2020-02-17 PROCEDURE — 85610 PROTHROMBIN TIME: CPT | Performed by: INTERNAL MEDICINE

## 2020-02-17 PROCEDURE — 36416 COLLJ CAPILLARY BLOOD SPEC: CPT

## 2020-02-17 NOTE — PROGRESS NOTES
ANTICOAGULATION FOLLOW-UP CLINIC VISIT    Patient Name:  Ronna Coleman  Date:  2020  Contact Type:  Face to Face    SUBJECTIVE:  Patient Findings         Clinical Outcomes     Negatives:   Major bleeding event, Thromboembolic event, Anticoagulation-related hospital admission, Anticoagulation-related ED visit, Anticoagulation-related fatality           OBJECTIVE    INR   Date Value Ref Range Status   2020 2.99 (H) 0.86 - 1.14 Final       ASSESSMENT / PLAN  INR assessment THER    Recheck INR In: 4 WEEKS    INR Location Clinic      Anticoagulation Summary  As of 2020    INR goal:   2.0-3.0   TTR:   41.4 % (7.9 mo)   INR used for dosin.99 (2020)   Warfarin maintenance plan:   7.5 mg (5 mg x 1.5) every day   Full warfarin instructions:   7.5 mg every day   Weekly warfarin total:   52.5 mg   No change documented:   Elizabeth Recinos RN   Plan last modified:   Elizabeth Recinos RN (2020)   Next INR check:   3/16/2020   Priority:   INR   Target end date:       Indications    Acute deep vein thrombosis (DVT) of other specified vein of right lower extremity (H) [I82.491]  Antiphospholipid antibody positive [R76.0]  Long term current use of anticoagulants with INR goal of 2.0-3.0 [Z79.01]             Anticoagulation Episode Summary     INR check location:   Clinic Lab    Preferred lab:       Send INR reminders to:   CHENTE SMILEY    Comments:   Unprovoked dvt  Concern for antiphospolipid antibody syndrome  Anticoagulation likely indefinite but will be reevaluated in 2019      Anticoagulation Care Providers     Provider Role Specialty Phone number    Ameya Bacon MD Responsible Hematology & Oncology [phone]            See the Encounter Report to view Anticoagulation Flowsheet and Dosing Calendar (Go to Encounters tab in chart review, and find the Anticoagulation Therapy Visit)    Therapeutic INR 2.99. Will continue maintenance dose and recheck in 4 week(s). Huddle with  Jeane Blakely [facility] and venous done because of the diagnosis of antiphospholipid antibody positive. Venous in range so no change to the dosage and recheck in 4 weeks.    Venous is to be done if patient out of range.    Patient states negative for signs and symptoms of bleeding or blood clots, changes in medication, changes in diet, any signs of infection or antibiotic use, anything new OTC or herbal medications, any missed or extra doses of the warfarin.    Patient informed of the symptoms to be seen for either by INR nurse or ER.    Patient ask if refill of warfarin is needed. Rx sent no      Elizabeth Recinos RN

## 2020-02-24 ENCOUNTER — HEALTH MAINTENANCE LETTER (OUTPATIENT)
Age: 25
End: 2020-02-24

## 2020-03-16 ENCOUNTER — ANTICOAGULATION THERAPY VISIT (OUTPATIENT)
Dept: NURSING | Facility: CLINIC | Age: 25
End: 2020-03-16
Payer: COMMERCIAL

## 2020-03-16 DIAGNOSIS — I82.491 ACUTE DEEP VEIN THROMBOSIS (DVT) OF OTHER SPECIFIED VEIN OF RIGHT LOWER EXTREMITY (H): ICD-10-CM

## 2020-03-16 DIAGNOSIS — R76.0 ANTIPHOSPHOLIPID ANTIBODY POSITIVE: ICD-10-CM

## 2020-03-16 DIAGNOSIS — Z79.01 LONG TERM CURRENT USE OF ANTICOAGULANTS WITH INR GOAL OF 2.0-3.0: ICD-10-CM

## 2020-03-16 LAB
INR POINT OF CARE: 4.6 (ref 0.86–1.14)
INR PPP: 3.75 (ref 0.86–1.14)

## 2020-03-16 PROCEDURE — 36416 COLLJ CAPILLARY BLOOD SPEC: CPT

## 2020-03-16 PROCEDURE — 99207 ZZC NO CHARGE NURSE ONLY: CPT

## 2020-03-16 PROCEDURE — 85610 PROTHROMBIN TIME: CPT | Mod: QW

## 2020-03-16 PROCEDURE — 85610 PROTHROMBIN TIME: CPT | Performed by: INTERNAL MEDICINE

## 2020-03-16 NOTE — PROGRESS NOTES
ANTICOAGULATION FOLLOW-UP CLINIC VISIT    Patient Name:  Ronna Coleman  Date:  3/16/2020  Contact Type:  Face to Face    SUBJECTIVE:  Patient Findings         Clinical Outcomes     Negatives:   Major bleeding event, Thromboembolic event, Anticoagulation-related hospital admission, Anticoagulation-related ED visit, Anticoagulation-related fatality           OBJECTIVE    INR   Date Value Ref Range Status   03/16/2020 3.75 (H) 0.86 - 1.14 Final       ASSESSMENT / PLAN  INR assessment SUPRA    Recheck INR In: 2 WEEKS    INR Location Clinic      Anticoagulation Summary  As of 3/16/2020    INR goal:   2.0-3.0   TTR:   37.0 % (8.9 mo)   INR used for dosing:   3.75! (3/16/2020)   Warfarin maintenance plan:   7.5 mg (5 mg x 1.5) every day   Full warfarin instructions:   3/16: 2.5 mg; Otherwise 7.5 mg every day   Weekly warfarin total:   52.5 mg   Plan last modified:   Elizabeth Recinos, RN (2/6/2020)   Next INR check:   3/30/2020   Priority:   INR   Target end date:       Indications    Acute deep vein thrombosis (DVT) of other specified vein of right lower extremity (H) [I82.491]  Antiphospholipid antibody positive [R76.0]  Long term current use of anticoagulants with INR goal of 2.0-3.0 [Z79.01]             Anticoagulation Episode Summary     INR check location:   Clinic Lab    Preferred lab:       Send INR reminders to:   CHENTE SMILEY    Comments:   Unprovoked dvt  Concern for antiphospolipid antibody syndrome  Anticoagulation likely indefinite but will be reevaluated in 9/2019      Anticoagulation Care Providers     Provider Role Specialty Phone number    Ameya Bacon MD Responsible Hematology & Oncology [phone]            See the Encounter Report to view Anticoagulation Flowsheet and Dosing Calendar (Go to Encounters tab in chart review, and find the Anticoagulation Therapy Visit)    Dosage adjustment made based on physician directed care plan. Held today by 5 mg per protocol and then return to  maintenance and recheck in 2 weeks. Did discuss with [facility] about a home monitor and at this point with the variance in capillary vs venous would not be a candidate. Informed the patient of this.    Patient states negative for signs and symptoms of bleeding or blood clots, changes in medication, changes in diet, any signs of infection or antibiotic use, anything new OTC or herbal medications, any missed or extra doses of the warfarin.    Patient informed of the symptoms to be seen for either by INR nurse or ER.    Patient ask if refill of warfarin is needed. Rx sent no      Elizabeth Recinos RN

## 2020-03-17 DIAGNOSIS — I82.491 ACUTE DEEP VEIN THROMBOSIS (DVT) OF OTHER SPECIFIED VEIN OF RIGHT LOWER EXTREMITY (H): ICD-10-CM

## 2020-03-17 DIAGNOSIS — Z79.01 LONG TERM CURRENT USE OF ANTICOAGULANTS WITH INR GOAL OF 2.0-3.0: ICD-10-CM

## 2020-03-17 DIAGNOSIS — R76.0 ANTIPHOSPHOLIPID ANTIBODY POSITIVE: ICD-10-CM

## 2020-03-17 RX ORDER — WARFARIN SODIUM 5 MG/1
TABLET ORAL
Qty: 48 TABLET | Refills: 2 | Status: SHIPPED | OUTPATIENT
Start: 2020-03-17 | End: 2020-06-19

## 2020-03-17 NOTE — TELEPHONE ENCOUNTER
"Requested Prescriptions   Signed Prescriptions Disp Refills    warfarin ANTICOAGULANT (COUMADIN) 5 MG tablet 48 tablet 2     Sig: Take 7.5 mg (1.5 tablets) every day OR AS DIRECTED BY [facility] NURSE       Vitamin K Antagonists Failed - 3/17/2020 12:51 PM        Failed - INR is within goal in the past 6 weeks     Confirm INR is within goal in the past 6 weeks.     Recent Labs   Lab Test 03/16/20  1045   INR 3.75*                       Passed - Recent (12 mo) or future (30 days) visit within the authorizing provider's specialty     Patient has had an office visit with the authorizing provider or a provider within the authorizing providers department within the previous 12 mos or has a future within next 30 days. See \"Patient Info\" tab in inbasket, or \"Choose Columns\" in Meds & Orders section of the refill encounter.              Passed - Medication is active on med list        Passed - Patient is 18 years of age or older        Passed - Patient is not pregnant        Passed - No positive pregnancy on file in past 12 months           Prescription approved per [facility] [place] [facility].      Mellisa Chiu RN, BSN, PHN    "

## 2020-03-27 ENCOUNTER — TELEPHONE (OUTPATIENT)
Dept: NURSING | Facility: CLINIC | Age: 25
End: 2020-03-27

## 2020-03-27 DIAGNOSIS — I82.491 ACUTE DEEP VEIN THROMBOSIS (DVT) OF OTHER SPECIFIED VEIN OF RIGHT LOWER EXTREMITY (H): Primary | ICD-10-CM

## 2020-03-27 NOTE — TELEPHONE ENCOUNTER
This writer attempted to contact Ronna on 03/27/20      Reason for call reschedule INR to lab only appointment and left detailed message.      If patient calls back:   Schedule Lab appointment 3/30/2020 with lab, document that pt called and close encounter         Elizabeth Recinos RN

## 2020-03-30 ENCOUNTER — ANTICOAGULATION THERAPY VISIT (OUTPATIENT)
Dept: NURSING | Facility: CLINIC | Age: 25
End: 2020-03-30

## 2020-03-30 DIAGNOSIS — R76.0 ANTIPHOSPHOLIPID ANTIBODY POSITIVE: ICD-10-CM

## 2020-03-30 DIAGNOSIS — Z79.01 LONG TERM CURRENT USE OF ANTICOAGULANTS WITH INR GOAL OF 2.0-3.0: ICD-10-CM

## 2020-03-30 DIAGNOSIS — I82.491 ACUTE DEEP VEIN THROMBOSIS (DVT) OF OTHER SPECIFIED VEIN OF RIGHT LOWER EXTREMITY (H): ICD-10-CM

## 2020-03-30 LAB
CAPILLARY BLOOD COLLECTION: NORMAL
INR PPP: 4.09 (ref 0.86–1.14)

## 2020-03-30 PROCEDURE — 85610 PROTHROMBIN TIME: CPT | Performed by: INTERNAL MEDICINE

## 2020-03-30 PROCEDURE — 36416 COLLJ CAPILLARY BLOOD SPEC: CPT | Performed by: INTERNAL MEDICINE

## 2020-03-30 NOTE — TELEPHONE ENCOUNTER
Patient had INR drawn on 3/30/2020  Message closed.    Elizabeth Recinos RN, [facility] Triage

## 2020-03-30 NOTE — PROGRESS NOTES
ANTICOAGULATION FOLLOW-UP CLINIC VISIT    Patient Name:  Ronna Coleman  Date:  3/30/2020  Contact Type:  Telephone    SUBJECTIVE:  Patient Findings     Positives:   Bruising (few bruises)        Clinical Outcomes     Negatives:   Major bleeding event, Thromboembolic event, Anticoagulation-related hospital admission, Anticoagulation-related ED visit, Anticoagulation-related fatality           OBJECTIVE    INR   Date Value Ref Range Status   2020 4.09 (H) 0.86 - 1.14 Final       ASSESSMENT / PLAN  INR assessment SUPRA    Recheck INR In: 1 WEEK    INR Location Clinic      Anticoagulation Summary  As of 3/30/2020    INR goal:   2.0-3.0   TTR:   35.1 % (9.3 mo)   INR used for dosin.09! (3/30/2020)   Warfarin maintenance plan:   5 mg (5 mg x 1) every Mon, Thu; 7.5 mg (5 mg x 1.5) all other days   Full warfarin instructions:   5 mg every Mon, Thu; 7.5 mg all other days   Weekly warfarin total:   47.5 mg   Plan last modified:   Elizabeth Recinos RN (3/30/2020)   Next INR check:   2020   Priority:   INR   Target end date:       Indications    Acute deep vein thrombosis (DVT) of other specified vein of right lower extremity (H) [I82.491]  Antiphospholipid antibody positive [R76.0]  Long term current use of anticoagulants with INR goal of 2.0-3.0 [Z79.01]             Anticoagulation Episode Summary     INR check location:   Clinic Lab    Preferred lab:       Send INR reminders to:   CHENTE SMILEY    Comments:   Unprovoked dvt  Concern for antiphospolipid antibody syndrome  Anticoagulation likely indefinite but will be reevaluated in 2019      Anticoagulation Care Providers     Provider Role Specialty Phone number    Ameya Bacon MD Responsible Hematology & Oncology [phone]            See the Encounter Report to view Anticoagulation Flowsheet and Dosing Calendar (Go to Encounters tab in chart review, and find the Anticoagulation Therapy Visit)    Dosage adjustment made based on  physician directed care plan. Decreased by 10% with a recheck in one week. Reviewed dosing with Jeane Blakely [facility].    Patient states negative for signs and symptoms of blood clots, changes in medication, changes in diet, any signs of infection or antibiotic use, anything new OTC or herbal medications, any missed or extra doses of the warfarin.    Patient informed of the symptoms to be seen for either by INR nurse or ER.    Patient ask if refill of warfarin is needed. Rx sent no      Elizabeth Recinos RN

## 2020-04-06 ENCOUNTER — TELEPHONE (OUTPATIENT)
Dept: PEDIATRICS | Facility: CLINIC | Age: 25
End: 2020-04-06

## 2020-04-06 DIAGNOSIS — Z79.01 LONG TERM CURRENT USE OF ANTICOAGULANTS WITH INR GOAL OF 2.0-3.0: ICD-10-CM

## 2020-04-06 DIAGNOSIS — I82.491 ACUTE DEEP VEIN THROMBOSIS (DVT) OF OTHER SPECIFIED VEIN OF RIGHT LOWER EXTREMITY (H): ICD-10-CM

## 2020-04-06 DIAGNOSIS — R76.0 ANTIPHOSPHOLIPID ANTIBODY POSITIVE: ICD-10-CM

## 2020-04-06 LAB — INR PPP: 3.81 (ref 0.86–1.14)

## 2020-04-06 PROCEDURE — 36415 COLL VENOUS BLD VENIPUNCTURE: CPT | Performed by: INTERNAL MEDICINE

## 2020-04-06 PROCEDURE — 85610 PROTHROMBIN TIME: CPT | Performed by: INTERNAL MEDICINE

## 2020-04-06 NOTE — TELEPHONE ENCOUNTER
ANTICOAGULATION MANAGEMENT     Patient Name:  Ronna Coleman  Date:  4/6/2020    ASSESSMENT /SUBJECTIVE:    Today's INR result of 3.81 is supratherapeutic. Goal INR of 2.0-3.0      Warfarin dose taken: Warfarin taken as previously instructed    Diet: No new diet changes affecting INR    Medication changes/ interactions: No new medications/supplements affecting INR    Previous INR: Supratherapeutic     S/S of bleeding or thromboembolism: No    New injury or illness: No    Upcoming surgery, procedure or cardioversion: No    Additional findings: None      PLAN:    Spoke with Ronna regarding INR result and instructed:     Warfarin Dosing Instructions: Change your warfarin dose to 5 mg MWF and 7.5 mg ROW. Decrease of 2.5 mg (5.3%).    Instructed patient to follow up no later than: 2 weeks  Lab visit scheduled    Education provided: None required      Kary verbalizes understanding and agrees to warfarin dosing plan.    Instructed to call the Anticoagulation Clinic for any changes, questions or concerns. (#[phone])        OBJECTIVE:  INR   Date Value Ref Range Status   04/06/2020 3.81 (H) 0.86 - 1.14 Final             Anticoagulation Summary  As of 4/6/2020    INR goal:   2.0-3.0   TTR:   34.3 % (9.6 mo)   INR used for dosing:   3.81! (4/6/2020)   Warfarin maintenance plan:   5 mg (5 mg x 1) every Mon, Wed, Fri; 7.5 mg (5 mg x 1.5) all other days   Full warfarin instructions:   5 mg every Mon, Wed, Fri; 7.5 mg all other days   Weekly warfarin total:   45 mg   Plan last modified:   Cece Parkinson RN (4/6/2020)   Next INR check:   4/20/2020   Priority:   INR   Target end date:       Indications    Acute deep vein thrombosis (DVT) of other specified vein of right lower extremity (H) [I82.491]  Antiphospholipid antibody positive [R76.0]  Long term current use of anticoagulants with INR goal of 2.0-3.0 [Z79.01]             Anticoagulation Episode Summary     INR check location:   Clinic Lab    Preferred lab:        Send INR reminders to:   CHENTE SMILEY    Comments:   Unprovoked dvt  Concern for antiphospolipid antibody syndrome  Anticoagulation likely indefinite but will be reevaluated in 9/2019      Anticoagulation Care Providers     Provider Role Specialty Phone number    Ameya Bacon MD [facility] Hematology & Oncology [phone]

## 2020-04-20 ENCOUNTER — TELEPHONE (OUTPATIENT)
Dept: PEDIATRICS | Facility: CLINIC | Age: 25
End: 2020-04-20

## 2020-04-20 DIAGNOSIS — I82.491 ACUTE DEEP VEIN THROMBOSIS (DVT) OF OTHER SPECIFIED VEIN OF RIGHT LOWER EXTREMITY (H): ICD-10-CM

## 2020-04-20 DIAGNOSIS — R76.0 ANTIPHOSPHOLIPID ANTIBODY POSITIVE: ICD-10-CM

## 2020-04-20 DIAGNOSIS — Z79.01 LONG TERM CURRENT USE OF ANTICOAGULANTS WITH INR GOAL OF 2.0-3.0: ICD-10-CM

## 2020-04-20 LAB — INR PPP: 2.72 (ref 0.86–1.14)

## 2020-04-20 PROCEDURE — 36415 COLL VENOUS BLD VENIPUNCTURE: CPT | Performed by: INTERNAL MEDICINE

## 2020-04-20 PROCEDURE — 85610 PROTHROMBIN TIME: CPT | Performed by: INTERNAL MEDICINE

## 2020-04-20 NOTE — TELEPHONE ENCOUNTER
ANTICOAGULATION MANAGEMENT     Patient Name:  Ronna Coleman  Date:  2020    ASSESSMENT /SUBJECTIVE:    Today's INR result of 2.72 is therapeutic. Goal INR of 2.0-3.0      Warfarin dose taken: Warfarin taken as previously instructed    Diet: No new diet changes affecting INR    Medication changes/ interactions: No new medications/supplements affecting INR    Previous INR: Therapeutic     S/S of bleeding or thromboembolism: No    New injury or illness: No    Upcoming surgery, procedure or cardioversion: No    Additional findings: None      PLAN:    Spoke with Ronna regarding INR result and instructed:     Warfarin Dosing Instructions: Continue your current warfarin dose 5 MWF and 7.5 ROW    Instructed patient to follow up no later than: 2 weeks  Lab visit scheduled    Education provided: None required      Ronna verbalizes understanding and agrees to warfarin dosing plan.    Instructed to call the Anticoagulation Clinic for any changes, questions or concerns. (#[phone])        OBJECTIVE:  INR   Date Value Ref Range Status   2020 2.72 (H) 0.86 - 1.14 Final             Anticoagulation Summary  As of 2020    INR goal:   2.0-3.0   TTR:   33.9 % (10 mo)   INR used for dosin.72 (2020)   Warfarin maintenance plan:   5 mg (5 mg x 1) every Mon, Wed, Fri; 7.5 mg (5 mg x 1.5) all other days   Full warfarin instructions:   5 mg every Mon, Wed, Fri; 7.5 mg all other days   Weekly warfarin total:   45 mg   No change documented:   Cece Parkinson RN   Plan last modified:   Cece Parkinson RN (2020)   Next INR check:   2020   Priority:   INR   Target end date:       Indications    Acute deep vein thrombosis (DVT) of other specified vein of right lower extremity (H) [I82.491]  Antiphospholipid antibody positive [R76.0]  Long term current use of anticoagulants with INR goal of 2.0-3.0 [Z79.01]             Anticoagulation Episode Summary     INR check location:   Clinic Lab    Preferred  lab:       Send INR reminders to:   CHENTE SMILEY    Comments:   Unprovoked dvt  Concern for antiphospolipid antibody syndrome  Anticoagulation likely indefinite but will be reevaluated in 9/2019      Anticoagulation Care Providers     Provider Role Specialty Phone number    Ameya Bacon MD [facility] Hematology & Oncology [phone]

## 2020-04-30 ENCOUNTER — VIRTUAL VISIT (OUTPATIENT)
Dept: FAMILY MEDICINE | Facility: OTHER | Age: 25
End: 2020-04-30

## 2020-04-30 NOTE — PROGRESS NOTES
"Date: 2020 13:39:15  Clinician: Jeanine Jones  Clinician NPI: [id number]  Patient: Ronna Coleman  Patient : 1995  Patient Address: [address] [place], [place] [address] [address]  Patient Phone: [phone]  Visit Protocol: URI  Patient Summary:  Ronna is a 24 year old ( : 1995 ) female who initiated a Visit for COVID-19 (Coronavirus) evaluation and screening. When asked the question \"Please sign me up to receive news, health information and promotions. \", Ronna responded \"No\".    Ronna denies having malaise, fever, myalgias, rhinitis, facial pain or pressure, sore throat, cough, nasal congestion, vomiting, nausea, teeth pain, ageusia, anosmia, diarrhea, ear pain, headache, wheezing, and chills. She also denies taking antibiotic medication for the symptoms and having recent facial or sinus surgery in the past 60 days. She is not experiencing dyspnea.    Pertinent COVID-19 (Coronavirus) information  Ronna does not work or volunteer as healthcare worker or a  and does not work or volunteer in a healthcare facility.   She lives with a healthcare worker.   Ronna has had a close contact with a laboratory-confirmed COVID-19 patient within 14 days of symptom onset. She also has had a close contact with a suspected COVID-19 patient within 14 days of symptom onset. Additional information about contact with COVID-19 (Coronavirus) patient as reported by the patient (free text): Her 2 Roomates work in LTC facility. One tested positive the other negative. Other roomate has symptoms but untested   Pertinent medical history  Ronna does not get yeast infections when she takes antibiotics.   Ronna needs a return to work/school note.   Weight: 195 lbs   Ronna does not smoke or use smokeless tobacco.   She denies pregnancy and denies breastfeeding. She has menstruated in the past month.   Additional information as reported by the patient (free text): she is type 1 diabetic, celiac, Hashimotos, " and blood clotting ( anitphosolipid anti body  disorder)   Weight: 195 lbs  A synchronous phone visit was initiated by the provider for the following reason: question about quarantine    MEDICATIONS: warfarin oral, Humalog U-100 Insulin subcutaneous, levothyroxine oral, ALLERGIES: NKDA  Clinician Response:  Dear Ronna,   Dear Ronna  Based on the details you've shared, you may have been exposed to coronavirus (COVID-19). You do not need to be tested at this time.  What should I do?  For safety, it's very important to follow these rules. Do this for 14 days after the date you were last exposed to the virus.   Stay home and away from others. Don't go to work, school or anywhere else.    No hugging, kissing or shaking hands.   Don't let anyone visit.   Cover your mouth and nose with a mask, tissue or wash cloth to avoid spreading germs.   Wash your hands and face often. Use soap and water.   What are the symptoms of COVID-19?  The most common symptoms are cough, fever and trouble breathing. After 14 days, if there's still no cough or fever, you likely don't have this virus.  If you develop a cough or fever, follow these guidelines.   If you're normally healthy: Please start another [facility] visit to report your symptoms. Go to [email or web address].   If you have a serious health problem (like cancer, heart failure, an organ transplant or kidney disease): Call your specialty clinic. Let them know that you might have COVID-19.    Where can I get more information?  To learn more about COVID-19 and how to care for yourself at home, please visit the CDC website at https://www.cdc.gov/coronavirus/2019-ncov/about/steps-when-sick.html.  For more about your care at [facility], please visit [email or web address]  If you are interested in becoming part of a [facility] clinic trial related to COVID19 please go to [email or web address] for information, if you qualify.     COVID-19 (Coronavirus)  General Information  Because there is currently no vaccine to prevent infection, the best way to protect yourself is to avoid being exposed to this virus. Common symptoms of COVID-19 include but are not limited to fever, cough, and shortness of breath. These symptoms appear 2-14 days after you are exposed to the virus that causes COVID-19. Click here for more information from the CDC on how to protect yourself.  If you are sick with COVID-19 or suspect you are infected with the virus that causes COVID-19, follow the steps here from the CDC to help prevent the disease from spreading to people in your home and community.  Click here for general information from the CDC on testing.  If you develop any of these emergency warning signs for COVID-19, get medical attention immediately:     Trouble breathing    Persistent pain or pressure in the chest    New confusion or inability to arouse    Bluish lips or face      Call your doctor or clinic before going in. Call 911 if you have a medical emergency and notify the  you have or think you may have COVID-19.  For more detailed and up to date information on COVID-19 (Coronavirus), please visit the CDC website.   Diagnosis: Contact with and (suspected) exposure to other viral communicable diseases  Diagnosis ICD: Z20.828  Triage Notes: I reviewed the patient's history, verified their identity, and explained the Visit process.    Has been self-quarantined at a hotel since last Thursday, 4/23/20 after roommate was tested positive for COVID-19. Work note will be provided to return to work, at [employer], after her 14 day period of self-quarantine. Return to work will be for 5/7/20.    Ronna denies any related symptoms to COVID-19 as stated above.  Synchronous Triage: phone, status: completed, duration: 243 seconds

## 2020-05-14 DIAGNOSIS — I82.491 ACUTE DEEP VEIN THROMBOSIS (DVT) OF OTHER SPECIFIED VEIN OF RIGHT LOWER EXTREMITY (H): ICD-10-CM

## 2020-05-14 LAB — INR PPP: 3.24 (ref 0.86–1.14)

## 2020-05-14 PROCEDURE — 85610 PROTHROMBIN TIME: CPT | Performed by: INTERNAL MEDICINE

## 2020-05-14 PROCEDURE — 36415 COLL VENOUS BLD VENIPUNCTURE: CPT | Performed by: INTERNAL MEDICINE

## 2020-05-15 ENCOUNTER — ANTICOAGULATION THERAPY VISIT (OUTPATIENT)
Dept: PEDIATRICS | Facility: CLINIC | Age: 25
End: 2020-05-15
Payer: COMMERCIAL

## 2020-05-15 DIAGNOSIS — R76.0 ANTIPHOSPHOLIPID ANTIBODY POSITIVE: ICD-10-CM

## 2020-05-15 DIAGNOSIS — I82.491 ACUTE DEEP VEIN THROMBOSIS (DVT) OF OTHER SPECIFIED VEIN OF RIGHT LOWER EXTREMITY (H): ICD-10-CM

## 2020-05-15 DIAGNOSIS — Z79.01 LONG TERM CURRENT USE OF ANTICOAGULANTS WITH INR GOAL OF 2.0-3.0: ICD-10-CM

## 2020-05-15 PROCEDURE — 99207 ZZC NO CHARGE NURSE ONLY: CPT | Performed by: FAMILY MEDICINE

## 2020-05-15 NOTE — PROGRESS NOTES
ANTICOAGULATION FOLLOW-UP CLINIC VISIT    Patient Name:  Ronna Coleman  Date:  5/15/2020  Contact Type:  Telephone    SUBJECTIVE:  Patient Findings         Clinical Outcomes     Negatives:   Major bleeding event, Thromboembolic event, Anticoagulation-related hospital admission, Anticoagulation-related ED visit, Anticoagulation-related fatality           OBJECTIVE    Recent labs: (last 7 days)     05/14/20  1521   INR 3.24*       ASSESSMENT / PLAN  No question data found.  Anticoagulation Summary  As of 5/15/2020    INR goal:   2.0-3.0   TTR:   35.4 % (10.8 mo)   INR used for dosing:   3.24! (5/14/2020)   Warfarin maintenance plan:   7.5 mg (5 mg x 1.5) every Sun, Tue, Thu; 5 mg (5 mg x 1) all other days   Full warfarin instructions:   7.5 mg every Sun, Tue, Thu; 5 mg all other days   Weekly warfarin total:   42.5 mg   Plan last modified:   Cece Parkinson RN (5/15/2020)   Next INR check:   5/28/2020   Priority:   INR   Target end date:       Indications    Acute deep vein thrombosis (DVT) of other specified vein of right lower extremity (H) [I82.491]  Antiphospholipid antibody positive [R76.0]  Long term current use of anticoagulants with INR goal of 2.0-3.0 [Z79.01]             Anticoagulation Episode Summary     INR check location:   Clinic Lab    Preferred lab:       Send INR reminders to:   CHENTE SMILEY    Comments:   Unprovoked dvt  Concern for antiphospolipid antibody syndrome  Anticoagulation likely indefinite but will be reevaluated in 9/2019      Anticoagulation Care Providers     Provider Role Specialty Phone number    Ameya Bacon MD Responsible Hematology & Oncology [phone]            See the Encounter Report to view Anticoagulation Flowsheet and Dosing Calendar (Go to Encounters tab in chart review, and find the Anticoagulation Therapy Visit)        Cece Parkinson RN

## 2020-05-28 DIAGNOSIS — I82.491 ACUTE DEEP VEIN THROMBOSIS (DVT) OF OTHER SPECIFIED VEIN OF RIGHT LOWER EXTREMITY (H): ICD-10-CM

## 2020-05-28 LAB — INR PPP: 2.6 (ref 0.86–1.14)

## 2020-05-28 PROCEDURE — 85610 PROTHROMBIN TIME: CPT | Performed by: INTERNAL MEDICINE

## 2020-05-28 PROCEDURE — 36415 COLL VENOUS BLD VENIPUNCTURE: CPT | Performed by: INTERNAL MEDICINE

## 2020-05-29 ENCOUNTER — ANTICOAGULATION THERAPY VISIT (OUTPATIENT)
Dept: NURSING | Facility: CLINIC | Age: 25
End: 2020-05-29
Payer: COMMERCIAL

## 2020-05-29 DIAGNOSIS — R76.0 ANTIPHOSPHOLIPID ANTIBODY POSITIVE: ICD-10-CM

## 2020-05-29 DIAGNOSIS — I82.491 ACUTE DEEP VEIN THROMBOSIS (DVT) OF OTHER SPECIFIED VEIN OF RIGHT LOWER EXTREMITY (H): ICD-10-CM

## 2020-05-29 DIAGNOSIS — Z79.01 LONG TERM CURRENT USE OF ANTICOAGULANTS WITH INR GOAL OF 2.0-3.0: ICD-10-CM

## 2020-05-29 NOTE — PROGRESS NOTES
ANTICOAGULATION FOLLOW-UP CLINIC VISIT    Patient Name:  Ronna Coleman  Date:  2020  Contact Type:  Telephone    SUBJECTIVE:  Patient Findings         Clinical Outcomes     Negatives:   Major bleeding event, Thromboembolic event, Anticoagulation-related hospital admission, Anticoagulation-related ED visit, Anticoagulation-related fatality           OBJECTIVE    Recent labs: (last 7 days)     20  1116   INR 2.60*       ASSESSMENT / PLAN  INR assessment THER    Recheck INR In: 3 WEEKS    INR Location Clinic      Anticoagulation Summary  As of 2020    INR goal:   2.0-3.0   TTR:   36.5 % (11.3 mo)   INR used for dosin.60 (2020)   Warfarin maintenance plan:   7.5 mg (5 mg x 1.5) every Sun, Tue, Thu; 5 mg (5 mg x 1) all other days   Full warfarin instructions:   7.5 mg every Sun, Tue, Thu; 5 mg all other days   Weekly warfarin total:   42.5 mg   No change documented:   Elizabeth Recinos RN   Plan last modified:   Cece Parkinson RN (5/15/2020)   Next INR check:   2020   Priority:   INR   Target end date:       Indications    Acute deep vein thrombosis (DVT) of other specified vein of right lower extremity (H) [I82.491]  Antiphospholipid antibody positive [R76.0]  Long term current use of anticoagulants with INR goal of 2.0-3.0 [Z79.01]             Anticoagulation Episode Summary     INR check location:   Clinic Lab    Preferred lab:       Send INR reminders to:   CHENTE SMILEY    Comments:   Unprovoked dvt  Concern for antiphospolipid antibody syndrome  Anticoagulation likely indefinite but will be reevaluated in 2019      Anticoagulation Care Providers     Provider Role Specialty Phone number    Ameya Bacon MD Responsible Hematology & Oncology [phone]            See the Encounter Report to view Anticoagulation Flowsheet and Dosing Calendar (Go to Encounters tab in chart review, and find the Anticoagulation Therapy Visit)    Therapeutic INR 2.6. Will continue  maintenance dose and recheck in 3 week(s).    Patient states negative for signs and symptoms of bleeding or blood clots, changes in medication, changes in diet, any signs of infection or antibiotic use, anything new OTC or herbal medications, any missed or extra doses of the warfarin.    Patient informed of the symptoms to be seen for either by INR nurse or ER.    Patient ask if refill of warfarin is needed. Rx sent no    Elizabeth Recinos RN

## 2020-06-18 DIAGNOSIS — I82.491 ACUTE DEEP VEIN THROMBOSIS (DVT) OF OTHER SPECIFIED VEIN OF RIGHT LOWER EXTREMITY (H): ICD-10-CM

## 2020-06-18 LAB — INR PPP: 2.44 (ref 0.86–1.14)

## 2020-06-18 PROCEDURE — 36415 COLL VENOUS BLD VENIPUNCTURE: CPT | Performed by: INTERNAL MEDICINE

## 2020-06-18 PROCEDURE — 85610 PROTHROMBIN TIME: CPT | Performed by: INTERNAL MEDICINE

## 2020-06-19 ENCOUNTER — TELEPHONE (OUTPATIENT)
Dept: PEDIATRICS | Facility: CLINIC | Age: 25
End: 2020-06-19

## 2020-06-19 ENCOUNTER — ANTICOAGULATION THERAPY VISIT (OUTPATIENT)
Dept: NURSING | Facility: CLINIC | Age: 25
End: 2020-06-19
Payer: COMMERCIAL

## 2020-06-19 DIAGNOSIS — Z79.01 LONG TERM CURRENT USE OF ANTICOAGULANTS WITH INR GOAL OF 2.0-3.0: ICD-10-CM

## 2020-06-19 DIAGNOSIS — R76.0 ANTIPHOSPHOLIPID ANTIBODY POSITIVE: Primary | ICD-10-CM

## 2020-06-19 DIAGNOSIS — I82.491 ACUTE DEEP VEIN THROMBOSIS (DVT) OF OTHER SPECIFIED VEIN OF RIGHT LOWER EXTREMITY (H): ICD-10-CM

## 2020-06-19 DIAGNOSIS — R76.0 ANTIPHOSPHOLIPID ANTIBODY POSITIVE: ICD-10-CM

## 2020-06-19 RX ORDER — WARFARIN SODIUM 5 MG/1
TABLET ORAL
Qty: 111 TABLET | Refills: 0 | Status: SHIPPED | OUTPATIENT
Start: 2020-06-19 | End: 2020-09-17

## 2020-06-19 NOTE — TELEPHONE ENCOUNTER
Prescription approved per [facility] Refill Protocol.    Elizabeth Recinos RN, [facility] Triage

## 2020-06-19 NOTE — PROGRESS NOTES
ANTICOAGULATION FOLLOW-UP CLINIC VISIT    Patient Name:  Ronna Coleman  Date:  2020  Contact Type:  Telephone    SUBJECTIVE:  Patient Findings         Clinical Outcomes     Negatives:   Major bleeding event, Thromboembolic event, Anticoagulation-related hospital admission, Anticoagulation-related ED visit, Anticoagulation-related fatality           OBJECTIVE    Recent labs: (last 7 days)     20  1110   INR 2.44*       ASSESSMENT / PLAN  INR assessment THER    Recheck INR In: 4 WEEKS    INR Location Clinic      Anticoagulation Summary  As of 2020    INR goal:   2.0-3.0   TTR:   40.2 % (1 y)   INR used for dosin.44 (2020)   Warfarin maintenance plan:   7.5 mg (5 mg x 1.5) every Sun, Tue, Thu; 5 mg (5 mg x 1) all other days   Full warfarin instructions:   7.5 mg every Sun, Tue, Thu; 5 mg all other days   Weekly warfarin total:   42.5 mg   No change documented:   Elizabeth Recinos RN   Plan last modified:   Cece Parkinson RN (5/15/2020)   Next INR check:   2020   Priority:   Maintenance   Target end date:       Indications    Acute deep vein thrombosis (DVT) of other specified vein of right lower extremity (H) [I82.491]  Antiphospholipid antibody positive [R76.0]  Long term current use of anticoagulants with INR goal of 2.0-3.0 [Z79.01]             Anticoagulation Episode Summary     INR check location:   Clinic Lab    Preferred lab:       Send INR reminders to:   CHENTE SMILEY    Comments:   Unprovoked dvt  Concern for antiphospolipid antibody syndrome  Anticoagulation likely indefinite but will be reevaluated in 2019      Anticoagulation Care Providers     Provider Role Specialty Phone number    Ameya Bacon MD Responsible Hematology & Oncology [phone]            See the Encounter Report to view Anticoagulation Flowsheet and Dosing Calendar (Go to Encounters tab in chart review, and find the Anticoagulation Therapy Visit)    Therapeutic INR 2.44. Will  continue maintenance dose and recheck in 4 week(s).    Patient states negative for signs and symptoms of bleeding or blood clots, changes in medication, changes in diet, any signs of infection or antibiotic use, anything new OTC or herbal medications, any missed or extra doses of the warfarin.    Patient informed of the symptoms to be seen for either by INR nurse or ER.    Patient ask if refill of warfarin is needed. Rx sent yes    Elizabeth Recinos RN

## 2020-06-19 NOTE — TELEPHONE ENCOUNTER
Has the patient previously taken warfarin? yes  If yes, for what indication? DVT and patient has antiphospholipid antibody positive    Does the patient have any of the following indications for a higher range of 2.5-3.5:    Mitral position mechanical valve? no    Mera-Shiley, Ball and Cage or Monoleaflet valve (regardless of position) no    Other (if yes, please explain) no    Please sign INR referral. Please review and complete drop boxes.     Indication for Anticoagulation:  If nonstandard INR is desired, indicate goal range and explanation:   Expected Duration of Therapy:     Send back to loly Brandon.    Elizabeth Recinos RN, [facility] Triage

## 2020-07-22 ENCOUNTER — ANTICOAGULATION THERAPY VISIT (OUTPATIENT)
Dept: PEDIATRICS | Facility: CLINIC | Age: 25
End: 2020-07-22

## 2020-07-22 DIAGNOSIS — I82.491 ACUTE DEEP VEIN THROMBOSIS (DVT) OF OTHER SPECIFIED VEIN OF RIGHT LOWER EXTREMITY (H): ICD-10-CM

## 2020-07-22 DIAGNOSIS — R76.0 ANTIPHOSPHOLIPID ANTIBODY POSITIVE: ICD-10-CM

## 2020-07-22 DIAGNOSIS — Z79.01 LONG TERM CURRENT USE OF ANTICOAGULANTS WITH INR GOAL OF 2.0-3.0: ICD-10-CM

## 2020-07-22 LAB — INR PPP: 2.2 (ref 0.86–1.14)

## 2020-07-22 PROCEDURE — 85610 PROTHROMBIN TIME: CPT | Performed by: INTERNAL MEDICINE

## 2020-07-22 PROCEDURE — 36415 COLL VENOUS BLD VENIPUNCTURE: CPT | Performed by: INTERNAL MEDICINE

## 2020-07-22 PROCEDURE — 99207 ZZC NO CHARGE NURSE ONLY: CPT | Performed by: FAMILY MEDICINE

## 2020-07-22 NOTE — PROGRESS NOTES
ANTICOAGULATION FOLLOW-UP CLINIC VISIT    Patient Name:  Ronna Coleman  Date:  2020  Contact Type:  Telephone    SUBJECTIVE:  Patient Findings     Positives:   Emergency department visit, Change in medications    Comments:   Patient seen 20 in the ED for Pilonidal abscess of  cleft. Started on Doxycycline x 7 days.    Plans to pcp in the next 3-5 days for a wound check. Will recheck INR at that time to ensure no delayed interaction between warfarin/doxycycline.        Clinical Outcomes     Negatives:   Major bleeding event, Thromboembolic event, Anticoagulation-related hospital admission, Anticoagulation-related ED visit, Anticoagulation-related fatality    Comments:   Patient seen 20 in the ED for Pilonidal abscess of kevin cleft. Started on Doxycycline x 7 days.    Plans to pcp in the next 3-5 days for a wound check. Will recheck INR at that time to ensure no delayed interaction between warfarin/doxycycline.           OBJECTIVE    Recent labs: (last 7 days)     20  1040   INR 2.20*       ASSESSMENT / PLAN  INR assessment THER    Recheck INR In: 1 WEEK    INR Location Clinic      Anticoagulation Summary  As of 2020    INR goal:   2.0-3.0   TTR:   46.9 % (1 y)   INR used for dosin.20 (2020)   Warfarin maintenance plan:   7.5 mg (5 mg x 1.5) every Sun, Tue, Thu; 5 mg (5 mg x 1) all other days   Full warfarin instructions:   7.5 mg every Sun, Tue, Thu; 5 mg all other days   Weekly warfarin total:   42.5 mg   No change documented:   Cece Parkinson RN   Plan last modified:   Cece Parkinson RN (5/15/2020)   Next INR check:   2020   Priority:   Maintenance   Target end date:   2030    Indications    Acute deep vein thrombosis (DVT) of other specified vein of right lower extremity (H) [I82.491]  Antiphospholipid antibody positive [R76.0]  Long term current use of anticoagulants with INR goal of 2.0-3.0 [Z79.01]             Anticoagulation Episode Summary      INR check location:   Clinic Lab    Preferred lab:       Send INR reminders to:   CHENTE SMILEY    Comments:   Unprovoked dvt  Concern for antiphospolipid antibody syndrome  Anticoagulation likely indefinite but will be reevaluated in 9/2019      Anticoagulation Care Providers     Provider Role Specialty Phone number    Gorge Kebede MD Referring Internal Medicine [phone]    Ameya Bacon MD Responsible Hematology & Oncology [phone]            See the Encounter Report to view Anticoagulation Flowsheet and Dosing Calendar (Go to Encounters tab in chart review, and find the Anticoagulation Therapy Visit)        Cece Parkinson RN

## 2020-08-11 DIAGNOSIS — I82.491 ACUTE DEEP VEIN THROMBOSIS (DVT) OF OTHER SPECIFIED VEIN OF RIGHT LOWER EXTREMITY (H): ICD-10-CM

## 2020-08-11 LAB — INR PPP: 1.93 (ref 0.86–1.14)

## 2020-08-11 PROCEDURE — 36415 COLL VENOUS BLD VENIPUNCTURE: CPT | Performed by: INTERNAL MEDICINE

## 2020-08-11 PROCEDURE — 85610 PROTHROMBIN TIME: CPT | Performed by: INTERNAL MEDICINE

## 2020-08-12 ENCOUNTER — ANTICOAGULATION THERAPY VISIT (OUTPATIENT)
Dept: PEDIATRICS | Facility: CLINIC | Age: 25
End: 2020-08-12
Payer: COMMERCIAL

## 2020-08-12 DIAGNOSIS — R76.0 ANTIPHOSPHOLIPID ANTIBODY POSITIVE: ICD-10-CM

## 2020-08-12 DIAGNOSIS — Z79.01 LONG TERM CURRENT USE OF ANTICOAGULANTS WITH INR GOAL OF 2.0-3.0: ICD-10-CM

## 2020-08-12 DIAGNOSIS — I82.491 ACUTE DEEP VEIN THROMBOSIS (DVT) OF OTHER SPECIFIED VEIN OF RIGHT LOWER EXTREMITY (H): ICD-10-CM

## 2020-08-12 PROCEDURE — 99207 ZZC NO CHARGE NURSE ONLY: CPT | Performed by: FAMILY MEDICINE

## 2020-08-12 NOTE — PROGRESS NOTES
ANTICOAGULATION FOLLOW-UP CLINIC VISIT    Patient Name:  Ronna Coleman  Date:  2020  Contact Type:  Telephone/ Left DVM    SUBJECTIVE:  Patient Findings     Comments:   Unable to assess        Clinical Outcomes     Comments:   Unable to assess           OBJECTIVE    Recent labs: (last 7 days)     20  1349   INR 1.93*       ASSESSMENT / PLAN  INR assessment SUB    Recheck INR In: 4 WEEKS    INR Location Clinic      Anticoagulation Summary  As of 2020    INR goal:   2.0-3.0   TTR:   48.1 % (1 y)   INR used for dosin.93! (2020)   Warfarin maintenance plan:   7.5 mg (5 mg x 1.5) every Sun, Tue, Thu; 5 mg (5 mg x 1) all other days   Full warfarin instructions:   7.5 mg every Sun, Tue, Thu; 5 mg all other days   Weekly warfarin total:   42.5 mg   No change documented:   Cece Parkinson RN   Plan last modified:   Cece Parkinson RN (5/15/2020)   Next INR check:   2020   Priority:   Maintenance   Target end date:   2030    Indications    Acute deep vein thrombosis (DVT) of other specified vein of right lower extremity (H) [I82.491]  Antiphospholipid antibody positive [R76.0]  Long term current use of anticoagulants with INR goal of 2.0-3.0 [Z79.01]             Anticoagulation Episode Summary     INR check location:   Clinic Lab    Preferred lab:       Send INR reminders to:   CHENTE SMILEY    Comments:   Unprovoked dvt  Concern for antiphospolipid antibody syndrome  Anticoagulation likely indefinite but will be reevaluated in 2019      Anticoagulation Care Providers     Provider Role Specialty Phone number    Gorge Kebede MD Referring Internal Medicine [phone]    Ameya Bacon MD Responsible Hematology & Oncology [phone]            See the Encounter Report to view Anticoagulation Flowsheet and Dosing Calendar (Go to Encounters tab in chart review, and find the Anticoagulation Therapy Visit)    The following message was left for the patient:    INR:  1.93    If there has been NO changes to your medications, diet, health, missed/extra doses of warfarin, scheduled procedures, signs and/or symptoms of bleeding or clotting then please take your medication and recheck as recommended below:    Continue 7.5 mg Tues/Thrs/Sun and 5 mg ROW. Recheck in 4 weeks, 9/8/20.    If you have any changes, concerns, questions, or need help scheduling an appointment please contact the Anticoagulation Clinic.    Thanks,    HEBER Zhao  [facility]  [phone]

## 2020-09-17 DIAGNOSIS — I82.491 ACUTE DEEP VEIN THROMBOSIS (DVT) OF OTHER SPECIFIED VEIN OF RIGHT LOWER EXTREMITY (H): ICD-10-CM

## 2020-09-17 DIAGNOSIS — R76.0 ANTIPHOSPHOLIPID ANTIBODY POSITIVE: ICD-10-CM

## 2020-09-17 DIAGNOSIS — Z79.01 LONG TERM CURRENT USE OF ANTICOAGULANTS WITH INR GOAL OF 2.0-3.0: ICD-10-CM

## 2020-09-17 RX ORDER — WARFARIN SODIUM 5 MG/1
TABLET ORAL
Qty: 60 TABLET | Refills: 0 | Status: SHIPPED | OUTPATIENT
Start: 2020-09-17 | End: 2020-10-19

## 2020-09-17 NOTE — TELEPHONE ENCOUNTER
Rx approved for 60 tablets per protocol. Pt was due for an INR test on 9/8/20. Note sent to pharmacy

## 2020-10-18 DIAGNOSIS — Z79.01 LONG TERM CURRENT USE OF ANTICOAGULANTS WITH INR GOAL OF 2.0-3.0: ICD-10-CM

## 2020-10-18 DIAGNOSIS — I82.491 ACUTE DEEP VEIN THROMBOSIS (DVT) OF OTHER SPECIFIED VEIN OF RIGHT LOWER EXTREMITY (H): ICD-10-CM

## 2020-10-18 DIAGNOSIS — R76.0 ANTIPHOSPHOLIPID ANTIBODY POSITIVE: ICD-10-CM

## 2020-10-19 RX ORDER — WARFARIN SODIUM 5 MG/1
TABLET ORAL
Qty: 40 TABLET | Refills: 0 | Status: SHIPPED | OUTPATIENT
Start: 2020-10-19 | End: 2020-11-19

## 2020-10-19 NOTE — TELEPHONE ENCOUNTER
This writer attempted to contact Ronna on 10/19/20      Reason for call schedule INR and left detailed message.      If patient calls back:   Schedule Lab appointment within 1 week with lab, document that pt called and close encounter     Message left of 30 supply being sent to pharmacy.    Elizabeth Recinos RN

## 2020-10-30 DIAGNOSIS — I82.491 ACUTE DEEP VEIN THROMBOSIS (DVT) OF OTHER SPECIFIED VEIN OF RIGHT LOWER EXTREMITY (H): ICD-10-CM

## 2020-10-30 LAB
CAPILLARY BLOOD COLLECTION: NORMAL
INR PPP: 1.81 (ref 0.86–1.14)

## 2020-10-30 PROCEDURE — 36415 COLL VENOUS BLD VENIPUNCTURE: CPT | Performed by: INTERNAL MEDICINE

## 2020-10-30 PROCEDURE — 85610 PROTHROMBIN TIME: CPT | Performed by: INTERNAL MEDICINE

## 2020-11-02 ENCOUNTER — ANTICOAGULATION THERAPY VISIT (OUTPATIENT)
Dept: PEDIATRICS | Facility: CLINIC | Age: 25
End: 2020-11-02
Payer: COMMERCIAL

## 2020-11-02 DIAGNOSIS — R76.0 ANTIPHOSPHOLIPID ANTIBODY POSITIVE: ICD-10-CM

## 2020-11-02 DIAGNOSIS — I82.491 ACUTE DEEP VEIN THROMBOSIS (DVT) OF OTHER SPECIFIED VEIN OF RIGHT LOWER EXTREMITY (H): ICD-10-CM

## 2020-11-02 DIAGNOSIS — Z79.01 LONG TERM CURRENT USE OF ANTICOAGULANTS WITH INR GOAL OF 2.0-3.0: ICD-10-CM

## 2020-11-02 PROCEDURE — 99207 PR NO CHARGE NURSE ONLY: CPT | Performed by: FAMILY MEDICINE

## 2020-11-02 NOTE — PROGRESS NOTES
ANTICOAGULATION FOLLOW-UP CLINIC VISIT    Patient Name:  Ronna Coleman  Date:  2020  Contact Type:  Telephone    SUBJECTIVE:  Patient Findings     Comments:  The patient was assessed for diet, medication, and activity level changes, missed or extra doses, bruising or bleeding, with no problem findings.     Past 2 INRs have been subtherapeutic. Will increase maintenance dose by 2.5 mg (5.9%) and recheck in 2 weeks.        Clinical Outcomes     Negatives:  Major bleeding event, Thromboembolic event, Anticoagulation-related hospital admission, Anticoagulation-related ED visit, Anticoagulation-related fatality    Comments:  The patient was assessed for diet, medication, and activity level changes, missed or extra doses, bruising or bleeding, with no problem findings.     Past 2 INRs have been subtherapeutic. Will increase maintenance dose by 2.5 mg (5.9%) and recheck in 2 weeks.           OBJECTIVE    Recent labs: (last 7 days)     10/30/20  1547   INR 1.81*       ASSESSMENT / PLAN  INR assessment THER    Recheck INR In: 2 WEEKS    INR Location Clinic      Anticoagulation Summary  As of 2020    INR goal:  2.0-3.0   TTR:  37.4 % (1 y)   INR used for dosin.81 (10/30/2020)   Warfarin maintenance plan:  5 mg (5 mg x 1) every Mon, Wed, Fri; 7.5 mg (5 mg x 1.5) all other days   Full warfarin instructions:  5 mg every Mon, Wed, Fri; 7.5 mg all other days   Weekly warfarin total:  45 mg   Plan last modified:  Cece Parkinson RN (2020)   Next INR check:  2020   Priority:  Maintenance   Target end date:  2030    Indications    Acute deep vein thrombosis (DVT) of other specified vein of right lower extremity (H) [I82.491]  Antiphospholipid antibody positive [R76.0]  Long term current use of anticoagulants with INR goal of 2.0-3.0 [Z79.01]             Anticoagulation Episode Summary     INR check location:  Clinic Lab    Preferred lab:      Send INR reminders to:  CHENTE SMILEY     Comments:  Unprovoked dvt  Concern for antiphospolipid antibody syndrome  Anticoagulation likely indefinite but will be reevaluated in 9/2019      Anticoagulation Care Providers     Provider Role Specialty Phone number    Gorge Kebede MD Referring Internal Medicine [phone]    Ameya Bacon MD Responsible Hematology & Oncology [phone]            See the Encounter Report to view Anticoagulation Flowsheet and Dosing Calendar (Go to Encounters tab in chart review, and find the Anticoagulation Therapy Visit)        Cece Parkinson RN

## 2020-11-12 DIAGNOSIS — I82.491 ACUTE DEEP VEIN THROMBOSIS (DVT) OF OTHER SPECIFIED VEIN OF RIGHT LOWER EXTREMITY (H): ICD-10-CM

## 2020-11-12 LAB
CAPILLARY BLOOD COLLECTION: NORMAL
INR PPP: 2.22 (ref 0.86–1.14)

## 2020-11-12 PROCEDURE — 36416 COLLJ CAPILLARY BLOOD SPEC: CPT | Performed by: INTERNAL MEDICINE

## 2020-11-12 PROCEDURE — 85610 PROTHROMBIN TIME: CPT | Performed by: INTERNAL MEDICINE

## 2020-11-13 ENCOUNTER — ANTICOAGULATION THERAPY VISIT (OUTPATIENT)
Dept: PHARMACY | Facility: CLINIC | Age: 25
End: 2020-11-13

## 2020-11-13 DIAGNOSIS — Z79.01 LONG TERM CURRENT USE OF ANTICOAGULANTS WITH INR GOAL OF 2.0-3.0: ICD-10-CM

## 2020-11-13 DIAGNOSIS — R76.0 ANTIPHOSPHOLIPID ANTIBODY POSITIVE: ICD-10-CM

## 2020-11-13 DIAGNOSIS — I82.491 ACUTE DEEP VEIN THROMBOSIS (DVT) OF OTHER SPECIFIED VEIN OF RIGHT LOWER EXTREMITY (H): ICD-10-CM

## 2020-11-13 NOTE — PROGRESS NOTES
ANTICOAGULATION MANAGEMENT     Patient Name:  Ronna Coleman  Date:  2020    ASSESSMENT /SUBJECTIVE:    Today's INR result of 2.22 is therapeutic. Goal INR of 2.0-3.0      Warfarin dose taken: Warfarin taken as instructed    Diet: No new diet changes affecting INR    Medication changes/ interactions: No new medications/supplements affecting INR    Previous INR: Subtherapeutic     S/S of bleeding or thromboembolism: No    New injury or illness: No    Upcoming surgery, procedure or cardioversion: No    Additional findings: None      PLAN:    Telephone call with Ronna regarding INR result and instructed:     Warfarin Dosing Instructions: Continue your current warfarin dose 5 mg M-W-; 7.5 mg all other days.    Instructed patient to follow up no later than: 3 weeks  Lab visit scheduled    Education provided: None required      Ronna verbalizes understanding and agrees to warfarin dosing plan.    Instructed to call the Anticoagulation Clinic for any changes, questions or concerns. (#[phone])        Elizabeth Recinos RN      OBJECTIVE:  Recent labs: (last 7 days)     20  1403   INR 2.22*         INR assessment THER    Recheck INR In: 3 WEEKS    INR Location Clinic      Anticoagulation Summary  As of 2020    INR goal:  2.0-3.0   TTR:  36.1 % (1 y)   INR used for dosin.22 (2020)   Warfarin maintenance plan:  5 mg (5 mg x 1) every Mon, Wed, Fri; 7.5 mg (5 mg x 1.5) all other days   Full warfarin instructions:  5 mg every Mon, Wed, Fri; 7.5 mg all other days   Weekly warfarin total:  45 mg   No change documented:  Elizabeth Recinos RN   Plan last modified:  Cece Parkinson RN (2020)   Next INR check:  12/3/2020   Priority:  Maintenance   Target end date:  2030    Indications    Acute deep vein thrombosis (DVT) of other specified vein of right lower extremity (H) [I82.491]  Antiphospholipid antibody positive [R76.0]  Long term current use of anticoagulants with INR goal  of 2.0-3.0 [Z79.01]             Anticoagulation Episode Summary     INR check location:  Clinic Lab    Preferred lab:      Send INR reminders to:  CHENTE SMILEY    Comments:  Unprovoked dvt  Concern for antiphospolipid antibody syndrome  Anticoagulation likely indefinite but will be reevaluated in 9/2019      Anticoagulation Care Providers     Provider Role Specialty Phone number    Gorge Kebede MD Referring Internal Medicine [phone]    Ameya Bacon MD Responsible Hematology & Oncology [phone]         Anticoagulation Management    Unable to reach Ronna today.    Today's INR result of 2.2 is therapeutic (goal INR of 2.0-3.0).  Result received from: Clinic Lab    Follow up required to confirm warfarin dose taken and assess for changes    [facility]      Anticoagulation clinic to follow up    Elizabeth Recinos RN  [phone]

## 2020-12-03 ENCOUNTER — ANTICOAGULATION THERAPY VISIT (OUTPATIENT)
Dept: PEDIATRICS | Facility: CLINIC | Age: 25
End: 2020-12-03

## 2020-12-03 DIAGNOSIS — I82.491 ACUTE DEEP VEIN THROMBOSIS (DVT) OF OTHER SPECIFIED VEIN OF RIGHT LOWER EXTREMITY (H): ICD-10-CM

## 2020-12-03 DIAGNOSIS — R76.0 ANTIPHOSPHOLIPID ANTIBODY POSITIVE: ICD-10-CM

## 2020-12-03 DIAGNOSIS — Z79.01 LONG TERM CURRENT USE OF ANTICOAGULANTS WITH INR GOAL OF 2.0-3.0: ICD-10-CM

## 2020-12-03 LAB
CAPILLARY BLOOD COLLECTION: NORMAL
INR PPP: 1.9 (ref 0.86–1.14)

## 2020-12-03 PROCEDURE — 85610 PROTHROMBIN TIME: CPT | Performed by: INTERNAL MEDICINE

## 2020-12-03 PROCEDURE — 36416 COLLJ CAPILLARY BLOOD SPEC: CPT | Performed by: INTERNAL MEDICINE

## 2020-12-03 NOTE — PROGRESS NOTES
ANTICOAGULATION MANAGEMENT     Patient Name:  Ronna Coleman  Date:  12/3/2020    ASSESSMENT /SUBJECTIVE:    Today's INR result of 1.9 is subtherapeutic. Goal INR of 2.0-3.0      Warfarin dose taken: Warfarin taken as instructed    Diet: No new diet changes affecting INR    Medication changes/ interactions: No new medications/supplements affecting INR    Previous INR: Therapeutic     S/S of bleeding or thromboembolism: No    New injury or illness: No    Upcoming surgery, procedure or cardioversion: No    Additional findings: None      PLAN:    Telephone call with Ronna regarding INR result and instructed:     Warfarin Dosing Instructions: Change your warfarin dose to 5 mg every Sun and Wed; 7.5 mg all other days (5.6% increase)    Instructed patient to follow up no later than: 2 weeks  Lab visit scheduled    Education provided: Target INR goal and significance of current INR result      Ronna verbalizes understanding and agrees to warfarin dosing plan.    Instructed to call the Anticoagulation Clinic for any changes, questions or concerns. (#[phone])        Saeid Denise RN      OBJECTIVE:  Recent labs: (last 7 days)     20  1301   INR 1.90*         No question data found.  Anticoagulation Summary  As of 12/3/2020    INR goal:  2.0-3.0   TTR:  34.5 % (1 y)   INR used for dosin.90 (12/3/2020)   Warfarin maintenance plan:  5 mg (5 mg x 1) every Mon, Wed, Fri; 7.5 mg (5 mg x 1.5) all other days   Full warfarin instructions:  5 mg every Mon, Wed, Fri; 7.5 mg all other days   Weekly warfarin total:  45 mg   Plan last modified:  Cece Parkinson RN (2020)   Next INR check:     Priority:  Maintenance   Target end date:  2030    Indications    Acute deep vein thrombosis (DVT) of other specified vein of right lower extremity (H) [I82.491]  Antiphospholipid antibody positive [R76.0]  Long term current use of anticoagulants with INR goal of 2.0-3.0 [Z79.01]             Anticoagulation Episode  Summary     INR check location:  Clinic Lab    Preferred lab:      Send INR reminders to:  CHENTE SMILEY    Comments:  Unprovoked dvt  Concern for antiphospolipid antibody syndrome  Anticoagulation likely indefinite but will be reevaluated in 9/2019      Anticoagulation Care Providers     Provider Role Specialty Phone number    Gorge Kebede MD Referring Internal Medicine [phone]    Ameya Bacon MD Responsible Hematology & Oncology [phone]

## 2020-12-13 ENCOUNTER — HEALTH MAINTENANCE LETTER (OUTPATIENT)
Age: 25
End: 2020-12-13

## 2020-12-18 DIAGNOSIS — I82.491 ACUTE DEEP VEIN THROMBOSIS (DVT) OF OTHER SPECIFIED VEIN OF RIGHT LOWER EXTREMITY (H): ICD-10-CM

## 2020-12-18 LAB — INR PPP: 2.36 (ref 0.86–1.14)

## 2020-12-18 PROCEDURE — 85610 PROTHROMBIN TIME: CPT | Performed by: INTERNAL MEDICINE

## 2020-12-18 PROCEDURE — 36416 COLLJ CAPILLARY BLOOD SPEC: CPT | Performed by: INTERNAL MEDICINE

## 2020-12-21 ENCOUNTER — ANTICOAGULATION THERAPY VISIT (OUTPATIENT)
Dept: PEDIATRICS | Facility: CLINIC | Age: 25
End: 2020-12-21
Payer: COMMERCIAL

## 2020-12-21 DIAGNOSIS — R76.0 ANTIPHOSPHOLIPID ANTIBODY POSITIVE: ICD-10-CM

## 2020-12-21 DIAGNOSIS — Z79.01 LONG TERM CURRENT USE OF ANTICOAGULANTS WITH INR GOAL OF 2.0-3.0: ICD-10-CM

## 2020-12-21 DIAGNOSIS — I82.491 ACUTE DEEP VEIN THROMBOSIS (DVT) OF OTHER SPECIFIED VEIN OF RIGHT LOWER EXTREMITY (H): ICD-10-CM

## 2020-12-21 PROCEDURE — 99207 PR NO CHARGE NURSE ONLY: CPT | Performed by: FAMILY MEDICINE

## 2020-12-21 NOTE — PROGRESS NOTES
ANTICOAGULATION FOLLOW-UP CLINIC VISIT    Patient Name:  Ronna Coleman  Date:  2020  Contact Type:  Telephone    SUBJECTIVE:  Patient Findings     Comments:  The patient was assessed for diet, medication, and activity level changes, missed or extra doses, bruising or bleeding, with no problem findings.          Clinical Outcomes     Negatives:  Major bleeding event, Thromboembolic event, Anticoagulation-related hospital admission, Anticoagulation-related ED visit, Anticoagulation-related fatality    Comments:  The patient was assessed for diet, medication, and activity level changes, missed or extra doses, bruising or bleeding, with no problem findings.             OBJECTIVE    Recent labs: (last 7 days)     20  1528   INR 2.36*       ASSESSMENT / PLAN  INR assessment THER    Recheck INR In: 3 WEEKS    INR Location Clinic      Anticoagulation Summary  As of 2020    INR goal:  2.0-3.0   TTR:  37.1 % (1 y)   INR used for dosin.36 (2020)   Warfarin maintenance plan:  5 mg (5 mg x 1) every Sun, Wed; 7.5 mg (5 mg x 1.5) all other days   Full warfarin instructions:  5 mg every Sun, Wed; 7.5 mg all other days   Weekly warfarin total:  47.5 mg   No change documented:  Cece Parkinson RN   Plan last modified:  Saeid Denise RN (12/3/2020)   Next INR check:  2021   Priority:  Maintenance   Target end date:  2030    Indications    Acute deep vein thrombosis (DVT) of other specified vein of right lower extremity (H) [I82.491]  Antiphospholipid antibody positive [R76.0]  Long term current use of anticoagulants with INR goal of 2.0-3.0 [Z79.01]             Anticoagulation Episode Summary     INR check location:  Clinic Lab    Preferred lab:      Send INR reminders to:  CHENTE SMILEY    Comments:  Unprovoked dvt  Concern for antiphospolipid antibody syndrome  Anticoagulation likely indefinite but will be reevaluated in 2019      Anticoagulation Care Providers     Provider  Role Specialty Phone number    Gorge Kebede MD Referring Internal Medicine [phone]    Ameya Bacon MD Responsible Hematology & Oncology [phone]            See the Encounter Report to view Anticoagulation Flowsheet and Dosing Calendar (Go to Encounters tab in chart review, and find the Anticoagulation Therapy Visit)        Cece Parkinson RN

## 2020-12-21 NOTE — PROGRESS NOTES
Anticoagulation Management    Unable to reach Ronna today.    Today's INR result of 2.36 is therapeutic (goal INR of 2.0-3.0).  Result received from: Clinic Lab    Follow up required to confirm warfarin dose taken and assess for changes    [facility]    [facility]agulation clinic to follow up    Cece Parkinson RN

## 2020-12-24 DIAGNOSIS — R76.0 ANTIPHOSPHOLIPID ANTIBODY POSITIVE: ICD-10-CM

## 2020-12-24 DIAGNOSIS — I82.491 ACUTE DEEP VEIN THROMBOSIS (DVT) OF OTHER SPECIFIED VEIN OF RIGHT LOWER EXTREMITY (H): ICD-10-CM

## 2020-12-24 DIAGNOSIS — Z79.01 LONG TERM CURRENT USE OF ANTICOAGULANTS WITH INR GOAL OF 2.0-3.0: ICD-10-CM

## 2020-12-24 RX ORDER — WARFARIN SODIUM 5 MG/1
TABLET ORAL
Qty: 38 TABLET | Refills: 1 | Status: SHIPPED | OUTPATIENT
Start: 2020-12-24 | End: 2021-01-28

## 2020-12-24 NOTE — LETTER
January 6, 2021      Ronna Coleman  [address]  [place] [address]        Dear Ronna Coleman,    In order to ensure that we are providing the best quality care, we would like to remind you that you are due for a Physical appointment with Jose Tierney MD.      We have been unable to reach you by phone. Please call your clinic or use [employer] to make an appointment with your provider before you run out of medication. This will prevent a delay in your next refill.     You should have received a letter or email concerning your provider moving to the [facility] location at [address] in [place] on 1/11/2021. The phone number there is [phone].    Please let us know if you have any questions and we would be happy to help.   Thank you for trusting us with your care.    Sincerely,     [facility]  [phone]

## 2020-12-29 NOTE — TELEPHONE ENCOUNTER
Attempt # 1    Left message for patient stating prescription has been sent to the pharmacy. Advised patient to call clinic to schedule a Physical appointment with Jose Tierney MD at the [place] location. Clinic number given.    Yasmeen Flores  Primary Care   [facility]

## 2020-12-31 NOTE — TELEPHONE ENCOUNTER
2nd attempt, left voicemail to schedule as noted below.      Yasmeen Flores  Primary Care   [facility]

## 2021-01-05 DIAGNOSIS — E06.3 HYPOTHYROIDISM DUE TO HASHIMOTO'S THYROIDITIS: ICD-10-CM

## 2021-01-06 RX ORDER — LEVOTHYROXINE SODIUM 150 UG/1
150 TABLET ORAL DAILY
Qty: 90 TABLET | Refills: 0 | Status: SHIPPED | OUTPATIENT
Start: 2021-01-06 | End: 2021-03-23

## 2021-01-06 NOTE — TELEPHONE ENCOUNTER
Last Clinic Visit: 8/27/2019  [facility]  Past due appt/lab: NOTED ON RX, ( [facility] IN TRANSITION)  RF 90 day

## 2021-01-06 NOTE — TELEPHONE ENCOUNTER
3rd attempt, chart letter created and sent.    Yasmeen Flores  Primary Care   [facility]

## 2021-01-07 DIAGNOSIS — I82.491 ACUTE DEEP VEIN THROMBOSIS (DVT) OF OTHER SPECIFIED VEIN OF RIGHT LOWER EXTREMITY (H): ICD-10-CM

## 2021-01-07 LAB — INR PPP: 2.22 (ref 0.86–1.14)

## 2021-01-07 PROCEDURE — 36416 COLLJ CAPILLARY BLOOD SPEC: CPT | Performed by: INTERNAL MEDICINE

## 2021-01-07 PROCEDURE — 85610 PROTHROMBIN TIME: CPT | Performed by: INTERNAL MEDICINE

## 2021-01-08 ENCOUNTER — ANTICOAGULATION THERAPY VISIT (OUTPATIENT)
Dept: PEDIATRICS | Facility: CLINIC | Age: 26
End: 2021-01-08
Payer: COMMERCIAL

## 2021-01-08 DIAGNOSIS — Z79.01 LONG TERM CURRENT USE OF ANTICOAGULANTS WITH INR GOAL OF 2.0-3.0: ICD-10-CM

## 2021-01-08 DIAGNOSIS — I82.491 ACUTE DEEP VEIN THROMBOSIS (DVT) OF OTHER SPECIFIED VEIN OF RIGHT LOWER EXTREMITY (H): ICD-10-CM

## 2021-01-08 DIAGNOSIS — R76.0 ANTIPHOSPHOLIPID ANTIBODY POSITIVE: ICD-10-CM

## 2021-01-08 PROCEDURE — 99207 PR NO CHARGE NURSE ONLY: CPT | Performed by: FAMILY MEDICINE

## 2021-01-08 NOTE — PROGRESS NOTES
ANTICOAGULATION FOLLOW-UP CLINIC VISIT    Patient Name:  Ronna Coleman  Date:  2021  Contact Type:  Telephone/ DVM    SUBJECTIVE:  Patient Findings     Comments:  Unable to assess        Clinical Outcomes     Comments:  Unable to assess           OBJECTIVE    Recent labs: (last 7 days)     21  1529   INR 2.22*       ASSESSMENT / PLAN  INR assessment THER    Recheck INR In: 4 WEEKS    INR Location Clinic      Anticoagulation Summary  As of 2021    INR goal:  2.0-3.0   TTR:  42.4 % (1 y)   INR used for dosin.22 (2021)   Warfarin maintenance plan:  5 mg (5 mg x 1) every Sun, Wed; 7.5 mg (5 mg x 1.5) all other days   Full warfarin instructions:  5 mg every Sun, Wed; 7.5 mg all other days   Weekly warfarin total:  47.5 mg   No change documented:  Cece Parkinson, RN   Plan last modified:  Saeid Denise RN (12/3/2020)   Next INR check:  2021   Priority:  Maintenance   Target end date:  2030    Indications    Acute deep vein thrombosis (DVT) of other specified vein of right lower extremity (H) [I82.491]  Antiphospholipid antibody positive [R76.0]  Long term current use of anticoagulants with INR goal of 2.0-3.0 [Z79.01]             Anticoagulation Episode Summary     INR check location:  Clinic Lab    Preferred lab:      Send INR reminders to:  CHENTE SMILEY    Comments:  Unprovoked dvt  Concern for antiphospolipid antibody syndrome  Anticoagulation likely indefinite but will be reevaluated in 2019      Anticoagulation Care Providers     Provider Role Specialty Phone number    Gorge Kebede MD Referring Internal Medicine [phone]    Ameya Bacon MD Responsible Hematology & Oncology [phone]            See the Encounter Report to view Anticoagulation Flowsheet and Dosing Calendar (Go to Encounters tab in chart review, and find the Anticoagulation Therapy Visit)    The following message was left for the patient:    INR: 2.22    If there has been NO changes to  your medications, diet, health, missed/extra doses of warfarin, scheduled procedures, signs and/or symptoms of bleeding or clotting then please take your medication and recheck as recommended below:    Continue 5 mg Sun/Wed and 7.5 mg ROW. Recheck 4 week, 2/4/21.    If you have any changes, concerns, questions, or need help scheduling an appointment please contact the Anticoagulation Clinic.    Thanks,    HEBER Zhao  [facility]  [phone]        Cece Parkinson RN

## 2021-02-11 ENCOUNTER — TELEPHONE (OUTPATIENT)
Dept: FAMILY MEDICINE | Facility: CLINIC | Age: 26
End: 2021-02-11

## 2021-02-11 NOTE — LETTER
February 25, 2021      Ronna Coleman  [address]  [place] [address]      Dear Ronna,    You are currently under the care of [facility] for your warfarin (Coumadin ) therapy.  We are contacting you because our records show you were due for an INR on 2/4/2021.    There are potentially serious risks when taking warfarin without careful monitoring and we want to make sure you are safely managed.  Routine INR monitoring is required for warfarin refills.     Please call [phone] as soon as possible to schedule an appointment.  If there has been a change in your care or other concerns, please let us know so we can help and or update our records.     Sincerely,       [facility]

## 2021-02-11 NOTE — TELEPHONE ENCOUNTER
ANTICOAGULATION     Ronna Coleman is overdue for INR check.      Left message for patient to call and schedule lab appointment as soon as possible. If returning call, please schedule.     Staci Lujan RN

## 2021-02-25 DIAGNOSIS — I82.491 ACUTE DEEP VEIN THROMBOSIS (DVT) OF OTHER SPECIFIED VEIN OF RIGHT LOWER EXTREMITY (H): ICD-10-CM

## 2021-02-25 DIAGNOSIS — R76.0 ANTIPHOSPHOLIPID ANTIBODY POSITIVE: ICD-10-CM

## 2021-02-25 DIAGNOSIS — Z79.01 LONG TERM CURRENT USE OF ANTICOAGULANTS WITH INR GOAL OF 2.0-3.0: ICD-10-CM

## 2021-02-25 RX ORDER — WARFARIN SODIUM 5 MG/1
TABLET ORAL
Qty: 38 TABLET | Refills: 0 | Status: SHIPPED | OUTPATIENT
Start: 2021-02-25 | End: 2021-03-23

## 2021-02-25 NOTE — TELEPHONE ENCOUNTER
ANTICOAGULATION     Ronna Coleman is overdue for INR check.      Reminder letter sent    Staci Lujan RN

## 2021-03-05 DIAGNOSIS — I82.491 ACUTE DEEP VEIN THROMBOSIS (DVT) OF OTHER SPECIFIED VEIN OF RIGHT LOWER EXTREMITY (H): ICD-10-CM

## 2021-03-05 LAB — INR PPP: 1.76 (ref 0.86–1.14)

## 2021-03-05 PROCEDURE — 85610 PROTHROMBIN TIME: CPT | Performed by: INTERNAL MEDICINE

## 2021-03-05 PROCEDURE — 36416 COLLJ CAPILLARY BLOOD SPEC: CPT | Performed by: INTERNAL MEDICINE

## 2021-03-08 ENCOUNTER — ANTICOAGULATION THERAPY VISIT (OUTPATIENT)
Dept: NURSING | Facility: CLINIC | Age: 26
End: 2021-03-08

## 2021-03-08 DIAGNOSIS — Z79.01 LONG TERM CURRENT USE OF ANTICOAGULANTS WITH INR GOAL OF 2.0-3.0: ICD-10-CM

## 2021-03-08 DIAGNOSIS — I82.491 ACUTE DEEP VEIN THROMBOSIS (DVT) OF OTHER SPECIFIED VEIN OF RIGHT LOWER EXTREMITY (H): ICD-10-CM

## 2021-03-08 DIAGNOSIS — R76.0 ANTIPHOSPHOLIPID ANTIBODY POSITIVE: ICD-10-CM

## 2021-03-08 NOTE — PROGRESS NOTES
ANTICOAGULATION MANAGEMENT     Patient Name:  Ronna Coleman  Date:  3/8/2021    ASSESSMENT /SUBJECTIVE:    3/5/2021 INR result of 1.76 is subtherapeutic. Goal INR of 2.0-3.0      Warfarin dose taken: Warfarin taken as instructed    Diet: No new diet changes affecting INR    Medication changes/ interactions: No new medications/supplements affecting INR    Previous INR: Therapeutic     S/S of bleeding or thromboembolism: No    New injury or illness: No    Upcoming surgery, procedure or cardioversion: No    Additional findings: None      PLAN:    Telephone call with Ronna regarding INR result and instructed:     Warfarin Dosing Instructions: 12.5 mg then continue your current warfarin dose of 5 mg Sun, Wed; 7.5 mg all other days    Instructed patient to follow up no later than: 2 weeks  Lab visit scheduled    Education provided: None required      Ronna verbalizes understanding and agrees to warfarin dosing plan.    Instructed to call the Anticoagulation Clinic for any changes, questions or concerns. (#[phone])        Elizabeth Recinos RN      OBJECTIVE:  Recent labs: (last 7 days)     21  1423   INR 1.76*         INR assessment SUB    Recheck INR In: 2 WEEKS    INR Location Clinic      Anticoagulation Summary  As of 3/8/2021    INR goal:  2.0-3.0   TTR:  48.4 % (1 y)   INR used for dosin.76 (3/5/2021)   Warfarin maintenance plan:  5 mg (5 mg x 1) every Sun, Wed; 7.5 mg (5 mg x 1.5) all other days   Full warfarin instructions:  3/8: 12.5 mg; Otherwise 5 mg every Sun, Wed; 7.5 mg all other days   Weekly warfarin total:  47.5 mg   Plan last modified:  Saeid Denise, RN (12/3/2020)   Next INR check:  3/19/2021   Priority:  Maintenance   Target end date:  2030    Indications    Acute deep vein thrombosis (DVT) of other specified vein of right lower extremity (H) [I82.491]  Antiphospholipid antibody positive [R76.0]  Long term current use of anticoagulants with INR goal of 2.0-3.0 [Z79.01]              Anticoagulation Episode Summary     INR check location:  Clinic Lab    Preferred lab:      Send INR reminders to:  CHENTE SMILEY    Comments:  Unprovoked dvt  Concern for antiphospolipid antibody syndrome  Anticoagulation likely indefinite but will be reevaluated in 9/2019      Anticoagulation Care Providers     Provider Role Specialty Phone number    Gorge Kebede MD Referring Internal Medicine [phone]    Ameya Bacon MD Responsible Hematology & Oncology [phone]

## 2021-03-19 ENCOUNTER — ANTICOAGULATION THERAPY VISIT (OUTPATIENT)
Dept: NURSING | Facility: CLINIC | Age: 26
End: 2021-03-19

## 2021-03-19 DIAGNOSIS — R76.0 ANTIPHOSPHOLIPID ANTIBODY POSITIVE: ICD-10-CM

## 2021-03-19 DIAGNOSIS — I82.491 ACUTE DEEP VEIN THROMBOSIS (DVT) OF OTHER SPECIFIED VEIN OF RIGHT LOWER EXTREMITY (H): ICD-10-CM

## 2021-03-19 DIAGNOSIS — Z79.01 LONG TERM CURRENT USE OF ANTICOAGULANTS WITH INR GOAL OF 2.0-3.0: ICD-10-CM

## 2021-03-19 LAB — INR PPP: 2.07 (ref 0.86–1.14)

## 2021-03-19 PROCEDURE — 36416 COLLJ CAPILLARY BLOOD SPEC: CPT | Performed by: INTERNAL MEDICINE

## 2021-03-19 PROCEDURE — 85610 PROTHROMBIN TIME: CPT | Performed by: INTERNAL MEDICINE

## 2021-03-19 NOTE — PROGRESS NOTES
ANTICOAGULATION MANAGEMENT     Patient Name:  Ronna Coleman  Date:  3/19/2021    ASSESSMENT /SUBJECTIVE:    Today's INR result of 2.07 is therapeutic. Goal INR of 2.0-3.0      Warfarin dose taken: Warfarin taken as instructed    Diet: No new diet changes affecting INR    Medication changes/ interactions: No new medications/supplements affecting INR    Previous INR: Subtherapeutic     S/S of bleeding or thromboembolism: No    New injury or illness: No    Upcoming surgery, procedure or cardioversion: No    Additional findings: None      PLAN:    Telephone call with Ronna regarding INR result and instructed:     Warfarin Dosing Instructions: Continue your current warfarin dose 5 mg Sun,Wed; 7.5 mg all other days    Instructed patient to follow up no later than: 2 weeks  Lab visit scheduled    Education provided: None required      Ronna verbalizes understanding and agrees to warfarin dosing plan.    Instructed to call the Anticoagulation Clinic for any changes, questions or concerns. (#[phone])        Elizabeth Recinos RN      OBJECTIVE:  Recent labs: (last 7 days)     21  1002   INR 2.07*         INR assessment THER    Recheck INR In: 2 WEEKS    INR Location Clinic      Anticoagulation Summary  As of 3/19/2021    INR goal:  2.0-3.0   TTR:  48.9 % (1 y)   INR used for dosin.07 (3/19/2021)   Warfarin maintenance plan:  5 mg (5 mg x 1) every Sun, Wed; 7.5 mg (5 mg x 1.5) all other days   Full warfarin instructions:  5 mg every Sun, Wed; 7.5 mg all other days   Weekly warfarin total:  47.5 mg   No change documented:  Elizabeth Recinos RN   Plan last modified:  Saeid Denise RN (12/3/2020)   Next INR check:  2021   Priority:  Maintenance   Target end date:  2030    Indications    Acute deep vein thrombosis (DVT) of other specified vein of right lower extremity (H) [I82.491]  Antiphospholipid antibody positive [R76.0]  Long term current use of anticoagulants with INR goal of 2.0-3.0  [Z79.01]             Anticoagulation Episode Summary     INR check location:  Clinic Lab    Preferred lab:      Send INR reminders to:  CHENTE SMILEY    Comments:  Unprovoked dvt  Concern for antiphospolipid antibody syndrome  Anticoagulation likely indefinite but will be reevaluated in 9/2019      Anticoagulation Care Providers     Provider Role Specialty Phone number    Gorge Kebede MD Referring Internal Medicine [phone]    Ameya Bacon MD Responsible Hematology & Oncology [phone]

## 2021-03-21 DIAGNOSIS — E06.3 HYPOTHYROIDISM DUE TO HASHIMOTO'S THYROIDITIS: ICD-10-CM

## 2021-03-23 DIAGNOSIS — Z79.01 LONG TERM CURRENT USE OF ANTICOAGULANTS WITH INR GOAL OF 2.0-3.0: ICD-10-CM

## 2021-03-23 DIAGNOSIS — R76.0 ANTIPHOSPHOLIPID ANTIBODY POSITIVE: ICD-10-CM

## 2021-03-23 DIAGNOSIS — I82.491 ACUTE DEEP VEIN THROMBOSIS (DVT) OF OTHER SPECIFIED VEIN OF RIGHT LOWER EXTREMITY (H): ICD-10-CM

## 2021-03-23 RX ORDER — WARFARIN SODIUM 5 MG/1
TABLET ORAL
Qty: 120 TABLET | Refills: 0 | Status: SHIPPED | OUTPATIENT
Start: 2021-03-23 | End: 2021-06-17

## 2021-03-23 RX ORDER — LEVOTHYROXINE SODIUM 150 UG/1
TABLET ORAL
Qty: 90 TABLET | Refills: 0 | Status: SHIPPED | OUTPATIENT
Start: 2021-03-23 | End: 2021-04-21

## 2021-03-23 NOTE — TELEPHONE ENCOUNTER
Routing refill request to provider for review/approval because:  Marge given x1 and patient did not follow up, please advise  Labs not current:    TSH   Date Value Ref Range Status   01/03/2020 1.59 0.40 - 4.00 mU/L Final       Patient needs to be seen because it has been more than 1 year since last office visit.      Rody Fung RN

## 2021-03-23 NOTE — TELEPHONE ENCOUNTER
Refill is sent as requested.  Patient is overdue for physical and fasting labs-please help her schedule for both

## 2021-03-23 NOTE — TELEPHONE ENCOUNTER
Rx approved per [facility] protocol.    Elizabeth Recinos RN    [facility]

## 2021-03-26 DIAGNOSIS — Z79.01 LONG TERM CURRENT USE OF ANTICOAGULANTS WITH INR GOAL OF 2.0-3.0: ICD-10-CM

## 2021-03-26 DIAGNOSIS — I82.491 ACUTE DEEP VEIN THROMBOSIS (DVT) OF OTHER SPECIFIED VEIN OF RIGHT LOWER EXTREMITY (H): ICD-10-CM

## 2021-03-26 DIAGNOSIS — R76.0 ANTIPHOSPHOLIPID ANTIBODY POSITIVE: Primary | ICD-10-CM

## 2021-04-02 ENCOUNTER — ANTICOAGULATION THERAPY VISIT (OUTPATIENT)
Dept: NURSING | Facility: CLINIC | Age: 26
End: 2021-04-02

## 2021-04-02 DIAGNOSIS — I82.491 ACUTE DEEP VEIN THROMBOSIS (DVT) OF OTHER SPECIFIED VEIN OF RIGHT LOWER EXTREMITY (H): ICD-10-CM

## 2021-04-02 DIAGNOSIS — R76.0 ANTIPHOSPHOLIPID ANTIBODY POSITIVE: ICD-10-CM

## 2021-04-02 DIAGNOSIS — Z79.01 LONG TERM CURRENT USE OF ANTICOAGULANTS WITH INR GOAL OF 2.0-3.0: ICD-10-CM

## 2021-04-02 LAB — INR PPP: 2.23 (ref 0.86–1.14)

## 2021-04-02 PROCEDURE — 36416 COLLJ CAPILLARY BLOOD SPEC: CPT | Performed by: FAMILY MEDICINE

## 2021-04-02 PROCEDURE — 85610 PROTHROMBIN TIME: CPT | Performed by: FAMILY MEDICINE

## 2021-04-02 NOTE — PROGRESS NOTES
ANTICOAGULATION MANAGEMENT     Patient Name:  Ronna Coleman  Date:  2021    ASSESSMENT /SUBJECTIVE:    Today's INR result of 2.23 is therapeutic. Goal INR of 2.0-3.0      Warfarin dose taken: Warfarin taken as instructed    Diet: No new diet changes affecting INR    Medication changes/ interactions: No new medications/supplements affecting INR    Previous INR: Therapeutic     S/S of bleeding or thromboembolism: No    New injury or illness: No    Upcoming surgery, procedure or cardioversion: No    Additional findings: None      PLAN:    Telephone call with Ronna regarding INR result and instructed:     Warfarin Dosing Instructions: Continue your current warfarin dose 5 mg Mon,Wed; 7.5 mg all other days    Instructed patient to follow up no later than: 4 weeks  Lab visit scheduled    Education provided: None required      Ronna verbalizes understanding and agrees to warfarin dosing plan.    Instructed to call the Anticoagulation Clinic for any changes, questions or concerns. (#[phone])        Elizabeth Recinos RN      OBJECTIVE:  Recent labs: (last 7 days)     21  1001   INR 2.23*         INR assessment THER    Recheck INR In: 4 WEEKS    INR Location Clinic      Anticoagulation Summary  As of 2021    INR goal:  2.0-3.0   TTR:  52.7 % (1 y)   INR used for dosin.23 (2021)   Warfarin maintenance plan:  5 mg (5 mg x 1) every Sun, Wed; 7.5 mg (5 mg x 1.5) all other days   Full warfarin instructions:  5 mg every Sun, Wed; 7.5 mg all other days   Weekly warfarin total:  47.5 mg   No change documented:  Elizabeth Recinos RN   Plan last modified:  Saeid Denise RN (12/3/2020)   Next INR check:  2021   Priority:  Maintenance   Target end date:  2030    Indications    Acute deep vein thrombosis (DVT) of other specified vein of right lower extremity (H) [I82.491]  Antiphospholipid antibody positive [R76.0]  Long term current use of anticoagulants with INR goal of 2.0-3.0  [Z79.01]             Anticoagulation Episode Summary     INR check location:  Clinic Lab    Preferred lab:      Send INR reminders to:  CHENTE SMILEY    Comments:  Unprovoked dvt  Concern for antiphospolipid antibody syndrome  Anticoagulation likely indefinite but will be reevaluated in 9/2019      Anticoagulation Care Providers     Provider Role Specialty Phone number    Gorge Kebede MD Referring Internal Medicine [phone]    Ameya Bacon MD Responsible Hematology & Oncology [phone]

## 2021-04-17 ENCOUNTER — HEALTH MAINTENANCE LETTER (OUTPATIENT)
Age: 26
End: 2021-04-17

## 2021-04-21 DIAGNOSIS — E06.3 HYPOTHYROIDISM DUE TO HASHIMOTO'S THYROIDITIS: ICD-10-CM

## 2021-04-21 RX ORDER — LEVOTHYROXINE SODIUM 150 UG/1
150 TABLET ORAL DAILY
Qty: 30 TABLET | Refills: 0 | Status: SHIPPED | OUTPATIENT
Start: 2021-04-21 | End: 2024-09-27

## 2021-04-21 NOTE — TELEPHONE ENCOUNTER
I have not seen patient since August 2019   she is way overdue for physical and med recheck  30-day supply sent  This was relayed with the previous refills  Moreover patient sees endocrinology for her type 1 diabetes, she should be able to get the levothyroxine from them

## 2021-04-21 NOTE — TELEPHONE ENCOUNTER
Routing refill request to provider for review/approval because:  Labs not current:  TSH    Keri Chawla BSN, RN

## 2021-04-30 ENCOUNTER — ANTICOAGULATION THERAPY VISIT (OUTPATIENT)
Dept: NURSING | Facility: CLINIC | Age: 26
End: 2021-04-30

## 2021-04-30 DIAGNOSIS — I82.491 ACUTE DEEP VEIN THROMBOSIS (DVT) OF OTHER SPECIFIED VEIN OF RIGHT LOWER EXTREMITY (H): ICD-10-CM

## 2021-04-30 DIAGNOSIS — Z79.01 LONG TERM CURRENT USE OF ANTICOAGULANTS WITH INR GOAL OF 2.0-3.0: ICD-10-CM

## 2021-04-30 DIAGNOSIS — R76.0 ANTIPHOSPHOLIPID ANTIBODY POSITIVE: ICD-10-CM

## 2021-04-30 LAB — INR PPP: 2.26 (ref 0.86–1.14)

## 2021-04-30 PROCEDURE — 36416 COLLJ CAPILLARY BLOOD SPEC: CPT | Performed by: FAMILY MEDICINE

## 2021-04-30 PROCEDURE — 85610 PROTHROMBIN TIME: CPT | Performed by: FAMILY MEDICINE

## 2021-04-30 NOTE — PROGRESS NOTES
ANTICOAGULATION MANAGEMENT     Patient Name:  Ronna Coleman  Date:  2021    ASSESSMENT /SUBJECTIVE:    Today's INR result of 2.26 is therapeutic. Goal INR of 2.0-3.0      Warfarin dose taken: Warfarin taken as instructed    Diet: No new diet changes affecting INR    Medication changes/ interactions: No new medications/supplements affecting INR    Previous INR: Therapeutic     S/S of bleeding or thromboembolism: No    New injury or illness: No    Upcoming surgery, procedure or cardioversion: No    Additional findings: None      PLAN:    Telephone call with Ronna regarding INR result and instructed:     Warfarin Dosing Instructions: Continue your current warfarin dose 5 mg Sun,Wed; 7.5 mg all other days    Instructed patient to follow up no later than: 4 weeks  Lab visit scheduled    Education provided: None required      Ronna verbalizes understanding and agrees to warfarin dosing plan.    Instructed to call the Anticoagulation Clinic for any changes, questions or concerns. (#[phone])        Elizabeth Recinos RN      OBJECTIVE:  Recent labs: (last 7 days)     21  1004   INR 2.26*         INR assessment THER    Recheck INR In: 4 WEEKS    INR Location Clinic      Anticoagulation Summary  As of 2021    INR goal:  2.0-3.0   TTR:  56.8 % (1 y)   INR used for dosin.26 (2021)   Warfarin maintenance plan:  5 mg (5 mg x 1) every Sun, Wed; 7.5 mg (5 mg x 1.5) all other days   Full warfarin instructions:  5 mg every Sun, Wed; 7.5 mg all other days   Weekly warfarin total:  47.5 mg   No change documented:  Elizabeth Recinos RN   Plan last modified:  Saeid Denise RN (12/3/2020)   Next INR check:  2021   Priority:  Maintenance   Target end date:  2030    Indications    Acute deep vein thrombosis (DVT) of other specified vein of right lower extremity (H) [I82.491]  Antiphospholipid antibody positive [R76.0]  Long term current use of anticoagulants with INR goal of 2.0-3.0  [Z79.01]             Anticoagulation Episode Summary     INR check location:  Clinic Lab    Preferred lab:      Send INR reminders to:  CHENTE SMILEY    Comments:  Unprovoked dvt  Concern for antiphospolipid antibody syndrome  Anticoagulation likely indefinite but will be reevaluated in 9/2019      Anticoagulation Care Providers     Provider Role Specialty Phone number    Gorge Kebede MD Referring Internal Medicine [phone]    Ameya Bacon MD Responsible Hematology & Oncology [phone]

## 2021-05-28 ENCOUNTER — ANTICOAGULATION THERAPY VISIT (OUTPATIENT)
Dept: NURSING | Facility: CLINIC | Age: 26
End: 2021-05-28

## 2021-05-28 ENCOUNTER — TELEPHONE (OUTPATIENT)
Dept: FAMILY MEDICINE | Facility: CLINIC | Age: 26
End: 2021-05-28

## 2021-05-28 DIAGNOSIS — Z79.01 LONG TERM CURRENT USE OF ANTICOAGULANTS WITH INR GOAL OF 2.0-3.0: ICD-10-CM

## 2021-05-28 DIAGNOSIS — R76.0 ANTIPHOSPHOLIPID ANTIBODY POSITIVE: Primary | ICD-10-CM

## 2021-05-28 DIAGNOSIS — I82.491 ACUTE DEEP VEIN THROMBOSIS (DVT) OF OTHER SPECIFIED VEIN OF RIGHT LOWER EXTREMITY (H): ICD-10-CM

## 2021-05-28 DIAGNOSIS — R76.0 ANTIPHOSPHOLIPID ANTIBODY POSITIVE: ICD-10-CM

## 2021-05-28 LAB
CAPILLARY BLOOD COLLECTION: NORMAL
INR PPP: 2.88 (ref 0.86–1.14)

## 2021-05-28 PROCEDURE — 36416 COLLJ CAPILLARY BLOOD SPEC: CPT | Performed by: FAMILY MEDICINE

## 2021-05-28 PROCEDURE — 85610 PROTHROMBIN TIME: CPT | Performed by: FAMILY MEDICINE

## 2021-05-28 NOTE — PROGRESS NOTES
ANTICOAGULATION MANAGEMENT     Patient Name:  Ronna Coleman  Date:  2021    ASSESSMENT /SUBJECTIVE:    Today's INR result of 2.88 is therapeutic. Goal INR of 2.0-3.0      Left detailed message with dosing and recheck informed to call if changes. Will send MyChart with the questions.      PLAN:    Detailed voice message left for Ronna with dosing instructions and follow up date.  regarding INR result and instructed:     Warfarin Dosing Instructions: Continue your current warfarin dose 5 mg Sun,Wed; 7.5 mg all other days    Instructed patient to follow up no later than: 4 weeks  Left detailed message with recommended recheck date    Education provided: N/A      Instructed to call the Anticoagulation Clinic for any changes, questions or concerns. (#[phone])        Elizabeth Recinos RN      OBJECTIVE:  Recent labs: (last 7 days)     21  1002   INR 2.88*         INR assessment THER    Recheck INR In: 4 WEEKS    INR Location Clinic      Anticoagulation Summary  As of 2021    INR goal:  2.0-3.0   TTR:  61.3 % (1 y)   INR used for dosin.88 (2021)   Warfarin maintenance plan:  5 mg (5 mg x 1) every Sun, Wed; 7.5 mg (5 mg x 1.5) all other days   Full warfarin instructions:  5 mg every Sun, Wed; 7.5 mg all other days   Weekly warfarin total:  47.5 mg   No change documented:  Elizabeth Recinos RN   Plan last modified:  Saeid Denise RN (12/3/2020)   Next INR check:  2021   Priority:  Maintenance   Target end date:  2030    Indications    Acute deep vein thrombosis (DVT) of other specified vein of right lower extremity (H) [I82.491]  Antiphospholipid antibody positive [R76.0]  Long term current use of anticoagulants with INR goal of 2.0-3.0 [Z79.01]             Anticoagulation Episode Summary     INR check location:  Clinic Lab    Preferred lab:      Send INR reminders to:  CHENTE SMILEY    Comments:  Unprovoked dvt  Concern for antiphospolipid antibody  syndrome  Anticoagulation likely indefinite but will be reevaluated in 9/2019      Anticoagulation Care Providers     Provider Role Specialty Phone number    Gorge Kebede MD Referring Internal Medicine [phone]    Ameya Bacon MD Responsible Hematology & Oncology [phone]

## 2021-05-28 NOTE — TELEPHONE ENCOUNTER
ANTICOAGULATION MANAGEMENT      Ronna Coleman due for annual renewal of referral to anticoagulation monitoring. Order pended for your review and signature.      ANTICOAGULATION SUMMARY      Warfarin indication(s)     antiphospholipid antibody positive, acute deep vein thrombosis    Heart valve present?  NO       Current goal range   INR: 2.0-3.0     Goal appropriate for indication? Yes, INR 2-3 appropriate for hx of DVT, PE, hypercoagulable state, Afib, LVAD, or bileaflet AVR without risk factors     Current duration of therapy Indefinite/long term therapy   Time in Therapeutic Range (TTR)  (Goal > 60%) 61.3 %       Office visit with referring provider's group within last year no on 8/27/2019       Elizabeth Recinos RN

## 2021-06-17 DIAGNOSIS — Z79.01 LONG TERM CURRENT USE OF ANTICOAGULANTS WITH INR GOAL OF 2.0-3.0: ICD-10-CM

## 2021-06-17 DIAGNOSIS — I82.491 ACUTE DEEP VEIN THROMBOSIS (DVT) OF OTHER SPECIFIED VEIN OF RIGHT LOWER EXTREMITY (H): ICD-10-CM

## 2021-06-17 DIAGNOSIS — R76.0 ANTIPHOSPHOLIPID ANTIBODY POSITIVE: ICD-10-CM

## 2021-06-17 RX ORDER — WARFARIN SODIUM 5 MG/1
TABLET ORAL
Qty: 120 TABLET | Refills: 0 | Status: SHIPPED | OUTPATIENT
Start: 2021-06-17 | End: 2021-09-14

## 2021-07-02 ENCOUNTER — TELEPHONE (OUTPATIENT)
Dept: FAMILY MEDICINE | Facility: CLINIC | Age: 26
End: 2021-07-02

## 2021-07-02 NOTE — TELEPHONE ENCOUNTER
ANTICOAGULATION     Ronna Coleman is overdue for INR check.      Left message for patient to call and schedule lab appointment as soon as possible. If returning call, please schedule.     Anna Turner RN

## 2021-07-12 ENCOUNTER — TELEPHONE (OUTPATIENT)
Dept: FAMILY MEDICINE | Facility: CLINIC | Age: 26
End: 2021-07-12

## 2021-07-12 NOTE — TELEPHONE ENCOUNTER
ANTICOAGULATION     Ronna Coleman is overdue for INR check.      Left message for patient to call and schedule lab appointment as soon as possible. If returning call, please schedule.  and Reminder letter sent via [employer].    Elizabeth Recinos RN

## 2021-07-20 ENCOUNTER — TELEPHONE (OUTPATIENT)
Dept: FAMILY MEDICINE | Facility: CLINIC | Age: 26
End: 2021-07-20

## 2021-07-20 NOTE — TELEPHONE ENCOUNTER
ANTICOAGULATION  Outreach #3    Ronna Coleman is overdue for INR check.      Left message for patient to call and schedule lab appointment as soon as possible. If returning call, please schedule.     Elizabeth Recinos RN

## 2021-08-01 ENCOUNTER — HEALTH MAINTENANCE LETTER (OUTPATIENT)
Age: 26
End: 2021-08-01

## 2021-08-03 ENCOUNTER — TELEPHONE (OUTPATIENT)
Dept: FAMILY MEDICINE | Facility: CLINIC | Age: 26
End: 2021-08-03

## 2021-08-03 NOTE — LETTER
Ronna Coleman  [address] [place] [address]  [id number]    Julio Cesar Friend,    You are currently under the care of [facility] for your warfarin (Coumadin ) therapy.  We are contacting you because our records show you were due for an INR on 06/25/2021.    There are potentially serious risks when taking warfarin without careful monitoring and we want to make sure you are safely managed.  Routine INR monitoring is required for warfarin refills.     Please call [phone] as soon as possible to schedule an appointment.  If there has been a change in your care or other concerns, please let us know so we can help and or update our records.     Sincerely,       [facility]

## 2021-08-03 NOTE — TELEPHONE ENCOUNTER
ANTICOAGULATION     Ronna Coleman is overdue for INR check.      Left message for patient to call and schedule lab appointment as soon as possible. If returning call, please schedule.      Letter mailed to the patient's home address on file.      Elizabeth Recinos RN

## 2021-08-10 DIAGNOSIS — I82.491 ACUTE DEEP VEIN THROMBOSIS (DVT) OF OTHER SPECIFIED VEIN OF RIGHT LOWER EXTREMITY (H): ICD-10-CM

## 2021-08-10 DIAGNOSIS — R76.0 ANTIPHOSPHOLIPID ANTIBODY POSITIVE: Primary | ICD-10-CM

## 2021-08-10 NOTE — PROGRESS NOTES
Update INR orders.    Elizabteh Recinos RN    [facility]tico[facility]

## 2021-08-24 ENCOUNTER — LAB (OUTPATIENT)
Dept: LAB | Facility: CLINIC | Age: 26
End: 2021-08-24
Payer: COMMERCIAL

## 2021-08-24 DIAGNOSIS — I82.491 ACUTE DEEP VEIN THROMBOSIS (DVT) OF OTHER SPECIFIED VEIN OF RIGHT LOWER EXTREMITY (H): ICD-10-CM

## 2021-08-24 DIAGNOSIS — R76.0 ANTIPHOSPHOLIPID ANTIBODY POSITIVE: ICD-10-CM

## 2021-08-24 LAB — INR PPP: 2.21 (ref 0.85–1.15)

## 2021-08-24 PROCEDURE — 36415 COLL VENOUS BLD VENIPUNCTURE: CPT

## 2021-08-24 PROCEDURE — 85610 PROTHROMBIN TIME: CPT

## 2021-08-25 ENCOUNTER — ANTICOAGULATION THERAPY VISIT (OUTPATIENT)
Dept: FAMILY MEDICINE | Facility: CLINIC | Age: 26
End: 2021-08-25

## 2021-08-25 DIAGNOSIS — I82.491 ACUTE DEEP VEIN THROMBOSIS (DVT) OF OTHER SPECIFIED VEIN OF RIGHT LOWER EXTREMITY (H): Primary | ICD-10-CM

## 2021-08-25 DIAGNOSIS — R76.0 ANTIPHOSPHOLIPID ANTIBODY POSITIVE: ICD-10-CM

## 2021-08-25 DIAGNOSIS — Z79.01 LONG TERM CURRENT USE OF ANTICOAGULANTS WITH INR GOAL OF 2.0-3.0: ICD-10-CM

## 2021-08-25 NOTE — PROGRESS NOTES
Anticoagulation Management    Unable to reach Ronna today.    Today's INR result of 2.21 is therapeutic (goal INR of 2.0-3.0).  Result received from: Clinic Lab    Follow up required to confirm warfarin dose taken and assess for changes    No instructions provided. Unable to leave voicemail. VM not set up.    Tentative plan: If no problem findings continue maintenance dose and recheck in 5 weeks.    Anticoagulation clinic to follow up    Cece Parkinson RN

## 2021-08-25 NOTE — PROGRESS NOTES
ANTICOAGULATION MANAGEMENT     Ronna Coleman 25 year old female is on warfarin with therapeutic INR result. (Goal INR 2.0-3.0)    Recent labs: (last 7 days)     08/24/21  1553   INR 2.21*       ASSESSMENT     Source(s): Chart Review and Patient/Caregiver Call       Warfarin doses taken: Warfarin taken as instructed    Diet: No new diet changes identified    New illness, injury, or hospitalization: No    Medication/supplement changes: None noted    Signs or symptoms of bleeding or clotting: No    Previous INR: Therapeutic last 2(+) visits    Additional findings: None     PLAN     Recommended plan for no diet, medication or health factor changes affecting INR     Dosing Instructions: Continue your current warfarin dose with next INR in 5 weeks       Summary  As of 8/25/2021    Full warfarin instructions:  5 mg every Sun, Wed; 7.5 mg all other days   Next INR check:  9/28/2021             Telephone call with Ronna who verbalizes understanding and agrees to plan    Lab visit scheduled    Education provided: None required    Plan made per [facility] anticoagulation protocol    Cece Parkinson RN  Anticoagulation Clinic  8/25/2021    _______________________________________________________________________     Anticoagulation Episode Summary     Current INR goal:  2.0-3.0   TTR:  66.3 % (1 y)   Target end date:  6/19/2030   Send INR reminders to:  CHENTE SMILEY    Indications    Acute deep vein thrombosis (DVT) of other specified vein of right lower extremity (H) [I82.491]  Antiphospholipid antibody positive [R76.0]  Long term current use of anticoagulants with INR goal of 2.0-3.0 [Z79.01]           Comments:  Unprovoked dvt  Concern for antiphospolipid antibody syndrome  Anticoagulation likely indefinite but will be reevaluated in 9/2019         Anticoagulation Care Providers     Provider Role Specialty Phone number    Gorge Kebede MD Referring Internal Medicine [phone]    Jose Tierney MD Referring  [facility] [phone]    Ameya Bacon MD [facility] Hematology & Oncology [phone]

## 2021-09-14 DIAGNOSIS — I82.491 ACUTE DEEP VEIN THROMBOSIS (DVT) OF OTHER SPECIFIED VEIN OF RIGHT LOWER EXTREMITY (H): ICD-10-CM

## 2021-09-14 DIAGNOSIS — R76.0 ANTIPHOSPHOLIPID ANTIBODY POSITIVE: ICD-10-CM

## 2021-09-14 DIAGNOSIS — Z79.01 LONG TERM CURRENT USE OF ANTICOAGULANTS WITH INR GOAL OF 2.0-3.0: ICD-10-CM

## 2021-09-14 RX ORDER — WARFARIN SODIUM 5 MG/1
TABLET ORAL
Qty: 120 TABLET | Refills: 0 | Status: SHIPPED | OUTPATIENT
Start: 2021-09-14 | End: 2021-12-07

## 2021-09-26 ENCOUNTER — HEALTH MAINTENANCE LETTER (OUTPATIENT)
Age: 26
End: 2021-09-26

## 2021-09-28 ENCOUNTER — LAB (OUTPATIENT)
Dept: LAB | Facility: CLINIC | Age: 26
End: 2021-09-28
Payer: COMMERCIAL

## 2021-09-28 ENCOUNTER — ANTICOAGULATION THERAPY VISIT (OUTPATIENT)
Dept: ANTICOAGULATION | Facility: CLINIC | Age: 26
End: 2021-09-28

## 2021-09-28 DIAGNOSIS — R76.0 ANTIPHOSPHOLIPID ANTIBODY POSITIVE: ICD-10-CM

## 2021-09-28 DIAGNOSIS — I82.491 ACUTE DEEP VEIN THROMBOSIS (DVT) OF OTHER SPECIFIED VEIN OF RIGHT LOWER EXTREMITY (H): Primary | ICD-10-CM

## 2021-09-28 DIAGNOSIS — Z79.01 LONG TERM CURRENT USE OF ANTICOAGULANTS WITH INR GOAL OF 2.0-3.0: ICD-10-CM

## 2021-09-28 DIAGNOSIS — I82.491 ACUTE DEEP VEIN THROMBOSIS (DVT) OF OTHER SPECIFIED VEIN OF RIGHT LOWER EXTREMITY (H): ICD-10-CM

## 2021-09-28 LAB — INR PPP: 2.76 (ref 0.85–1.15)

## 2021-09-28 PROCEDURE — 36415 COLL VENOUS BLD VENIPUNCTURE: CPT

## 2021-09-28 PROCEDURE — 85610 PROTHROMBIN TIME: CPT

## 2021-09-28 NOTE — PROGRESS NOTES
Anticoagulation Management    Unable to reach Ronna today.    Today's INR result of 2.76 is therapeutic (goal INR of 2.0-3.0).  Result received from: Clinic Lab    Follow up required to confirm warfarin dose taken and assess for changes    [facility]      Anticoagulation clinic to follow up    Elizabeth Recinos RN  [phone]

## 2021-09-28 NOTE — PROGRESS NOTES
Attempt 2 to contact the patient. Did also send Mychart with the questions.    Elizabeth Recinos RN    [facility]ticoagulation [facility]

## 2021-09-29 NOTE — PROGRESS NOTES
ANTICOAGULATION MANAGEMENT     Ronna Coleman 25 year old female is on warfarin with therapeutic INR result. (Goal INR 2.0-3.0)    Recent labs: (last 7 days)     09/28/21  0920   INR 2.76*       ASSESSMENT     Source(s): Chart Review and Patient/Caregiver Call       Warfarin doses taken: Warfarin taken as instructed    Diet: No new diet changes identified    New illness, injury, or hospitalization: No    Medication/supplement changes: None noted    Signs or symptoms of bleeding or clotting: No    Previous INR: Therapeutic last 2(+) visits    Additional findings: None     PLAN     Recommended plan for no diet, medication or health factor changes affecting INR     Dosing Instructions: Continue your current warfarin dose with next INR in 4 weeks       Summary  As of 9/28/2021    Full warfarin instructions:  5 mg every Sun, Wed; 7.5 mg all other days   Next INR check:  10/26/2021             Telephone call with Ronna who verbalizes understanding and agrees to plan    Lab visit scheduled    Education provided: Please call back if any changes to your diet, medications or how you've been taking warfarin and Contact [phone]  with any changes, questions or concerns.     Plan made per [facility] anticoagulation protocol    Robyn Gonzalez RN  Anticoagulation Clinic  9/29/2021    _______________________________________________________________________     Anticoagulation Episode Summary     Current INR goal:  2.0-3.0   TTR:  75.9 % (1 y)   Target end date:  6/19/2030   Send INR reminders to:  CHENTE SMILEY    Indications    Acute deep vein thrombosis (DVT) of other specified vein of right lower extremity (H) [I82.491]  Antiphospholipid antibody positive [R76.0]  Long term current use of anticoagulants with INR goal of 2.0-3.0 [Z79.01]           Comments:  Unprovoked dvt  Concern for antiphospolipid antibody syndrome  Anticoagulation likely indefinite but will be reevaluated in 9/2019         Anticoagulation Care  Providers     Provider Role Specialty Phone number    Gorge Kebede MD Referring Internal Medicine [phone]    Jose Tierney MD Referring Family Medicine [phone]    Ameya Bacon MD Responsible Hematology & Oncology [phone]

## 2021-10-26 ENCOUNTER — LAB (OUTPATIENT)
Dept: LAB | Facility: CLINIC | Age: 26
End: 2021-10-26
Payer: COMMERCIAL

## 2021-10-26 ENCOUNTER — ANTICOAGULATION THERAPY VISIT (OUTPATIENT)
Dept: ANTICOAGULATION | Facility: CLINIC | Age: 26
End: 2021-10-26

## 2021-10-26 DIAGNOSIS — I82.491 ACUTE DEEP VEIN THROMBOSIS (DVT) OF OTHER SPECIFIED VEIN OF RIGHT LOWER EXTREMITY (H): ICD-10-CM

## 2021-10-26 DIAGNOSIS — I82.491 ACUTE DEEP VEIN THROMBOSIS (DVT) OF OTHER SPECIFIED VEIN OF RIGHT LOWER EXTREMITY (H): Primary | ICD-10-CM

## 2021-10-26 DIAGNOSIS — R76.0 ANTIPHOSPHOLIPID ANTIBODY POSITIVE: ICD-10-CM

## 2021-10-26 DIAGNOSIS — Z79.01 LONG TERM CURRENT USE OF ANTICOAGULANTS WITH INR GOAL OF 2.0-3.0: ICD-10-CM

## 2021-10-26 LAB — INR PPP: 2.71 (ref 0.85–1.15)

## 2021-10-26 PROCEDURE — 36415 COLL VENOUS BLD VENIPUNCTURE: CPT

## 2021-10-26 PROCEDURE — 85610 PROTHROMBIN TIME: CPT

## 2021-10-26 NOTE — PROGRESS NOTES
ANTICOAGULATION MANAGEMENT     Ronna Coleman 25 year old female is on warfarin with therapeutic INR result. (Goal INR 2.0-3.0)    Recent labs: (last 7 days)     10/26/21  0908   INR 2.71*       ASSESSMENT     Source(s): Chart Review and Patient/Caregiver Call       Warfarin doses taken: Warfarin taken as instructed    Diet: No new diet changes identified    New illness, injury, or hospitalization: No    Medication/supplement changes: None noted    Signs or symptoms of bleeding or clotting: No    Previous INR: Therapeutic last 2(+) visits    Additional findings: None     PLAN     Recommended plan for no diet, medication or health factor changes affecting INR     Dosing Instructions: Continue your current warfarin dose with next INR in 5 weeks       Summary  As of 10/26/2021    Full warfarin instructions:  5 mg every Sun, Wed; 7.5 mg all other days   Next INR check:  11/30/2021             Telephone call with Ronna who verbalizes understanding and agrees to plan    Lab visit scheduled    Education provided: None required    Plan made per [facility] anticoagulation protocol    Elizabeth Recinos RN  Anticoagulation Clinic  10/26/2021    _______________________________________________________________________     Anticoagulation Episode Summary     Current INR goal:  2.0-3.0   TTR:  83.3 % (1 y)   Target end date:  6/19/2030   Send INR reminders to:  CHENTE SMILEY    Indications    Acute deep vein thrombosis (DVT) of other specified vein of right lower extremity (H) [I82.491]  Antiphospholipid antibody positive [R76.0]  Long term current use of anticoagulants with INR goal of 2.0-3.0 [Z79.01]           Comments:  Unprovoked dvt  Concern for antiphospolipid antibody syndrome  Anticoagulation likely indefinite but will be reevaluated in 9/2019         Anticoagulation Care Providers     Provider Role Specialty Phone number    Gorge Kebede MD Referring Internal Medicine [phone]    Jose Tierney MD  [facility] [phone]    Ameya Bacon MD [facility] Hematology & Oncology [phone]

## 2021-11-21 ENCOUNTER — HEALTH MAINTENANCE LETTER (OUTPATIENT)
Age: 26
End: 2021-11-21

## 2021-11-30 ENCOUNTER — LAB (OUTPATIENT)
Dept: LAB | Facility: CLINIC | Age: 26
End: 2021-11-30
Payer: COMMERCIAL

## 2021-11-30 ENCOUNTER — ANTICOAGULATION THERAPY VISIT (OUTPATIENT)
Dept: ANTICOAGULATION | Facility: CLINIC | Age: 26
End: 2021-11-30

## 2021-11-30 DIAGNOSIS — R76.0 ANTIPHOSPHOLIPID ANTIBODY POSITIVE: ICD-10-CM

## 2021-11-30 DIAGNOSIS — I82.491 ACUTE DEEP VEIN THROMBOSIS (DVT) OF OTHER SPECIFIED VEIN OF RIGHT LOWER EXTREMITY (H): ICD-10-CM

## 2021-11-30 DIAGNOSIS — Z79.01 LONG TERM CURRENT USE OF ANTICOAGULANTS WITH INR GOAL OF 2.0-3.0: ICD-10-CM

## 2021-11-30 DIAGNOSIS — I82.491 ACUTE DEEP VEIN THROMBOSIS (DVT) OF OTHER SPECIFIED VEIN OF RIGHT LOWER EXTREMITY (H): Primary | ICD-10-CM

## 2021-11-30 LAB — INR PPP: 2.55 (ref 0.85–1.15)

## 2021-11-30 PROCEDURE — 36415 COLL VENOUS BLD VENIPUNCTURE: CPT

## 2021-11-30 PROCEDURE — 85610 PROTHROMBIN TIME: CPT

## 2021-11-30 NOTE — PROGRESS NOTES
ANTICOAGULATION MANAGEMENT     Ronna JAIME Taty 25 year old female is on warfarin with therapeutic INR result. (Goal INR 2.0-3.0)    Recent labs: (last 7 days)     11/30/21  0903   INR 2.55*       ASSESSMENT     Source(s): Chart Review and Patient/Caregiver Call       Warfarin doses taken: Warfarin taken as instructed    Diet: No new diet changes identified    New illness, injury, or hospitalization: Yes: Covid month ago    Medication/supplement changes: None noted    Signs or symptoms of bleeding or clotting: No    Previous INR: Therapeutic last 2(+) visits    Additional findings: None     PLAN     Recommended plan for no diet, medication or health factor changes affecting INR     Dosing Instructions: Continue your current warfarin dose with next INR in 4 weeks       Summary  As of 11/30/2021    Full warfarin instructions:  5 mg every Sun, Wed; 7.5 mg all other days   Next INR check:  12/28/2021             Telephone call with Ronna who verbalizes understanding and agrees to plan    Lab visit scheduled    Education provided: None required    Plan made per [facility] anticoagulation protocol    Elizabeth Recinos, RN  Anticoagulation Clinic  11/30/2021

## 2021-11-30 NOTE — PROGRESS NOTES
ANTICOAGULATION MANAGEMENT     Ronna Coleman 25 year old female is on warfarin with therapeutic INR result. (Goal INR 2.0-3.0)    Recent labs: (last 7 days)     11/30/21  0903   INR 2.55*       ASSESSMENT       Source(s): left detailed message for the patient to call if changes. Details left on questions.     PLAN     Recommended plan for no diet, medication or health factor changes affecting INR     Dosing Instructions: Continue your current warfarin dose with next INR in 4 weeks       Summary  As of 11/30/2021    Full warfarin instructions:  5 mg every Sun, Wed; 7.5 mg all other days   Next INR check:  12/28/2021             Detailed voice message left for Ronna with dosing instructions and follow up date.     Contact [phone]  to schedule and with any changes, questions or concerns.     Education provided: None required    Plan made per [facility] anticoagulation protocol    Elizabeth Recinos RN  Anticoagulation Clinic  11/30/2021    _______________________________________________________________________     Anticoagulation Episode Summary     Current INR goal:  2.0-3.0   TTR:  87.1 % (1 y)   Target end date:  6/19/2030   Send INR reminders to:  CHENTE SMILEY    Indications    Acute deep vein thrombosis (DVT) of other specified vein of right lower extremity (H) [I82.491]  Antiphospholipid antibody positive [R76.0]  Long term current use of anticoagulants with INR goal of 2.0-3.0 [Z79.01]           Comments:  Unprovoked dvt  Concern for antiphospolipid antibody syndrome  Anticoagulation likely indefinite but will be reevaluated in 9/2019         Anticoagulation Care Providers     Provider Role Specialty Phone number    Gorge Kebede MD Referring Internal Medicine [phone]    Jose Tierney MD Referring Family Medicine [phone]    Ameya Bacon MD Responsible Hematology & Oncology [phone]

## 2021-12-07 DIAGNOSIS — I82.491 ACUTE DEEP VEIN THROMBOSIS (DVT) OF OTHER SPECIFIED VEIN OF RIGHT LOWER EXTREMITY (H): ICD-10-CM

## 2021-12-07 DIAGNOSIS — Z79.01 LONG TERM CURRENT USE OF ANTICOAGULANTS WITH INR GOAL OF 2.0-3.0: ICD-10-CM

## 2021-12-07 DIAGNOSIS — R76.0 ANTIPHOSPHOLIPID ANTIBODY POSITIVE: ICD-10-CM

## 2021-12-07 RX ORDER — WARFARIN SODIUM 5 MG/1
TABLET ORAL
Qty: 120 TABLET | Refills: 1 | Status: SHIPPED | OUTPATIENT
Start: 2021-12-07 | End: 2022-03-07

## 2021-12-07 NOTE — TELEPHONE ENCOUNTER
Rx approved per [facility] protocol.    Elizabeth Recinos RN    [facility] Anticoagulation [facility]

## 2021-12-28 ENCOUNTER — LAB (OUTPATIENT)
Dept: LAB | Facility: CLINIC | Age: 26
End: 2021-12-28
Payer: COMMERCIAL

## 2021-12-28 ENCOUNTER — ANTICOAGULATION THERAPY VISIT (OUTPATIENT)
Dept: ANTICOAGULATION | Facility: CLINIC | Age: 26
End: 2021-12-28

## 2021-12-28 DIAGNOSIS — R76.0 ANTIPHOSPHOLIPID ANTIBODY POSITIVE: ICD-10-CM

## 2021-12-28 DIAGNOSIS — Z79.01 LONG TERM CURRENT USE OF ANTICOAGULANTS WITH INR GOAL OF 2.0-3.0: ICD-10-CM

## 2021-12-28 DIAGNOSIS — I82.491 ACUTE DEEP VEIN THROMBOSIS (DVT) OF OTHER SPECIFIED VEIN OF RIGHT LOWER EXTREMITY (H): ICD-10-CM

## 2021-12-28 DIAGNOSIS — I82.491 ACUTE DEEP VEIN THROMBOSIS (DVT) OF OTHER SPECIFIED VEIN OF RIGHT LOWER EXTREMITY (H): Primary | ICD-10-CM

## 2021-12-28 LAB — INR PPP: 4.41 (ref 0.85–1.15)

## 2021-12-28 PROCEDURE — 85610 PROTHROMBIN TIME: CPT

## 2021-12-28 PROCEDURE — 36415 COLL VENOUS BLD VENIPUNCTURE: CPT

## 2021-12-28 NOTE — PROGRESS NOTES
Anticoagulation Management    Unable to reach Ronna today.    Today's INR result of 4.41 is supratherapeutic (goal INR of 2.0-3.0).  Result received from: Clinic Lab    Follow up required to confirm warfarin dose taken and assess for changes    [facility]      Anticoagulation clinic to follow up    Elizabeth Recinos RN  [phone]

## 2021-12-29 NOTE — PROGRESS NOTES
ANTICOAGULATION MANAGEMENT     Ronan JAIME Taty 26 year old female is on warfarin with supratherapeutic INR result. (Goal INR 2.0-3.0)    Recent labs: (last 7 days)     12/28/21  1003   INR 4.41*       ASSESSMENT     Source(s): Chart Review and Patient/Caregiver Call       Warfarin doses taken: Warfarin taken as instructed    Diet: No new diet changes identified    New illness, injury, or hospitalization: Yes: Covid beginning of November.      Medication/supplement changes: None noted    Signs or symptoms of bleeding or clotting: No    Previous INR: Therapeutic last 2(+) visits    Additional findings: None     PLAN     Recommended plan for no diet, medication or health factor changes affecting INR     Dosing Instructions: Hold dose then continue your current warfarin dose with next INR in 10 days. Patient has been in range on this dosing since March of 2021.      Summary  As of 12/28/2021    Full warfarin instructions:  12/29: Hold; Otherwise 5 mg every Sun, Wed; 7.5 mg all other days   Next INR check:  1/7/2022             Telephone call with Ronna who verbalizes understanding and agrees to plan    Lab visit scheduled    Education provided: None required    Plan made per [facility] anticoagulation protocol    Elizabeth Recinos, RN  Anticoagulation Clinic  12/29/2021    _______________________________________________________________________     Anticoagulation Episode Summary     Current INR goal:  2.0-3.0   TTR:  83.1 % (1 y)   Target end date:  6/19/2030   Send INR reminders to:  CHENTE SMILEY    Indications    Acute deep vein thrombosis (DVT) of other specified vein of right lower extremity (H) [I82.491]  Antiphospholipid antibody positive [R76.0]  Long term current use of anticoagulants with INR goal of 2.0-3.0 [Z79.01]           Comments:  Unprovoked dvt  Concern for antiphospolipid antibody syndrome  Anticoagulation likely indefinite but will be reevaluated in 9/2019         Anticoagulation Care  Providers     Provider Role Specialty Phone number    Gorge Kebede MD Referring Internal Medicine [phone]    Jose Tierney MD Referring Family Medicine [phone]    Ameya Bacon MD Responsible Hematology & Oncology [phone]

## 2021-12-29 NOTE — PROGRESS NOTES
Anticoagulation Management    Unable to reach Ronna today.    12/28/2021 INR result of 4.41 is supratherapeutic (goal INR of 2.0-3.0).  Result received from: Clinic Lab    Follow up required to confirm warfarin dose taken and assess for changes    Left message to hold warfarin tonight. [facility]  Sent MyChart asking the patient to call.    Anticoagulation clinic to follow up    Elizabeth Recinos, RN  [phone]

## 2022-01-16 ENCOUNTER — HEALTH MAINTENANCE LETTER (OUTPATIENT)
Age: 27
End: 2022-01-16

## 2022-01-19 ENCOUNTER — LAB (OUTPATIENT)
Dept: LAB | Facility: CLINIC | Age: 27
End: 2022-01-19
Payer: COMMERCIAL

## 2022-01-19 DIAGNOSIS — I82.491 ACUTE DEEP VEIN THROMBOSIS (DVT) OF OTHER SPECIFIED VEIN OF RIGHT LOWER EXTREMITY (H): ICD-10-CM

## 2022-01-19 DIAGNOSIS — R76.0 ANTIPHOSPHOLIPID ANTIBODY POSITIVE: ICD-10-CM

## 2022-01-19 LAB — INR PPP: 1.98 (ref 0.85–1.15)

## 2022-01-19 PROCEDURE — 85610 PROTHROMBIN TIME: CPT

## 2022-01-19 PROCEDURE — 36415 COLL VENOUS BLD VENIPUNCTURE: CPT

## 2022-01-20 ENCOUNTER — ANTICOAGULATION THERAPY VISIT (OUTPATIENT)
Dept: ANTICOAGULATION | Facility: CLINIC | Age: 27
End: 2022-01-20
Payer: COMMERCIAL

## 2022-01-20 DIAGNOSIS — Z79.01 LONG TERM CURRENT USE OF ANTICOAGULANTS WITH INR GOAL OF 2.0-3.0: ICD-10-CM

## 2022-01-20 DIAGNOSIS — I82.491 ACUTE DEEP VEIN THROMBOSIS (DVT) OF OTHER SPECIFIED VEIN OF RIGHT LOWER EXTREMITY (H): Primary | ICD-10-CM

## 2022-01-20 DIAGNOSIS — R76.0 ANTIPHOSPHOLIPID ANTIBODY POSITIVE: ICD-10-CM

## 2022-01-20 NOTE — PROGRESS NOTES
Anticoagulation Management    Unable to reach Ronna today.    Today's INR result of 1.98 is subtherapeutic (goal INR of 2.0-3.0).  Result received from: Clinic Lab    Follow up required to confirm warfarin dose taken and assess for changes    Left message to continue current dose of warfarin 5 mg tonight. Request call back for assessment.      Anticoagulation clinic to follow up    Camilo Carrasco RN

## 2022-01-20 NOTE — PROGRESS NOTES
Anticoagulation Management    Unable to reach Ronna today.    Today's INR result of 1.98 is subtherapeutic (goal INR of 2.0-3.0).  Result received from: Clinic Lab    Follow up required to discuss dosing instructions and confirm understanding of instructions    Left message to call back for assessment and dosing insructions.       Anticoagulation clinic to follow up    Camilo Carrasco RN

## 2022-01-21 NOTE — PROGRESS NOTES
ANTICOAGULATION MANAGEMENT     Ronna Coleman 26 year old female is on warfarin with subtherapeutic INR result. (Goal INR 2.0-3.0)    Recent labs: (last 7 days)     01/19/22  1352   INR 1.98*       ASSESSMENT     Source(s): Chart Review and Patient/Caregiver Call       Warfarin doses taken: Warfarin taken as instructed    Diet: No new diet changes identified    New illness, injury, or hospitalization: No    Medication/supplement changes: None noted    Signs or symptoms of bleeding or clotting: No    Previous INR: Subtherapeutic    Additional findings: None     PLAN     Recommended plan for no diet, medication or health factor changes affecting INR     Dosing Instructions: Continue your current warfarin dose with next INR in 2 weeks       Summary  As of 1/20/2022    Full warfarin instructions:  5 mg every Sun, Wed; 7.5 mg all other days   Next INR check:  2/2/2022             Telephone call with Ronna who verbalizes understanding and agrees to plan    Lab visit scheduled    Education provided: Goal range and significance of current result    Plan made per [facility] anticoagulation protocol    Staci Lujan RN  Anticoagulation Clinic  1/21/2022    _______________________________________________________________________     Anticoagulation Episode Summary     Current INR goal:  2.0-3.0   TTR:  79.5 % (1 y)   Target end date:  6/19/2030   Send INR reminders to:  CHENTE [place]    Indications    Acute deep vein thrombosis (DVT) of other specified vein of right lower extremity (H) [I82.491]  Antiphospholipid antibody positive [R76.0]  Long term current use of anticoagulants with INR goal of 2.0-3.0 [Z79.01]           Comments:  Unprovoked dvt  Concern for antiphospolipid antibody syndrome  Anticoagulation likely indefinite but will be reevaluated in 9/2019         Anticoagulation Care Providers     Provider Role Specialty Phone number    Gorge Kebede MD Referring Internal Medicine [phone]    Jose Tierney  MD [facility] [phone]    Ameya Bacon MD Responsible Hematology & Oncology [phone]

## 2022-01-21 NOTE — PROGRESS NOTES
Anticoagulation Management    Unable to reach Ronna today.    [facility] to [facility]    Staci Lujan RN

## 2022-02-02 ENCOUNTER — ANTICOAGULATION THERAPY VISIT (OUTPATIENT)
Dept: ANTICOAGULATION | Facility: CLINIC | Age: 27
End: 2022-02-02

## 2022-02-02 ENCOUNTER — LAB (OUTPATIENT)
Dept: LAB | Facility: CLINIC | Age: 27
End: 2022-02-02
Payer: COMMERCIAL

## 2022-02-02 DIAGNOSIS — I82.491 ACUTE DEEP VEIN THROMBOSIS (DVT) OF OTHER SPECIFIED VEIN OF RIGHT LOWER EXTREMITY (H): ICD-10-CM

## 2022-02-02 DIAGNOSIS — Z79.01 LONG TERM CURRENT USE OF ANTICOAGULANTS WITH INR GOAL OF 2.0-3.0: ICD-10-CM

## 2022-02-02 DIAGNOSIS — R76.0 ANTIPHOSPHOLIPID ANTIBODY POSITIVE: ICD-10-CM

## 2022-02-02 DIAGNOSIS — I82.491 ACUTE DEEP VEIN THROMBOSIS (DVT) OF OTHER SPECIFIED VEIN OF RIGHT LOWER EXTREMITY (H): Primary | ICD-10-CM

## 2022-02-02 LAB — INR PPP: 2.27 (ref 0.85–1.15)

## 2022-02-02 PROCEDURE — 85610 PROTHROMBIN TIME: CPT

## 2022-02-02 PROCEDURE — 36415 COLL VENOUS BLD VENIPUNCTURE: CPT

## 2022-02-02 NOTE — PROGRESS NOTES
Anticoagulation Management    Unable to reach patient today.    Today's INR result of 2.27 is therapeutic (goal INR of 2.0-3.0).  Result received from: Clinic Lab    Follow up required to confirm warfarin dose taken and assess for changes    [facility]    Staci Lujan RN

## 2022-02-02 NOTE — PROGRESS NOTES
ANTICOAGULATION MANAGEMENT     Ronna Coleman 26 year old female is on warfarin with therapeutic INR result. (Goal INR 2.0-3.0)    Recent labs: (last 7 days)     02/02/22  1012   INR 2.27*       ASSESSMENT     Source(s): Chart Review and Patient/Caregiver Call       Warfarin doses taken: Warfarin taken as instructed    Diet: No new diet changes identified    New illness, injury, or hospitalization: No    Medication/supplement changes: None noted    Signs or symptoms of bleeding or clotting: No    Previous INR: Subtherapeutic    Additional findings: None     PLAN     Recommended plan for no diet, medication or health factor changes affecting INR     Dosing Instructions: Continue your current warfarin dose with next INR in 3 weeks       Summary  As of 2/2/2022    Full warfarin instructions:  5 mg every Sun, Wed; 7.5 mg all other days   Next INR check:  2/23/2022             Telephone call with Ronna who verbalizes understanding and agrees to plan    Lab visit scheduled    Education provided: Goal range and significance of current result    Plan made per [facility] anticoagulation protocol    Staci Lujan RN  Anticoagulation Clinic  2/2/2022    _______________________________________________________________________     Anticoagulation Episode Summary     Current INR goal:  2.0-3.0   TTR:  79.3 % (1 y)   Target end date:  6/19/2030   Send INR reminders to:  CHENTE SMILEY    Indications    Acute deep vein thrombosis (DVT) of other specified vein of right lower extremity (H) [I82.491]  Antiphospholipid antibody positive [R76.0]  Long term current use of anticoagulants with INR goal of 2.0-3.0 [Z79.01]           Comments:  Unprovoked dvt  Concern for antiphospolipid antibody syndrome  Anticoagulation likely indefinite but will be reevaluated in 9/2019         Anticoagulation Care Providers     Provider Role Specialty Phone number    Gorge Kebede MD Referring Internal Medicine [phone]    Jose Tierney,  MD [facility] [phone]    Ameya Bacon MD Responsible Hematology & Oncology [phone]

## 2022-03-07 DIAGNOSIS — I82.491 ACUTE DEEP VEIN THROMBOSIS (DVT) OF OTHER SPECIFIED VEIN OF RIGHT LOWER EXTREMITY (H): ICD-10-CM

## 2022-03-07 DIAGNOSIS — Z79.01 LONG TERM CURRENT USE OF ANTICOAGULANTS WITH INR GOAL OF 2.0-3.0: ICD-10-CM

## 2022-03-07 DIAGNOSIS — R76.0 ANTIPHOSPHOLIPID ANTIBODY POSITIVE: ICD-10-CM

## 2022-03-07 RX ORDER — WARFARIN SODIUM 5 MG/1
TABLET ORAL
Qty: 187 TABLET | Refills: 1 | Status: SHIPPED | OUTPATIENT
Start: 2022-03-07 | End: 2022-11-11

## 2022-03-07 NOTE — TELEPHONE ENCOUNTER
Prescription approved per [facility] Refill Protocol.  Ivania Henry Rn  [facility]

## 2022-03-13 ENCOUNTER — HEALTH MAINTENANCE LETTER (OUTPATIENT)
Age: 27
End: 2022-03-13

## 2022-03-24 ENCOUNTER — TELEPHONE (OUTPATIENT)
Dept: FAMILY MEDICINE | Facility: CLINIC | Age: 27
End: 2022-03-24
Payer: COMMERCIAL

## 2022-03-24 NOTE — TELEPHONE ENCOUNTER
ANTICOAGULATION     Ronna Coleman is overdue for INR check.      Left message for patient to call and schedule lab appointment as soon as possible. If returning call, please schedule.     Delmy Bello RN

## 2022-03-31 ENCOUNTER — TELEPHONE (OUTPATIENT)
Dept: ANTICOAGULATION | Facility: CLINIC | Age: 27
End: 2022-03-31
Payer: COMMERCIAL

## 2022-03-31 NOTE — TELEPHONE ENCOUNTER
ANTICOAGULATION     Ronna Coleman is overdue for INR check.      Left message for patient to call and schedule lab appointment as soon as possible. If returning call, please schedule.     Danuta Castillo RN

## 2022-05-04 ENCOUNTER — DOCUMENTATION ONLY (OUTPATIENT)
Dept: ANTICOAGULATION | Facility: CLINIC | Age: 27
End: 2022-05-04
Payer: COMMERCIAL

## 2022-05-04 NOTE — LETTER
May 4, 2022      Ronna Coleman  [address] [place] [address]  [place] [address]      Dear Ronna,    You are currently under the care of [facility] Anticoagulation Management Program for your warfarin (Coumadin ) therapy.  We are contacting you because our records show you were due for an INR on 2/23/22.    There are potentially serious risks when taking warfarin without careful monitoring and we want to make sure you are safely managed.  Routine INR monitoring is required for warfarin refills.     Please call [phone]  as soon as possible to schedule an appointment.  If there has been a change in your care or other concerns, please let us know so we can help and or update our records.     Sincerely,       [facility]

## 2022-06-07 ENCOUNTER — TELEPHONE (OUTPATIENT)
Dept: FAMILY MEDICINE | Facility: CLINIC | Age: 27
End: 2022-06-07
Payer: COMMERCIAL

## 2022-06-07 NOTE — LETTER
June 7, 2022      Ronna Coleman  [address] [place] [address]  [place] [address]        Dear Ronna,     You are currently under the care of [facility]ticoagulation Management Program for your warfarin (Coumadin , Jantoven ) therapy.  We are contacting you because our records show you were due for an INR on 2/23/22.    There are potentially serious risks when taking warfarin without careful monitoring and we want to make sure you are safely managed.  Routine lab monitoring is required for warfarin refills.     Please call [phone]  as soon as possible to schedule an appointment.  If there has been a change in your care or other concerns, please let us know so we can help and or update our records.     Sincerely,       [facility]          Sincerely,        Jose Tierney MD

## 2022-06-07 NOTE — TELEPHONE ENCOUNTER
Anticoagulation clinic notificiation    Dr. Tierney,    Your patient, Ronna Coleman, is past due for an INR. Their last result was 2.27 on 2/2/22 and was due to come back on 2/23/22.    Ronna Coleman received phone calls and letters over the last several weeks in attempt to arrange a follow up appointment.  Ronna Coleman will be sent another letter today.     No action is required from you unless you have additional information or if you would like to reach out personally to Ronna Oroscogaro.    Please don t hesitate to contact the Anticoagulation Management Program at [phone] if you have any questions or concerns.     Sincerely,     [facility]

## 2022-07-03 ENCOUNTER — HEALTH MAINTENANCE LETTER (OUTPATIENT)
Age: 27
End: 2022-07-03

## 2022-07-11 ENCOUNTER — DOCUMENTATION ONLY (OUTPATIENT)
Dept: ANTICOAGULATION | Facility: CLINIC | Age: 27
End: 2022-07-11

## 2022-07-11 DIAGNOSIS — Z79.01 LONG TERM CURRENT USE OF ANTICOAGULANTS WITH INR GOAL OF 2.0-3.0: ICD-10-CM

## 2022-07-11 DIAGNOSIS — R76.0 ANTIPHOSPHOLIPID ANTIBODY POSITIVE: ICD-10-CM

## 2022-07-11 DIAGNOSIS — I82.491 ACUTE DEEP VEIN THROMBOSIS (DVT) OF OTHER SPECIFIED VEIN OF RIGHT LOWER EXTREMITY (H): Primary | ICD-10-CM

## 2022-07-11 NOTE — LETTER
July 11, 2022      Ronna Coleman  [address] [place] [address]      Dear Ronna,    You have been under the care of the [facility] Anticoagulation Management Program for monitoring of your warfarin (Coumadin , Jantoven )  for your INR.  Our records indicate that you are either past due to have your blood work checked or have not followed clinic staff recommendations. Your last INR was on 2/2/22.     We regret to inform you that since you have not responded to our phone calls and letters the [facility]ticoagulation [facility] Program will no longer be able to manage your warfarin therapy. To ensure safe use of your warfarin and to receive additional warfarin refills you will need to make an office visit with Dr. Kebede's office as soon as possible to discuss your warfarin therapy. Please call [phone] or [phone] to schedule an appointment.       Sincerely,       [facility]

## 2022-07-11 NOTE — PROGRESS NOTES
ANTICOAGULATION  MANAGEMENT    Ronna Coleman is being discharged from the [facility] Anticoagulation Management Program (ACC).    Patient ACC referral has , discharging due to noncompliance.    Reason for discharge: non-compliance with Anticoagulation Management Program despite outreach.  Last  INR was done on 22. Letter mailed to patient advising them to schedule visit with referring provider.       Anticoagulation episode resolved, ACC referral closed and Standing order discontinued    Patient may be accepted back in ACC program after an office visit to discuss non-compliance and check INR. Patient must be agreeable to follow the monitoring recommendations of the program and will need a new referral to be re-enrolled.    Nissa Campos RN

## 2022-08-17 ENCOUNTER — TRANSFERRED RECORDS (OUTPATIENT)
Dept: HEALTH INFORMATION MANAGEMENT | Facility: CLINIC | Age: 27
End: 2022-08-17

## 2022-08-25 ENCOUNTER — LAB (OUTPATIENT)
Dept: LAB | Facility: CLINIC | Age: 27
End: 2022-08-25
Payer: COMMERCIAL

## 2022-08-25 ENCOUNTER — DOCUMENTATION ONLY (OUTPATIENT)
Dept: LAB | Facility: CLINIC | Age: 27
End: 2022-08-25

## 2022-08-25 DIAGNOSIS — Z79.01 LONG TERM CURRENT USE OF ANTICOAGULANTS WITH INR GOAL OF 2.0-3.0: ICD-10-CM

## 2022-08-25 DIAGNOSIS — R76.0 ANTIPHOSPHOLIPID ANTIBODY POSITIVE: ICD-10-CM

## 2022-08-25 LAB — HOLD SPECIMEN: NORMAL

## 2022-08-25 PROCEDURE — 36415 COLL VENOUS BLD VENIPUNCTURE: CPT

## 2022-08-25 PROCEDURE — 85610 PROTHROMBIN TIME: CPT

## 2022-08-25 NOTE — PROGRESS NOTES
Hi  please provider an INR order for blood collected on 8/25/22 at [facility] if needed and we freeze and keep the sample for a month.Thanks

## 2022-08-28 DIAGNOSIS — Z79.01 LONG TERM CURRENT USE OF ANTICOAGULANTS WITH INR GOAL OF 2.0-3.0: Primary | ICD-10-CM

## 2022-08-28 DIAGNOSIS — R76.0 ANTIPHOSPHOLIPID ANTIBODY POSITIVE: ICD-10-CM

## 2022-08-29 LAB — INR PPP: 1.61 (ref 0.85–1.15)

## 2022-09-14 ENCOUNTER — VIRTUAL VISIT (OUTPATIENT)
Dept: FAMILY MEDICINE | Facility: CLINIC | Age: 27
End: 2022-09-14
Payer: COMMERCIAL

## 2022-09-14 ENCOUNTER — ANTICOAGULATION THERAPY VISIT (OUTPATIENT)
Dept: ANTICOAGULATION | Facility: CLINIC | Age: 27
End: 2022-09-14

## 2022-09-14 DIAGNOSIS — Z79.01 LONG TERM CURRENT USE OF ANTICOAGULANTS WITH INR GOAL OF 2.0-3.0: ICD-10-CM

## 2022-09-14 DIAGNOSIS — I82.4Y3 ACUTE DEEP VEIN THROMBOSIS (DVT) OF PROXIMAL END OF BOTH LOWER EXTREMITIES (H): ICD-10-CM

## 2022-09-14 DIAGNOSIS — R76.0 ANTIPHOSPHOLIPID ANTIBODY POSITIVE: Primary | ICD-10-CM

## 2022-09-14 DIAGNOSIS — R76.0 ANTIPHOSPHOLIPID ANTIBODY POSITIVE: ICD-10-CM

## 2022-09-14 DIAGNOSIS — I82.4Y3 ACUTE DEEP VEIN THROMBOSIS (DVT) OF PROXIMAL END OF BOTH LOWER EXTREMITIES (H): Primary | ICD-10-CM

## 2022-09-14 PROCEDURE — 99204 OFFICE O/P NEW MOD 45 MIN: CPT | Mod: 95 | Performed by: FAMILY MEDICINE

## 2022-09-14 RX ORDER — WARFARIN SODIUM 5 MG/1
5 TABLET ORAL DAILY
Qty: 90 TABLET | Refills: 3 | Status: SHIPPED | OUTPATIENT
Start: 2022-09-14 | End: 2022-11-09

## 2022-09-14 NOTE — PROGRESS NOTES
Ronna is a 26 year old who is being evaluated via a billable video visit.      How would you like to obtain your AVS? MyChart  If the video visit is dropped, the invitation should be resent by: Send to e-mail at: [email or web address]  Will anyone else be joining your video visit? No          Assessment & Plan     Acute deep vein thrombosis (DVT) of proximal end of both lower extremities (H)  Reviewed documents and reports from care everywhere  Reviewed the report of positive DVT on the left proximal leg  Emphasized on medication compliance, patient is currently taking warfarin 5 mg daily  We will recheck INR today, will start getting INR checks at the anticoagulation clinic, referral made today  Recommended patient to be seen in the clinic in 2 to 3 months for annual physical or sooner if needed  - INR; Future  - Anticoagulation Clinic Referral  -Warfarin 5 mg tablet, take 1 tablet daily    Antiphospholipid antibody positive  as above    - INR; Future  - Anticoagulation Clinic Referral  --Warfarin 5 mg tablet, take 1 tablet daily    Long term current use of anticoagulants with INR goal of 2.0-3.0  as above    - INR; Future  - Anticoagulation Clinic Referral  --Warfarin 5 mg tablet, take 1 tablet daily    Review of the result(s) of each unique test - Leg ultrasound through Care Everywhere and INR checks, recent A1c  956}     Chart documentation done in part with Dragon Voice recognition Software. Although reviewed after completion, some word and grammatical error may remain.    See Patient Instructions    Return in about 3 months (around 12/14/2022), or if symptoms worsen or fail to improve, for Physical Exam, fasting labs.    Jose Tierney MD  [facility]   Ronna is a 26 year old{, presenting for the following health issues:  UC Follow-Up and INR Followup  Patient is here for a video visit instead of in person visit due to the current COVID-19 pandemic.  Patient with past  medical history significant for type 1 diabetes, antiphospholipid syndrome, history of DVT in the past, celiac disease, hypothyroidism is here for video visit for reestablishing care and for resumption of INR clinic checks and to get refills on Coumadin.  Patient has not seen with us since August 2019  Patient reported that she ran out of Coumadin 10 days ago following which she developed swelling and pain of the left lower leg patient was seen with the [facility] system in the ER, had an ultrasound to confirm the DVT.  Patient was started on Lovenox injection and Coumadin  She is currently taking Coumadin 5 mg once daily  Denies complete resolution of pain and swelling  Denies shortness of breath, chest pain, hemoptysis, palpitations.  Patient is due for INR check      History of Present Illness       Reason for visit:  Check up on my vlood clotting disorder and get a new prescription for warferin.    She eats 2-3 servings of fruits and vegetables daily.She consumes 2 sweetened beverage(s) daily.She exercises with enough effort to increase her heart rate 9 or less minutes per day.  She exercises with enough effort to increase her heart rate 3 or less days per week.   She is taking medications regularly.       ED/UC Followup:    Facility:  [facility]   Date of visit: 9/3/2022  Reason for visit: Acute deep vein thrombosis (DVT) of proximal vein of left lower extremity (HC) (Primary Dx)  Current Status: discharged         Review of Systems   CONSTITUTIONAL: NEGATIVE for fever, chills, change in weight  RESP: NEGATIVE for significant cough or SOB  CV: NEGATIVE for chest pain, palpitations or peripheral edema  GI: History of celiac disease  MUSCULOSKELETAL: as above  NEURO: NEGATIVE for weakness, dizziness or paresthesias  ENDOCRINE: NEGATIVE for temperature intolerance, skin/hair changes and history of type 1 diabetes and hypothyroidism  HEME/ALLERGY/IMMUNE: NEGATIVE for bleeding problems and history  of antiphospholipid syndrome  PSYCHIATRIC: NEGATIVE for changes in mood or affect      Objective           Vitals:  No vitals were obtained today due to virtual visit.    Physical Exam   GENERAL: Healthy, alert and no distress  EYES: Eyes grossly normal to inspection  RESP: No audible wheeze, cough, or visible cyanosis.  No visible retractions or increased work of breathing.    NEURO: Cranial nerves grossly intact.  Mentation and speech appropriate for age.  PSYCH: Mentation appears normal, affect normal/bright, judgement and insight intact, normal speech and appearance well-groomed.    Labs-ordered today            Video-Visit Details    Video Start Time: 1:32pm    Type of service:  Video Visit    Video End Time:1:40 PM    Originating Location (pt. Location): Home    Distant Location (provider location):  [facility]     Platform used for Video Visit: I[employer]

## 2022-09-14 NOTE — PROGRESS NOTES
ANTICOAGULATION MANAGEMENT     Ronna Coleman 26 year old female is on warfarin  (Goal INR 2.0-3.0)    No results for input(s): INR in the last 168 hours.    ASSESSMENT     Unable to talk with Ronna, Mom states she is at work. Will have inr tomorrow     PLAN     Unable to reach Ronna today.Was able to talk with Mom Glenny, and scheduled INR for tomorrow    Left message to continue current dose of warfarin 5 mg tonight. Request call back for assessment.    Follow up required to confirm warfarin dose taken and assess for changes, confirm warfarin dose taken, assess for changes , discuss out of range result , discuss dosing instructions and confirm understanding of instructions and New restart on 9/3, still on 5mg per day, per mom.     Gabbie Armendariz RN  Anticoagulation Clinic  9/14/2022

## 2022-09-15 ENCOUNTER — LAB (OUTPATIENT)
Dept: LAB | Facility: CLINIC | Age: 27
End: 2022-09-15
Payer: COMMERCIAL

## 2022-09-15 ENCOUNTER — ANTICOAGULATION THERAPY VISIT (OUTPATIENT)
Dept: ANTICOAGULATION | Facility: CLINIC | Age: 27
End: 2022-09-15

## 2022-09-15 DIAGNOSIS — I82.4Y3 ACUTE DEEP VEIN THROMBOSIS (DVT) OF PROXIMAL END OF BOTH LOWER EXTREMITIES (H): ICD-10-CM

## 2022-09-15 DIAGNOSIS — Z79.01 LONG TERM CURRENT USE OF ANTICOAGULANTS WITH INR GOAL OF 2.0-3.0: ICD-10-CM

## 2022-09-15 DIAGNOSIS — R76.0 ANTIPHOSPHOLIPID ANTIBODY POSITIVE: ICD-10-CM

## 2022-09-15 DIAGNOSIS — R76.0 ANTIPHOSPHOLIPID ANTIBODY POSITIVE: Primary | ICD-10-CM

## 2022-09-15 LAB — INR PPP: 1.38 (ref 0.85–1.15)

## 2022-09-15 PROCEDURE — 36415 COLL VENOUS BLD VENIPUNCTURE: CPT

## 2022-09-15 PROCEDURE — 85610 PROTHROMBIN TIME: CPT

## 2022-09-15 RX ORDER — ENOXAPARIN SODIUM 100 MG/ML
100 INJECTION SUBCUTANEOUS EVERY 12 HOURS
Qty: 12 ML | Refills: 0 | Status: SHIPPED | OUTPATIENT
Start: 2022-09-15 | End: 2022-09-21

## 2022-09-15 NOTE — PROGRESS NOTES
ANTICOAGULATION MANAGEMENT     Ronna Coleman 26 year old female is on warfarin with subtherapeutic INR result. (Goal INR 2.0-3.0)    Recent labs: (last 7 days)     09/15/22  0903   INR 1.38*       ASSESSMENT       Source(s): Chart Review and Patient/Caregiver Call       Warfarin doses taken: Warfarin taken as instructed and took last dose of Lovenox prescribed from hospital on 9/10/22    Diet: No new diet changes identified    New illness, injury, or hospitalization: Yes: DVT of LLE - seen in ED 9/3/22    Medication/supplement changes: None noted    Signs or symptoms of bleeding or clotting: Nothing new    Previous INR: N/A    Additional findings: Patient has agreed to resume Lovenox. Needs Rx sent to [facility] on [address]. Filling same dose/directions as patient sent home with from ED on 9/3/22.       PLAN     Recommended plan for ongoing change(s) affecting INR     Dosing Instructions: Increase your warfarin dose (35.7% change) Restart bridging with Enoxaparin with next INR in 4 days     **Dose increased to previous warfarin dosing when patient was managed by [facility].      Summary  As of 9/15/2022    Full warfarin instructions:  5 mg every Sun, Wed; 7.5 mg all other days   Next INR check:  9/20/2022             Telephone call with Ronna who verbalizes understanding and agrees to plan    Lab visit scheduled for 9/20 due to work schedule issue.    Education provided: Goal range and significance of current result, Importance of therapeutic range, Importance of following up at instructed interval, Monitoring for clotting signs and symptoms and Contact [phone]  with any changes, questions or concerns.     Plan made with [facility] Pharmacist Jeane Angeles, HEBER  Anticoagulation Clinic  9/15/2022    _______________________________________________________________________     Anticoagulation Episode Summary     Current INR goal:  2.0-3.0   TTR:  --   Target end date:  9/14/2022   Send INR reminders to:   [facility]    Indications    Antiphospholipid antibody positive [R76.0]  Long term current use of anticoagulants with INR goal of 2.0-3.0 [Z79.01]  Acute deep vein thrombosis (DVT) of proximal end of both lower extremities (H) [I82.4Y3]           Comments:           Anticoagulation Care Providers     Provider Role Specialty Phone number    Jose Tierney MD Referring Family Medicine [phone]

## 2022-09-20 ENCOUNTER — LAB (OUTPATIENT)
Dept: LAB | Facility: CLINIC | Age: 27
End: 2022-09-20
Payer: COMMERCIAL

## 2022-09-20 ENCOUNTER — ANTICOAGULATION THERAPY VISIT (OUTPATIENT)
Dept: ANTICOAGULATION | Facility: CLINIC | Age: 27
End: 2022-09-20

## 2022-09-20 ENCOUNTER — DOCUMENTATION ONLY (OUTPATIENT)
Dept: LAB | Facility: CLINIC | Age: 27
End: 2022-09-20

## 2022-09-20 DIAGNOSIS — R76.0 ANTIPHOSPHOLIPID ANTIBODY POSITIVE: Primary | ICD-10-CM

## 2022-09-20 DIAGNOSIS — Z83.2 FAMILY HISTORY OF CLOTTING DISORDER: ICD-10-CM

## 2022-09-20 DIAGNOSIS — I82.4Y3 ACUTE DEEP VEIN THROMBOSIS (DVT) OF PROXIMAL END OF BOTH LOWER EXTREMITIES (H): ICD-10-CM

## 2022-09-20 DIAGNOSIS — Z11.59 NEED FOR HEPATITIS C SCREENING TEST: ICD-10-CM

## 2022-09-20 DIAGNOSIS — Z79.01 LONG TERM CURRENT USE OF ANTICOAGULANTS WITH INR GOAL OF 2.0-3.0: ICD-10-CM

## 2022-09-20 DIAGNOSIS — R76.0 ANTIPHOSPHOLIPID ANTIBODY POSITIVE: ICD-10-CM

## 2022-09-20 DIAGNOSIS — E10.9 TYPE 1 DIABETES (H): ICD-10-CM

## 2022-09-20 DIAGNOSIS — Z13.220 SCREENING FOR HYPERLIPIDEMIA: ICD-10-CM

## 2022-09-20 DIAGNOSIS — Z79.01 LONG TERM CURRENT USE OF ANTICOAGULANTS WITH INR GOAL OF 2.0-3.0: Primary | ICD-10-CM

## 2022-09-20 LAB — INR PPP: 2.04 (ref 0.85–1.15)

## 2022-09-20 PROCEDURE — 85610 PROTHROMBIN TIME: CPT

## 2022-09-20 PROCEDURE — 36415 COLL VENOUS BLD VENIPUNCTURE: CPT

## 2022-09-20 NOTE — PROGRESS NOTES
Standing INR order placed.     Michelle Ye RN, BSN  [facility]  [phone]

## 2022-09-20 NOTE — PROGRESS NOTES
Patient has a lab appointment today 9/20/2022 for an INR and there are no orders in the chart. Please review and place orders.     Zee Jimenez on 9/20/2022 at 10:10 AM

## 2022-09-21 NOTE — PROGRESS NOTES
ANTICOAGULATION MANAGEMENT     Ronna Coleman 26 year old female is on warfarin with therapeutic INR result. (Goal INR 2.0-3.0)    Recent labs: (last 7 days)     09/20/22  1322   INR 2.04*       ASSESSMENT       Source(s): Chart Review       Warfarin doses taken: Reviewed in chart    Diet: No new diet changes identified    New illness, injury, or hospitalization: No    Medication/supplement changes: None noted    Signs or symptoms of bleeding or clotting: No    Previous INR: Subtherapeutic    Additional findings: restarted warfarin 6 days ago after non compliance       PLAN     Recommended plan for no diet, medication or health factor changes affecting INR     Dosing Instructions: Continue your current warfarin dose with next INR in 2 days- Thursday since she has venous INR's and we wouldn't get the result back until Friday. UNLESS she is able to go in early on Friday AM so that we get the result back before end of day.        Summary  As of 9/20/2022    Full warfarin instructions:  5 mg every Sun, Wed; 7.5 mg all other days   Next INR check:  9/22/2022             Detailed voice message left for Ronna with dosing instructions and follow up date.     Contact [phone]  to schedule and with any changes, questions or concerns.     Education provided: Please call back if any changes to your diet, medications or how you've been taking warfarin    Plan made per [facility] anticoagulation protocol    Anupama Lipscomb RN  Anticoagulation Clinic  9/21/2022    _______________________________________________________________________     Anticoagulation Episode Summary     Current INR goal:  2.0-3.0   TTR:  --   Target end date:  9/14/2022   Send INR reminders to:  [facility] [place]    Indications    Antiphospholipid antibody positive [R76.0]  Long term current use of anticoagulants with INR goal of 2.0-3.0 [Z79.01]  Acute deep vein thrombosis (DVT) of proximal end of both lower extremities (H) [I82.4Y3]           Comments:            Anticoagulation Care Providers     Provider Role Specialty Phone number    Jose Tierney MD Referring Family Medicine [phone]

## 2022-09-22 ENCOUNTER — ANTICOAGULATION THERAPY VISIT (OUTPATIENT)
Dept: ANTICOAGULATION | Facility: CLINIC | Age: 27
End: 2022-09-22

## 2022-09-22 ENCOUNTER — LAB (OUTPATIENT)
Dept: LAB | Facility: CLINIC | Age: 27
End: 2022-09-22
Payer: COMMERCIAL

## 2022-09-22 DIAGNOSIS — R76.0 ANTIPHOSPHOLIPID ANTIBODY POSITIVE: ICD-10-CM

## 2022-09-22 DIAGNOSIS — Z79.01 LONG TERM CURRENT USE OF ANTICOAGULANTS WITH INR GOAL OF 2.0-3.0: ICD-10-CM

## 2022-09-22 DIAGNOSIS — I82.4Y3 ACUTE DEEP VEIN THROMBOSIS (DVT) OF PROXIMAL END OF BOTH LOWER EXTREMITIES (H): ICD-10-CM

## 2022-09-22 DIAGNOSIS — R76.0 ANTIPHOSPHOLIPID ANTIBODY POSITIVE: Primary | ICD-10-CM

## 2022-09-22 DIAGNOSIS — Z83.2 FAMILY HISTORY OF CLOTTING DISORDER: ICD-10-CM

## 2022-09-22 DIAGNOSIS — Z11.59 NEED FOR HEPATITIS C SCREENING TEST: ICD-10-CM

## 2022-09-22 LAB — INR PPP: 1.99 (ref 0.85–1.15)

## 2022-09-22 PROCEDURE — 86803 HEPATITIS C AB TEST: CPT

## 2022-09-22 PROCEDURE — 85610 PROTHROMBIN TIME: CPT

## 2022-09-22 PROCEDURE — 36415 COLL VENOUS BLD VENIPUNCTURE: CPT

## 2022-09-22 NOTE — PROGRESS NOTES
ANTICOAGULATION MANAGEMENT     Ronna Coleman 26 year old female is on warfarin with therapeutic INR result. (Goal INR 2.0-3.0)    Recent labs: (last 7 days)     09/22/22  1130   INR 1.99*       ASSESSMENT       Source(s): Chart Review and Patient/Caregiver Call       Warfarin doses taken: Warfarin taken as instructed    Diet: No new diet changes identified    New illness, injury, or hospitalization: No    Medication/supplement changes: None noted    Signs or symptoms of bleeding or clotting: No    Previous INR: Subtherapeutic    Additional findings: None       PLAN     Recommended plan for no diet, medication or health factor changes affecting INR     Dosing Instructions: Increase your warfarin dose (5.3% change) with next INR in 6 days       Summary  As of 9/22/2022    Full warfarin instructions:  5 mg every Wed; 7.5 mg all other days   Next INR check:  9/28/2022             Telephone call with Ronna who verbalizes understanding and agrees to plan and who agrees to plan and repeated back plan correctly    Lab visit scheduled    Education provided: None required    Plan made per [facility] anticoagulation protocol    Anupama Lipscomb RN  Anticoagulation Clinic  9/22/2022    _______________________________________________________________________     Anticoagulation Episode Summary     Current INR goal:  2.0-3.0   TTR:  --   Target end date:  9/14/2022   Send INR reminders to:  [facility] [place]    Indications    Antiphospholipid antibody positive [R76.0]  Long term current use of anticoagulants with INR goal of 2.0-3.0 [Z79.01]  Acute deep vein thrombosis (DVT) of proximal end of both lower extremities (H) [I82.4Y3]           Comments:           Anticoagulation Care Providers     Provider Role Specialty Phone number    Jose Tierney MD Referring Family Medicine [phone]

## 2022-09-23 LAB — HCV AB SERPL QL IA: NONREACTIVE

## 2022-09-30 ENCOUNTER — LAB (OUTPATIENT)
Dept: LAB | Facility: CLINIC | Age: 27
End: 2022-09-30
Payer: COMMERCIAL

## 2022-09-30 ENCOUNTER — DOCUMENTATION ONLY (OUTPATIENT)
Dept: ANTICOAGULATION | Facility: CLINIC | Age: 27
End: 2022-09-30

## 2022-09-30 ENCOUNTER — ANTICOAGULATION THERAPY VISIT (OUTPATIENT)
Dept: ANTICOAGULATION | Facility: CLINIC | Age: 27
End: 2022-09-30

## 2022-09-30 DIAGNOSIS — I82.4Y3 ACUTE DEEP VEIN THROMBOSIS (DVT) OF PROXIMAL END OF BOTH LOWER EXTREMITIES (H): ICD-10-CM

## 2022-09-30 DIAGNOSIS — Z79.01 LONG TERM CURRENT USE OF ANTICOAGULANTS WITH INR GOAL OF 2.0-3.0: ICD-10-CM

## 2022-09-30 DIAGNOSIS — R76.0 ANTIPHOSPHOLIPID ANTIBODY POSITIVE: ICD-10-CM

## 2022-09-30 DIAGNOSIS — Z83.2 FAMILY HISTORY OF CLOTTING DISORDER: ICD-10-CM

## 2022-09-30 DIAGNOSIS — R76.0 ANTIPHOSPHOLIPID ANTIBODY POSITIVE: Primary | ICD-10-CM

## 2022-09-30 LAB — INR PPP: 2.1 (ref 0.85–1.15)

## 2022-09-30 PROCEDURE — 36415 COLL VENOUS BLD VENIPUNCTURE: CPT

## 2022-09-30 PROCEDURE — 85610 PROTHROMBIN TIME: CPT

## 2022-09-30 NOTE — PROGRESS NOTES
ANTICOAGULATION MANAGEMENT     Ronna Coleman 26 year old female is on warfarin with therapeutic INR result. (Goal INR 2.0-3.0)    Recent labs: (last 7 days)     09/30/22  0928   INR 2.10*       ASSESSMENT       Source(s): Chart Review and Patient/Caregiver Call       Warfarin doses taken: Warfarin taken as instructed    Diet: No new diet changes identified    New illness, injury, or hospitalization: No    Medication/supplement changes: None noted    Signs or symptoms of bleeding or clotting: No    Previous INR: Subtherapeutic    Additional findings: new start on 9/14       PLAN     Recommended plan for no diet, medication or health factor changes affecting INR     Dosing Instructions: Continue your current warfarin dose with next INR in 5 days       Summary  As of 9/30/2022    Full warfarin instructions:  5 mg every Wed; 7.5 mg all other days   Next INR check:  10/4/2022             Telephone call with Ronna who agrees to plan and repeated back plan correctly    Lab visit scheduled    Education provided: Contact [phone]  with any changes, questions or concerns.     Plan made per [facility] anticoagulation protocol    Dulce Kendall RN  Anticoagulation Clinic  9/30/2022    _______________________________________________________________________     Anticoagulation Episode Summary     Current INR goal:  2.0-3.0   TTR:  100.0 % (6 d)   Target end date:  9/30/2023   Send INR reminders to:  [facility]    Indications    Antiphospholipid antibody positive [R76.0]  Long term current use of anticoagulants with INR goal of 2.0-3.0 [Z79.01]  Acute deep vein thrombosis (DVT) of proximal end of both lower extremities (H) [I82.4Y3]           Comments:           Anticoagulation Care Providers     Provider Role Specialty Phone number    Jose Tierney MD Referring Family Medicine [phone]

## 2022-10-05 ENCOUNTER — LAB (OUTPATIENT)
Dept: LAB | Facility: CLINIC | Age: 27
End: 2022-10-05
Payer: COMMERCIAL

## 2022-10-05 ENCOUNTER — ANTICOAGULATION THERAPY VISIT (OUTPATIENT)
Dept: ANTICOAGULATION | Facility: CLINIC | Age: 27
End: 2022-10-05

## 2022-10-05 DIAGNOSIS — Z83.2 FAMILY HISTORY OF CLOTTING DISORDER: ICD-10-CM

## 2022-10-05 DIAGNOSIS — Z79.01 LONG TERM CURRENT USE OF ANTICOAGULANTS WITH INR GOAL OF 2.0-3.0: ICD-10-CM

## 2022-10-05 DIAGNOSIS — I82.4Y3 ACUTE DEEP VEIN THROMBOSIS (DVT) OF PROXIMAL END OF BOTH LOWER EXTREMITIES (H): ICD-10-CM

## 2022-10-05 DIAGNOSIS — R76.0 ANTIPHOSPHOLIPID ANTIBODY POSITIVE: ICD-10-CM

## 2022-10-05 DIAGNOSIS — R76.0 ANTIPHOSPHOLIPID ANTIBODY POSITIVE: Primary | ICD-10-CM

## 2022-10-05 LAB — INR PPP: 2.14 (ref 0.85–1.15)

## 2022-10-05 PROCEDURE — 36415 COLL VENOUS BLD VENIPUNCTURE: CPT

## 2022-10-05 PROCEDURE — 85610 PROTHROMBIN TIME: CPT

## 2022-10-05 NOTE — PROGRESS NOTES
ANTICOAGULATION MANAGEMENT     Ronna Coleman 26 year old female is on warfarin with therapeutic INR result. (Goal INR 2.0-3.0)    Recent labs: (last 7 days)     10/05/22  1108   INR 2.14*       ASSESSMENT       Source(s): Chart Review and Patient/Caregiver Call       Warfarin doses taken: Warfarin taken as instructed    Diet: No new diet changes identified    New illness, injury, or hospitalization: No    Medication/supplement changes: None noted    Signs or symptoms of bleeding or clotting: No    Previous INR: Therapeutic last visit; previously outside of goal range    Additional findings: None       PLAN     Recommended plan for no diet, medication or health factor changes affecting INR     Dosing Instructions: Increase your warfarin dose (5% change) with next INR in 1 week       Summary  As of 10/5/2022    Full warfarin instructions:  7.5 mg every day   Next INR check:  10/12/2022             Telephone call with Ronna who verbalizes understanding and agrees to plan and who agrees to plan and repeated back plan correctly    Lab visit scheduled    Education provided: None required    Plan made per [facility]agulation protocol    Anupama Lipscomb, RN  Anticoagulation Clinic  10/5/2022    _______________________________________________________________________     Anticoagulation Episode Summary     Current INR goal:  2.0-3.0   TTR:  100.0 % (1.6 wk)   Target end date:  9/30/2023   Send INR reminders to:  [facility]    Indications    Antiphospholipid antibody positive [R76.0]  Long term current use of anticoagulants with INR goal of 2.0-3.0 [Z79.01]  Acute deep vein thrombosis (DVT) of proximal end of both lower extremities (H) [I82.4Y3]           Comments:           Anticoagulation Care Providers     Provider Role Specialty Phone number    Jose Tierney MD Referring Family Medicine [phone]

## 2022-10-12 ENCOUNTER — LAB (OUTPATIENT)
Dept: LAB | Facility: CLINIC | Age: 27
End: 2022-10-12
Payer: COMMERCIAL

## 2022-10-12 ENCOUNTER — ANTICOAGULATION THERAPY VISIT (OUTPATIENT)
Dept: ANTICOAGULATION | Facility: CLINIC | Age: 27
End: 2022-10-12

## 2022-10-12 DIAGNOSIS — R76.0 ANTIPHOSPHOLIPID ANTIBODY POSITIVE: ICD-10-CM

## 2022-10-12 DIAGNOSIS — I82.4Y3 ACUTE DEEP VEIN THROMBOSIS (DVT) OF PROXIMAL END OF BOTH LOWER EXTREMITIES (H): ICD-10-CM

## 2022-10-12 DIAGNOSIS — Z79.01 LONG TERM CURRENT USE OF ANTICOAGULANTS WITH INR GOAL OF 2.0-3.0: ICD-10-CM

## 2022-10-12 DIAGNOSIS — R76.0 ANTIPHOSPHOLIPID ANTIBODY POSITIVE: Primary | ICD-10-CM

## 2022-10-12 DIAGNOSIS — Z83.2 FAMILY HISTORY OF CLOTTING DISORDER: ICD-10-CM

## 2022-10-12 PROCEDURE — 85610 PROTHROMBIN TIME: CPT

## 2022-10-12 PROCEDURE — 36415 COLL VENOUS BLD VENIPUNCTURE: CPT

## 2022-10-13 LAB — INR PPP: 2.14 (ref 0.85–1.15)

## 2022-10-13 NOTE — PROGRESS NOTES
ANTICOAGULATION MANAGEMENT     Ronna Coleman 26 year old female is on warfarin with therapeutic INR result. (Goal INR 2.0-3.0)    Recent labs: (last 7 days)     10/12/22  1303   INR 2.14*       ASSESSMENT       Source(s): Chart Review and Patient/Caregiver Call       Warfarin doses taken: Warfarin taken as instructed    Diet: No new diet changes identified    New illness, injury, or hospitalization: No    Medication/supplement changes: None noted    Signs or symptoms of bleeding or clotting: No    Previous INR: Therapeutic last 2(+) visits    Additional findings: None       PLAN     Recommended plan for no diet, medication or health factor changes affecting INR     Dosing Instructions: Increase your warfarin dose (4.8% change) with next INR in 1 week       Summary  As of 10/12/2022    Full warfarin instructions:  10 mg every Thu; 7.5 mg all other days   Next INR check:  10/19/2022             Telephone call with Ronna who agrees to plan and repeated back plan correctly    Lab visit scheduled    Education provided: None required    Plan made per [facility]agulation protocol    Anupama Lipscomb RN  Anticoagulation Clinic  10/13/2022    _______________________________________________________________________     Anticoagulation Episode Summary     Current INR goal:  2.0-3.0   TTR:  100.0 % (2.6 wk)   Target end date:  9/30/2023   Send INR reminders to:  [facility]    Indications    Antiphospholipid antibody positive [R76.0]  Long term current use of anticoagulants with INR goal of 2.0-3.0 [Z79.01]  Acute deep vein thrombosis (DVT) of proximal end of both lower extremities (H) [I82.4Y3]           Comments:           Anticoagulation Care Providers     Provider Role Specialty Phone number    Jose Tierney MD Referring Family Medicine [phone]

## 2022-10-19 ENCOUNTER — ANTICOAGULATION THERAPY VISIT (OUTPATIENT)
Dept: ANTICOAGULATION | Facility: CLINIC | Age: 27
End: 2022-10-19

## 2022-10-19 ENCOUNTER — LAB (OUTPATIENT)
Dept: LAB | Facility: CLINIC | Age: 27
End: 2022-10-19
Payer: COMMERCIAL

## 2022-10-19 DIAGNOSIS — Z83.2 FAMILY HISTORY OF CLOTTING DISORDER: ICD-10-CM

## 2022-10-19 DIAGNOSIS — R76.0 ANTIPHOSPHOLIPID ANTIBODY POSITIVE: ICD-10-CM

## 2022-10-19 DIAGNOSIS — Z79.01 LONG TERM CURRENT USE OF ANTICOAGULANTS WITH INR GOAL OF 2.0-3.0: ICD-10-CM

## 2022-10-19 DIAGNOSIS — R76.0 ANTIPHOSPHOLIPID ANTIBODY POSITIVE: Primary | ICD-10-CM

## 2022-10-19 DIAGNOSIS — I82.4Y3 ACUTE DEEP VEIN THROMBOSIS (DVT) OF PROXIMAL END OF BOTH LOWER EXTREMITIES (H): ICD-10-CM

## 2022-10-19 LAB — INR PPP: 2.53 (ref 0.85–1.15)

## 2022-10-19 PROCEDURE — 36415 COLL VENOUS BLD VENIPUNCTURE: CPT

## 2022-10-19 PROCEDURE — 85610 PROTHROMBIN TIME: CPT

## 2022-10-20 NOTE — PROGRESS NOTES
ANTICOAGULATION MANAGEMENT     Ronna Coleman 26 year old female is on warfarin with therapeutic INR result. (Goal INR 2.0-3.0)    Recent labs: (last 7 days)     10/19/22  1125   INR 2.53*       ASSESSMENT       Source(s): Chart Review and Patient/Caregiver Call       Warfarin doses taken: Warfarin taken as instructed    Diet: No new diet changes identified    New illness, injury, or hospitalization: No    Medication/supplement changes: None noted    Signs or symptoms of bleeding or clotting: No    Previous INR: Therapeutic last 2(+) visits    Additional findings: None       PLAN     Recommended plan for no diet, medication or health factor changes affecting INR     Dosing Instructions: Continue your current warfarin dose with next INR in 10 days       Summary  As of 10/19/2022    Full warfarin instructions:  10 mg every Thu; 7.5 mg all other days; Starting 10/19/2022   Next INR check:  10/28/2022             Telephone call with Ronna who verbalizes understanding and agrees to plan and who agrees to plan and repeated back plan correctly    Lab visit scheduled    Education provided:     Please call back if any changes to your diet, medications or how you've been taking warfarin    Plan made per [facility] protocol    Jl Briseno RN  Anticoagulation Clinic  10/20/2022    _______________________________________________________________________     Anticoagulation Episode Summary     Current INR goal:  2.0-3.0   TTR:  100.0 % (3.6 wk)   Target end date:  9/30/2023   Send INR reminders to:  [facility]    Indications    Antiphospholipid antibody positive [R76.0]  Long term current use of anticoagulants with INR goal of 2.0-3.0 [Z79.01]  Acute deep vein thrombosis (DVT) of proximal end of both lower extremities (H) [I82.4Y3]           Comments:           Anticoagulation Care Providers     Provider Role Specialty Phone number    Jose Tierney MD Referring Family Medicine [phone]

## 2022-10-28 ENCOUNTER — LAB (OUTPATIENT)
Dept: LAB | Facility: CLINIC | Age: 27
End: 2022-10-28
Payer: COMMERCIAL

## 2022-10-28 ENCOUNTER — ANTICOAGULATION THERAPY VISIT (OUTPATIENT)
Dept: ANTICOAGULATION | Facility: CLINIC | Age: 27
End: 2022-10-28

## 2022-10-28 DIAGNOSIS — I82.4Y3 ACUTE DEEP VEIN THROMBOSIS (DVT) OF PROXIMAL END OF BOTH LOWER EXTREMITIES (H): ICD-10-CM

## 2022-10-28 DIAGNOSIS — Z79.01 LONG TERM CURRENT USE OF ANTICOAGULANTS WITH INR GOAL OF 2.0-3.0: ICD-10-CM

## 2022-10-28 DIAGNOSIS — Z83.2 FAMILY HISTORY OF CLOTTING DISORDER: ICD-10-CM

## 2022-10-28 DIAGNOSIS — R76.0 ANTIPHOSPHOLIPID ANTIBODY POSITIVE: Primary | ICD-10-CM

## 2022-10-28 DIAGNOSIS — R76.0 ANTIPHOSPHOLIPID ANTIBODY POSITIVE: ICD-10-CM

## 2022-10-28 LAB — INR PPP: 2.76 (ref 0.85–1.15)

## 2022-10-28 PROCEDURE — 85610 PROTHROMBIN TIME: CPT

## 2022-10-28 PROCEDURE — 36415 COLL VENOUS BLD VENIPUNCTURE: CPT

## 2022-10-28 NOTE — PROGRESS NOTES
ANTICOAGULATION MANAGEMENT     Ronna Coleman 26 year old female is on warfarin with therapeutic INR result. (Goal INR 2.0-3.0)    Recent labs: (last 7 days)     10/28/22  1004   INR 2.76*       ASSESSMENT       Source(s): Chart Review    Previous INR was Therapeutic last 2(+) visits    Medication, diet, health changes since last INR chart reviewed; none identified    I left a detailed voicemail with the orders reflected in flowsheet. I have also requested a call back if there have been any missed doses, concerns, illness, fever, or if there have been any changes in medications, activity level, or diet      MyChart sent        PLAN     Recommended plan for no diet, medication or health factor changes affecting INR     Dosing Instructions: Continue your current warfarin dose with next INR in 2 weeks       Summary  As of 10/28/2022    Full warfarin instructions:  10 mg every Thu; 7.5 mg all other days; Starting 10/28/2022   Next INR check:  11/11/2022             Detailed voice message left for Ronna with dosing instructions and follow up date.     Lab visit scheduled    Education provided:     Contact [phone]  with any changes, questions or concerns.     Plan made per [facility]agulation protocol    Kristina Salinas, RN  Anticoagulation Clinic  10/28/2022    _______________________________________________________________________     Anticoagulation Episode Summary     Current INR goal:  2.0-3.0   TTR:  100.0 % (1.1 mo)   Target end date:  9/30/2023   Send INR reminders to:  [facility]    Indications    Antiphospholipid antibody positive [R76.0]  Long term current use of anticoagulants with INR goal of 2.0-3.0 [Z79.01]  Acute deep vein thrombosis (DVT) of proximal end of both lower extremities (H) [I82.4Y3]           Comments:           Anticoagulation Care Providers     Provider Role Specialty Phone number    Jose Tierney MD Referring Family Medicine [phone]

## 2022-11-04 ASSESSMENT — ENCOUNTER SYMPTOMS
BREAST MASS: 0
ARTHRALGIAS: 0
HEARTBURN: 0
CHILLS: 0
HEADACHES: 0
COUGH: 0
MYALGIAS: 0
NAUSEA: 0
DIARRHEA: 0
SHORTNESS OF BREATH: 0
FREQUENCY: 0
FEVER: 0
DIZZINESS: 0
EYE PAIN: 0
HEMATURIA: 0
JOINT SWELLING: 0
HEMATOCHEZIA: 0
PARESTHESIAS: 0
SORE THROAT: 0
CONSTIPATION: 0
NERVOUS/ANXIOUS: 0
DYSURIA: 0
WEAKNESS: 0
PALPITATIONS: 0
ABDOMINAL PAIN: 0

## 2022-11-09 ENCOUNTER — OFFICE VISIT (OUTPATIENT)
Dept: FAMILY MEDICINE | Facility: CLINIC | Age: 27
End: 2022-11-09
Payer: COMMERCIAL

## 2022-11-09 VITALS
WEIGHT: 206.3 LBS | HEIGHT: 69 IN | OXYGEN SATURATION: 98 % | SYSTOLIC BLOOD PRESSURE: 123 MMHG | DIASTOLIC BLOOD PRESSURE: 77 MMHG | BODY MASS INDEX: 30.56 KG/M2 | HEART RATE: 77 BPM | TEMPERATURE: 98.2 F | RESPIRATION RATE: 19 BRPM

## 2022-11-09 DIAGNOSIS — L21.9 SEBORRHEIC DERMATITIS: ICD-10-CM

## 2022-11-09 DIAGNOSIS — Z12.4 CERVICAL CANCER SCREENING: ICD-10-CM

## 2022-11-09 DIAGNOSIS — Z00.00 ROUTINE GENERAL MEDICAL EXAMINATION AT A HEALTH CARE FACILITY: Primary | ICD-10-CM

## 2022-11-09 DIAGNOSIS — R76.0 ANTIPHOSPHOLIPID ANTIBODY POSITIVE: ICD-10-CM

## 2022-11-09 DIAGNOSIS — E06.3 HYPOTHYROIDISM DUE TO HASHIMOTO'S THYROIDITIS: ICD-10-CM

## 2022-11-09 DIAGNOSIS — K90.0 CELIAC DISEASE: ICD-10-CM

## 2022-11-09 DIAGNOSIS — E10.9 TYPE 1 DIABETES MELLITUS WITHOUT COMPLICATION (H): ICD-10-CM

## 2022-11-09 DIAGNOSIS — E66.9 OBESITY (BMI 30-39.9): ICD-10-CM

## 2022-11-09 PROCEDURE — 99213 OFFICE O/P EST LOW 20 MIN: CPT | Mod: 25 | Performed by: FAMILY MEDICINE

## 2022-11-09 PROCEDURE — 99395 PREV VISIT EST AGE 18-39: CPT | Performed by: FAMILY MEDICINE

## 2022-11-09 PROCEDURE — G0145 SCR C/V CYTO,THINLAYER,RESCR: HCPCS | Performed by: FAMILY MEDICINE

## 2022-11-09 RX ORDER — BETAMETHASONE DIPROPIONATE 0.5 MG/G
CREAM TOPICAL 2 TIMES DAILY
Qty: 50 G | Refills: 0 | Status: SHIPPED | OUTPATIENT
Start: 2022-11-09 | End: 2022-12-21

## 2022-11-09 RX ORDER — KETOCONAZOLE 20 MG/ML
SHAMPOO TOPICAL DAILY PRN
Qty: 120 ML | Refills: 3 | Status: SHIPPED | OUTPATIENT
Start: 2022-11-09 | End: 2024-09-27

## 2022-11-09 ASSESSMENT — ENCOUNTER SYMPTOMS
CONSTIPATION: 0
FREQUENCY: 0
HEMATOCHEZIA: 0
PARESTHESIAS: 0
HEADACHES: 0
WEAKNESS: 0
SORE THROAT: 0
NAUSEA: 0
HEMATURIA: 0
BREAST MASS: 0
ARTHRALGIAS: 0
FEVER: 0
DIZZINESS: 0
CHILLS: 0
PALPITATIONS: 0
ABDOMINAL PAIN: 0
HEARTBURN: 0
SHORTNESS OF BREATH: 0
MYALGIAS: 0
JOINT SWELLING: 0
EYE PAIN: 0
DYSURIA: 0
COUGH: 0
DIARRHEA: 0
NERVOUS/ANXIOUS: 0

## 2022-11-09 ASSESSMENT — PAIN SCALES - GENERAL: PAINLEVEL: NO PAIN (0)

## 2022-11-09 NOTE — PROGRESS NOTES
Answers for HPI/ROS submitted by the patient on 11/4/2022  Frequency of exercise:: 1 day/week  Getting at least 3 servings of Calcium per day:: Yes  Diet:: Gluten-free/reduced  Taking medications regularly:: Yes  Medication side effects:: None  Bi-annual eye exam:: Yes  Dental care twice a year:: Yes  Sleep apnea or symptoms of sleep apnea:: None  abdominal pain: No  Blood in stool: No  Blood in urine: No  chest pain: No  chills: No  congestion: No  constipation: No  cough: No  diarrhea: No  dizziness: No  ear pain: No  eye pain: No  nervous/anxious: No  fever: No  frequency: No  genital sores: No  headaches: No  hearing loss: No  heartburn: No  arthralgias: No  joint swelling: No  peripheral edema: No  mood changes: No  myalgias: No  nausea: No  dysuria: No  palpitations: No  Skin sensation changes: No  sore throat: No  urgency: No  rash: No  shortness of breath: No  visual disturbance: No  weakness: No  pelvic pain: No  vaginal bleeding: No  vaginal discharge: No  tenderness: No  breast mass: No  breast discharge: No  Additional concerns today:: Yes  Duration of exercise:: 15-30 minutes       SUBJECTIVE:   CC: Ronna is an 26 year old who presents for preventive health visit.     Patient is here for annual physical and with other concerns as mentioned below  Seborrheic dermatitis-complaining of erythematous slightly itchy dry rash around both eyes associated with some scaliness and with no associated concerns for loss of eyebrow hair, eyelashes, scalp itching, dandruff, previous similar symptoms in the past symptom duration-for the past few weeks patient has tried Over-the-counter moisturizers and natural oils to help with no relief of symptoms  Past medical history significant for celiac disease, type 1 diabetes and hypothyroidism, antiphospholipid syndrome, on chronic anticoagulation  Patient has been advised of split billing requirements and indicates understanding: Yes  Healthy Habits:     Getting at least 3  servings of Calcium per day:  Yes    Bi-annual eye exam:  Yes    Dental care twice a year:  Yes    Sleep apnea or symptoms of sleep apnea:  None    Diet:  Gluten-free/reduced    Frequency of exercise:  1 day/week    Duration of exercise:  15-30 minutes    Taking medications regularly:  Yes    Medication side effects:  None    PHQ-2 Total Score: 0    Additional concerns today:  Yes              Today's PHQ-2 Score:   PHQ-2 ( 1999 [employer]) 11/4/2022   Q1: Little interest or pleasure in doing things 0   Q2: Feeling down, depressed or hopeless 0   PHQ-2 Score 0   PHQ-2 Total Score (12-17 Years)- Positive if 3 or more points; Administer PHQ-A if positive -   Q1: Little interest or pleasure in doing things Not at all   Q2: Feeling down, depressed or hopeless Not at all   PHQ-2 Score 0       Abuse: Current or Past (Physical, Sexual or Emotional) - No  Do you feel safe in your environment? Yes    Have you ever done Advance Care Planning? (For example, a Health Directive, POLST, or a discussion with a medical provider or your loved ones about your wishes): No, advance care planning information given to patient to review.  Patient declined advance care planning discussion at this time.    Social History     Tobacco Use     Smoking status: Never     Smokeless tobacco: Never   Substance Use Topics     Alcohol use: Not Currently     If you drink alcohol do you typically have >3 drinks per day or >7 drinks per week? No    Alcohol Use 11/4/2022   Prescreen: >3 drinks/day or >7 drinks/week? No   No flowsheet data found.    Reviewed orders with patient.  Reviewed health maintenance and updated orders accordingly - Yes  Lab work is in process  Labs reviewed in EPIC  BP Readings from Last 3 Encounters:   11/09/22 123/77   10/04/19 125/75   08/27/19 111/74    Wt Readings from Last 3 Encounters:   11/09/22 93.6 kg (206 lb 4.8 oz)   10/04/19 90.3 kg (199 lb 1.6 oz)   08/27/19 87.3 kg (192 lb 6.4 oz)                  Patient Active Problem  List   Diagnosis     Hypothyroidism     Type 1 diabetes (H)     Celiac disease     Family history of clotting disorder     Acute deep vein thrombosis (DVT) of proximal end of both lower extremities (H)     Antiphospholipid antibody positive     Long term current use of anticoagulants with INR goal of 2.0-3.0     Overweight (BMI 25.0-29.9)     Seborrheic dermatitis     Obesity (BMI 30-39.9)     Past Surgical History:   Procedure Laterality Date     BIOPSY  2008     NO HISTORY OF SURGERY         Social History     Tobacco Use     Smoking status: Never     Smokeless tobacco: Never   Substance Use Topics     Alcohol use: Not Currently     Family History   Problem Relation Age of Onset     Hypertension Father      Hypertension Maternal Grandfather      Hypertension Paternal Grandfather      Diabetes Paternal Uncle      Clotting Disorder Maternal Grandmother          Current Outpatient Medications   Medication Sig Dispense Refill     augmented betamethasone dipropionate (DIPROLENE AF) 0.05 % external cream Apply topically 2 times daily Use spraingly twice daily as needed for eczema 50 g 0     enoxaparin (LOVENOX) 100 MG/ML syringe Inject 0.9 mLs (90 mg) Subcutaneous every 12 hours 12.6 mL 1     fish oil-omega-3 fatty acids 1000 MG capsule Take 1 g by mouth daily       insulin glargine (BASAGLAR KWIKPEN) 100 UNIT/ML pen Inject 45 Units Subcutaneous       Insulin Infusion Pump Supplies (MINIMED RESERVOIR 3ML) MISC Change every 3 days, 3 boxes of 10 for 90 days       insulin lispro (HUMALOG VIAL) 100 UNIT/ML vial        ketoconazole (NIZORAL) 2 % external shampoo Apply topically daily as needed for itching or irritation 120 mL 3     levothyroxine (SYNTHROID/LEVOTHROID) 150 MCG tablet Take 1 tablet (150 mcg) by mouth daily Needs visit and labs for further refills 30 tablet 0     warfarin ANTICOAGULANT (COUMADIN) 5 MG tablet TAKE 1 TABLET (5 MG) BY SUN & WED, AND TAKE 7.5 MG ALL OTHER DAYS OF THE WEEK. 187 tablet 1      warfarin ANTICOAGULANT (COUMADIN) 5 MG tablet Take 1 tablet (5 mg) by mouth daily 90 tablet 3     Allergies   Allergen Reactions     Gluten Meal GI Disturbance     Recent Labs   Lab Test 01/03/20  1432 08/27/19  1006 05/24/19  1138 05/17/19  0000 02/15/19  0000 04/13/17  0000 03/22/17  0000 07/28/16  0000   A1C  --   --   --  8.1*  --   --   --   --    LDL  --   --   --   --  100  --  119 143*   HDL  --   --   --   --  43  --  42  --    TRIG  --   --   --   --  52  --  176*  --    ALT  --   --  24  --   --   --   --   --    CR  --   --  0.66  --  0.69   < >  --  0.64   GFRESTIMATED  --   --  >90  --  >60   < >  --  >60   GFRESTBLACK  --   --  >90  --  >60   < >  --  >60   POTASSIUM  --   --  4.0  --   --   --   --   --    TSH 1.59 4.93* 9.08*  --  5.01*   < >  --  1.89    < > = values in this interval not displayed.        Breast Cancer Screening:        History of abnormal Pap smear: NO - age 21-29 PAP every 3 years recommended  PAP / HPV 8/27/2019   PAP (Historical) NIL     Reviewed and updated as needed this visit by clinical staff   Tobacco  Allergies  Meds   Med Hx  Surg Hx  Fam Hx  Soc Hx        Reviewed and updated as needed this visit by Provider                   Past Medical History:   Diagnosis Date     Celiac disease      Hypothyroidism      Type 1 diabetes (H)       Past Surgical History:   Procedure Laterality Date     BIOPSY  2008     NO HISTORY OF SURGERY       OB History   No obstetric history on file.       Review of Systems   Constitutional: Negative for chills and fever.   HENT: Negative for congestion, ear pain, hearing loss and sore throat.    Eyes: Negative for pain and visual disturbance.   Respiratory: Negative for cough and shortness of breath.    Cardiovascular: Negative for chest pain, palpitations and peripheral edema.   Gastrointestinal: Negative for abdominal pain, constipation, diarrhea, heartburn, hematochezia and nausea.   Breasts:  Negative for tenderness, breast mass and  "discharge.   Genitourinary: Negative for dysuria, frequency, genital sores, hematuria, pelvic pain, urgency, vaginal bleeding and vaginal discharge.   Musculoskeletal: Negative for arthralgias, joint swelling and myalgias.   Skin: Negative for rash.   Neurological: Negative for dizziness, weakness, headaches and paresthesias.   Psychiatric/Behavioral: Negative for mood changes. The patient is not nervous/anxious.      CONSTITUTIONAL: NEGATIVE for fever, chills, change in weight  INTEGUMENTARY/SKIN: as above  EYES: NEGATIVE for vision changes or irritation  ENT: NEGATIVE for ear, mouth and throat problems  RESP: NEGATIVE for significant cough or SOB  BREAST: NEGATIVE for masses, tenderness or discharge  CV: NEGATIVE for chest pain, palpitations or peripheral edema  GI: NEGATIVE for nausea, abdominal pain, heartburn, or change in bowel habits  GI: History of celiac disease  : NEGATIVE for unusual urinary or vaginal symptoms. Periods are regular.  MUSCULOSKELETAL: NEGATIVE for significant arthralgias or myalgia  NEURO: NEGATIVE for weakness, dizziness or paresthesias  ENDOCRINE: NEGATIVE for temperature intolerance, skin/hair changes  ENDOCRINE: History of type 1 diabetes and hypothyroidism  HEME/ALLERGY/IMMUNE: NEGATIVE for bleeding problems  HEME/ALLERGY/IMMUNE: History of antiphospholipid syndrome, on chronic anticoagulation  PSYCHIATRIC: NEGATIVE for changes in mood or affect     OBJECTIVE:   /77 (BP Location: Right arm, Patient Position: Sitting, Cuff Size: Adult Large)   Pulse 77   Temp 98.2  F (36.8  C) (Oral)   Resp 19   Ht 1.754 m (5' 9.06\")   Wt 93.6 kg (206 lb 4.8 oz)   LMP 10/17/2022 (Exact Date)   SpO2 98%   BMI 30.42 kg/m    Physical Exam  GENERAL: healthy, alert and no distress  EYES: Eyes grossly normal to inspection, PERRL and conjunctivae and sclerae normal  HENT: ear canals and TM's normal, nose and mouth without ulcers or lesions  NECK: no adenopathy, no asymmetry, masses, or scars " and thyroid normal to palpation  RESP: lungs clear to auscultation - no rales, rhonchi or wheezes  BREAST: normal without masses, tenderness or nipple discharge and no palpable axillary masses or adenopathy  CV: regular rate and rhythm, normal S1 S2, no S3 or S4, no murmur, click or rub, no peripheral edema and peripheral pulses strong  ABDOMEN: soft, nontender, no hepatosplenomegaly, no masses and bowel sounds normal   (female): normal female external genitalia, normal urethral meatus, vaginal mucosa pink, moist, well rugated, and normal cervix/adnexa/uterus without masses or discharge  MS: no gross musculoskeletal defects noted, no edema  SKIN: Markedly erythematous, scaly dry rash around the upper and lower eyelids on both sides  Eyes-spared  NEURO: Normal strength and tone, mentation intact and speech normal  PSYCH: mentation appears normal, affect normal/bright    Diagnostic Test Results:  Labs reviewed in Epic    ASSESSMENT/PLAN:   (Z00.00) Routine general medical examination at a health care facility  (primary encounter diagnosis)  Comment:   Plan: Discussed on regular exercises, healthy eating, self breast exams monthly and routine dental checks.      (L21.9) Seborrheic dermatitis  Comment:   Plan: ketoconazole (NIZORAL) 2 % external shampoo,         augmented betamethasone dipropionate (DIPROLENE        AF) 0.05 % external cream        Reviewed etiology of the skin condition  Gave education handouts on the same  Recommended to try Nizoral shampoo over the eyelashes, eyebrows and on the scalp every other day for 2 weeks along with topical Diprolene cream sparingly twice daily over the affected area for up to 2 weeks  If no improvement noted by then, patient understands to call to schedule for a dermatology consult for further evaluation.  Dosing and potential medication side effects discussed.  Patient verbalised understanding and is agreeable to the plan.    (Z12.4) Cervical cancer screening  Comment:  "  Plan: Pap Screen only - recommended age 21 - 24 years            (R76.0) Antiphospholipid antibody positive  Comment:   Plan: On chronic anticoagulation    (E03.8,  E06.3) Hypothyroidism due to Hashimoto's thyroiditis  Comment:   Plan: Continue to follow-up with endocrinology team    (E10.9) Type 1 diabetes mellitus without complication (H)  Comment:   Plan: as above      (K90.0) Celiac disease  Comment:   Plan: Continue with gluten-free diet          (E66.9) Obesity (BMI 30-39.9)  Comment:   Plan:   Wt Readings from Last 5 Encounters:   11/09/22 93.6 kg (206 lb 4.8 oz)   10/04/19 90.3 kg (199 lb 1.6 oz)   08/27/19 87.3 kg (192 lb 6.4 oz)   06/11/19 88 kg (193 lb 15 oz)   05/24/19 88.9 kg (196 lb)     Emphasized on weight loss, portion control, low calorie and low fat diet, healthy eating, regular exercises.            COUNSELING:  Reviewed preventive health counseling, as reflected in patient instructions  Special attention given to:        Regular exercise       Healthy diet/nutrition       Vision screening       Immunizations    Declined: Influenza, COVID, pneumococcal vaccine due to Concerns about side effects/safety            Estimated body mass index is 30.42 kg/m  as calculated from the following:    Height as of this encounter: 1.754 m (5' 9.06\").    Weight as of this encounter: 93.6 kg (206 lb 4.8 oz).    Weight management plan: Discussed healthy diet and exercise guidelines    She reports that she has never smoked. She has never used smokeless tobacco.      Counseling Resources:  ATP IV Guidelines  Pooled Cohorts Equation Calculator  Breast Cancer Risk Calculator  BRCA-Related Cancer Risk Assessment: FHS-7 Tool  FRAX Risk Assessment  ICSI Preventive Guidelines  Dietary Guidelines for Americans, 2010  USDA's MyPlate  ASA Prophylaxis  Lung CA Screening    Jose Tierney MD  [facility]  Chart documentation done in part with Dragon Voice recognition Software. Although reviewed " after completion, some word and grammatical error may remain.

## 2022-11-11 ENCOUNTER — MYC MEDICAL ADVICE (OUTPATIENT)
Dept: ANTICOAGULATION | Facility: CLINIC | Age: 27
End: 2022-11-11

## 2022-11-11 ENCOUNTER — ANTICOAGULATION THERAPY VISIT (OUTPATIENT)
Dept: ANTICOAGULATION | Facility: CLINIC | Age: 27
End: 2022-11-11

## 2022-11-11 ENCOUNTER — LAB (OUTPATIENT)
Dept: LAB | Facility: CLINIC | Age: 27
End: 2022-11-11
Payer: COMMERCIAL

## 2022-11-11 DIAGNOSIS — Z79.01 LONG TERM CURRENT USE OF ANTICOAGULANTS WITH INR GOAL OF 2.0-3.0: ICD-10-CM

## 2022-11-11 DIAGNOSIS — I82.4Y3 ACUTE DEEP VEIN THROMBOSIS (DVT) OF PROXIMAL END OF BOTH LOWER EXTREMITIES (H): ICD-10-CM

## 2022-11-11 DIAGNOSIS — I82.491 ACUTE DEEP VEIN THROMBOSIS (DVT) OF OTHER SPECIFIED VEIN OF RIGHT LOWER EXTREMITY (H): ICD-10-CM

## 2022-11-11 DIAGNOSIS — R76.0 ANTIPHOSPHOLIPID ANTIBODY POSITIVE: Primary | ICD-10-CM

## 2022-11-11 DIAGNOSIS — R76.0 ANTIPHOSPHOLIPID ANTIBODY POSITIVE: ICD-10-CM

## 2022-11-11 DIAGNOSIS — Z83.2 FAMILY HISTORY OF CLOTTING DISORDER: ICD-10-CM

## 2022-11-11 LAB
BKR LAB AP GYN ADEQUACY: NORMAL
BKR LAB AP GYN INTERPRETATION: NORMAL
BKR LAB AP HPV REFLEX: NO
BKR LAB AP LMP: NORMAL
BKR LAB AP PREVIOUS ABNORMAL: NORMAL
INR PPP: 2.3 (ref 0.85–1.15)
PATH REPORT.COMMENTS IMP SPEC: NORMAL
PATH REPORT.COMMENTS IMP SPEC: NORMAL
PATH REPORT.RELEVANT HX SPEC: NORMAL

## 2022-11-11 PROCEDURE — 85610 PROTHROMBIN TIME: CPT

## 2022-11-11 PROCEDURE — 36415 COLL VENOUS BLD VENIPUNCTURE: CPT

## 2022-11-11 RX ORDER — WARFARIN SODIUM 5 MG/1
TABLET ORAL
Qty: 140 TABLET | Refills: 1 | Status: SHIPPED | OUTPATIENT
Start: 2022-11-11 | End: 2023-05-08

## 2022-11-11 NOTE — TELEPHONE ENCOUNTER
ANTICOAGULATION MANAGEMENT:  Medication Refill    Anticoagulation Summary  As of 11/11/2022    Warfarin maintenance plan:  10 mg (5 mg x 2) every Thu; 7.5 mg (5 mg x 1.5) all other days; Starting 11/11/2022   Next INR check:  12/2/2022   Target end date:  9/30/2023    Indications    Antiphospholipid antibody positive [R76.0]  Long term current use of anticoagulants with INR goal of 2.0-3.0 [Z79.01]  Acute deep vein thrombosis (DVT) of proximal end of both lower extremities (H) [I82.4Y3]             Anticoagulation Care Providers     Provider Role Specialty Phone number    Jose Tierney MD Referring Family Medicine [phone]          Visit with referring provider/group within last year: Yes    [facility] referral signed within last year: Yes    Ronna meets all criteria for refill (current [facility] referral, office visit with referring provider/group in last year, lab monitoring up to date or not exceeding 2 weeks overdue). Rx instructions and quantity supplied updated to match patient's current dosing plan. Warfarin 90 day supply with 1 refill granted per [facility] protocol     Kristina Salinas, RN  Anticoagulation Clinic

## 2022-11-11 NOTE — PROGRESS NOTES
ANTICOAGULATION MANAGEMENT     Ronna Coleman 26 year old female is on warfarin with therapeutic INR result. (Goal INR 2.0-3.0)    Recent labs: (last 7 days)     11/11/22  0957   INR 2.30*       ASSESSMENT       Source(s): Chart Review    Previous INR was Therapeutic last 2(+) visits    Medication, diet, health changes since last INR chart reviewed; none identified    Mailbox was full - unable to leave a VM. [employer] message sent.              PLAN     Recommended plan for no diet, medication or health factor changes affecting INR     Dosing Instructions: Continue your current warfarin dose with next INR in 3 weeks       Summary  As of 11/11/2022    Full warfarin instructions:  10 mg every Thu; 7.5 mg all other days; Starting 11/11/2022   Next INR check:  12/2/2022             Sent U-NOTE message with dosing and follow up instructions    Contact [phone]  to schedule and with any changes, questions or concerns.     Education provided:     Please call back if any changes to your diet, medications or how you've been taking warfarin    Plan made per [facility] anticoagulation protocol    Kristina Salinas, RN  Anticoagulation Clinic  11/11/2022    _______________________________________________________________________     Anticoagulation Episode Summary     Current INR goal:  2.0-3.0   TTR:  100.0 % (1.6 mo)   Target end date:  9/30/2023   Send INR reminders to:  [facility] [place]    Indications    Antiphospholipid antibody positive [R76.0]  Long term current use of anticoagulants with INR goal of 2.0-3.0 [Z79.01]  Acute deep vein thrombosis (DVT) of proximal end of both lower extremities (H) [I82.4Y3]           Comments:           Anticoagulation Care Providers     Provider Role Specialty Phone number    Jose Teirney MD Referring Family Medicine [phone]

## 2022-12-07 ENCOUNTER — LAB (OUTPATIENT)
Dept: LAB | Facility: CLINIC | Age: 27
End: 2022-12-07
Payer: COMMERCIAL

## 2022-12-07 DIAGNOSIS — R76.0 ANTIPHOSPHOLIPID ANTIBODY POSITIVE: ICD-10-CM

## 2022-12-07 DIAGNOSIS — Z83.2 FAMILY HISTORY OF CLOTTING DISORDER: ICD-10-CM

## 2022-12-07 DIAGNOSIS — Z79.01 LONG TERM CURRENT USE OF ANTICOAGULANTS WITH INR GOAL OF 2.0-3.0: ICD-10-CM

## 2022-12-07 DIAGNOSIS — I82.4Y3 ACUTE DEEP VEIN THROMBOSIS (DVT) OF PROXIMAL END OF BOTH LOWER EXTREMITIES (H): ICD-10-CM

## 2022-12-07 LAB — INR PPP: 1.83 (ref 0.85–1.15)

## 2022-12-07 PROCEDURE — 36415 COLL VENOUS BLD VENIPUNCTURE: CPT

## 2022-12-07 PROCEDURE — 85610 PROTHROMBIN TIME: CPT

## 2022-12-08 ENCOUNTER — ANTICOAGULATION THERAPY VISIT (OUTPATIENT)
Dept: ANTICOAGULATION | Facility: CLINIC | Age: 27
End: 2022-12-08

## 2022-12-08 DIAGNOSIS — Z79.01 LONG TERM CURRENT USE OF ANTICOAGULANTS WITH INR GOAL OF 2.0-3.0: ICD-10-CM

## 2022-12-08 DIAGNOSIS — R76.0 ANTIPHOSPHOLIPID ANTIBODY POSITIVE: Primary | ICD-10-CM

## 2022-12-08 DIAGNOSIS — I82.4Y3 ACUTE DEEP VEIN THROMBOSIS (DVT) OF PROXIMAL END OF BOTH LOWER EXTREMITIES (H): ICD-10-CM

## 2022-12-08 NOTE — PROGRESS NOTES
ANTICOAGULATION MANAGEMENT     Ronna Coleman 26 year old female is on warfarin with subtherapeutic INR result. (Goal INR 2.0-3.0)    Recent labs: (last 7 days)     12/07/22  1217   INR 1.83*       ASSESSMENT       Source(s): Chart Review    Previous INR was Therapeutic last 2(+) visits    Medication, diet, health changes since last INR chart reviewed; none identified    I left a detailed voicemail with the orders reflected in flowsheet. I have also requested a call back if there have been any missed doses, concerns, illness, fever, or if there have been any changes in medications, activity level, or diet    MyChart message sent        PLAN     Recommended plan for no diet, medication or health factor changes affecting INR     Dosing Instructions: booster dose then continue your current warfarin dose with next INR in 2 weeks       Summary  As of 12/8/2022    Full warfarin instructions:  12/8: 12.5 mg; Otherwise 10 mg every Thu; 7.5 mg all other days; Starting 12/8/2022   Next INR check:  12/21/2022             Detailed voice message left for Ronna with dosing instructions and follow up date.     Contact [phone]  to schedule and with any changes, questions or concerns.     Education provided:     Please call back if any changes to your diet, medications or how you've been taking warfarin    Goal range and lab monitoring: goal range and significance of current result, Importance of therapeutic range and Importance of following up at instructed interval    Dietary considerations: importance of consistent vitamin K intake    Plan made per [facility] anticoagulation protocol    Kristina Salinas, RN  Anticoagulation Clinic  12/8/2022    _______________________________________________________________________     Anticoagulation Episode Summary     Current INR goal:  2.0-3.0   TTR:  87.3 % (2.5 mo)   Target end date:  9/30/2023   Send INR reminders to:  [facility] [place]    Indications    Antiphospholipid antibody  positive [R76.0]  Long term current use of anticoagulants with INR goal of 2.0-3.0 [Z79.01]  Acute deep vein thrombosis (DVT) of proximal end of both lower extremities (H) [I82.4Y3]           Comments:           Anticoagulation Care Providers     Provider Role Specialty Phone number    Jose Tierney MD Referring Family Medicine [phone]

## 2022-12-20 DIAGNOSIS — L21.9 SEBORRHEIC DERMATITIS: ICD-10-CM

## 2022-12-21 RX ORDER — BETAMETHASONE DIPROPIONATE 0.5 MG/G
CREAM TOPICAL 2 TIMES DAILY
Qty: 50 G | Refills: 0 | Status: SHIPPED | OUTPATIENT
Start: 2022-12-21 | End: 2024-09-27

## 2022-12-23 ENCOUNTER — DOCUMENTATION ONLY (OUTPATIENT)
Dept: ANTICOAGULATION | Facility: CLINIC | Age: 27
End: 2022-12-23

## 2022-12-23 NOTE — PROGRESS NOTES
ANTICOAGULATION     Ronna Coleman is overdue for INR check.     "[employer] Message sent    Mitzy Angeles RN

## 2022-12-30 ENCOUNTER — TELEPHONE (OUTPATIENT)
Dept: ANTICOAGULATION | Facility: CLINIC | Age: 27
End: 2022-12-30

## 2022-12-30 NOTE — TELEPHONE ENCOUNTER
ANTICOAGULATION     Ronna Coleman is overdue for INR check.      Left message for patient to call and schedule lab appointment as soon as possible. If returning call, please schedule.     Jl Briseno RN

## 2023-01-06 ENCOUNTER — DOCUMENTATION ONLY (OUTPATIENT)
Dept: ANTICOAGULATION | Facility: CLINIC | Age: 28
End: 2023-01-06

## 2023-01-06 NOTE — PROGRESS NOTES
ANTICOAGULATION     Ronna Coleman is overdue for INR check.      Reminder letter sent    Elizabeth Kimble RN

## 2023-01-06 NOTE — LETTER
[facility]  [address] [facility] [address]  Phone: [phone]  Fax: [phone]   January 6, 2023        Ronna Coleman  [address] [place] [address]  [facility] [address]            Dear Ronna,    You are currently under the care of [facility] for your warfarin (Coumadin , Jantoven ) therapy.  We are contacting you because our records show you were due for an INR on 12-.    There are potentially serious risks when taking warfarin without careful monitoring and we want to make sure you are safely managed.  Routine lab monitoring is required for warfarin refills.     Please call [phone]  as soon as possible to schedule an appointment.  If there has been a change in your care or other concerns, please let us know so we can help and or update our records.     Sincerely,       [facility]

## 2023-01-09 ENCOUNTER — APPOINTMENT (OUTPATIENT)
Dept: LAB | Facility: CLINIC | Age: 28
End: 2023-01-09
Payer: COMMERCIAL

## 2023-01-09 ENCOUNTER — ANTICOAGULATION THERAPY VISIT (OUTPATIENT)
Dept: ANTICOAGULATION | Facility: CLINIC | Age: 28
End: 2023-01-09

## 2023-01-09 DIAGNOSIS — R76.0 ANTIPHOSPHOLIPID ANTIBODY POSITIVE: Primary | ICD-10-CM

## 2023-01-09 DIAGNOSIS — I82.4Y3 ACUTE DEEP VEIN THROMBOSIS (DVT) OF PROXIMAL END OF BOTH LOWER EXTREMITIES (H): ICD-10-CM

## 2023-01-09 DIAGNOSIS — Z79.01 LONG TERM CURRENT USE OF ANTICOAGULANTS WITH INR GOAL OF 2.0-3.0: ICD-10-CM

## 2023-01-09 LAB — INR PPP: 2.18 (ref 0.85–1.15)

## 2023-01-09 PROCEDURE — 36415 COLL VENOUS BLD VENIPUNCTURE: CPT

## 2023-01-09 PROCEDURE — 85610 PROTHROMBIN TIME: CPT

## 2023-02-13 ENCOUNTER — MYC MEDICAL ADVICE (OUTPATIENT)
Dept: ANTICOAGULATION | Facility: CLINIC | Age: 28
End: 2023-02-13
Payer: COMMERCIAL

## 2023-02-13 NOTE — TELEPHONE ENCOUNTER
ANTICOAGULATION     Ronna Coleman is overdue for INR check.      Mychart reminder sent    Michelle Ye RN

## 2023-02-16 ENCOUNTER — LAB (OUTPATIENT)
Dept: LAB | Facility: CLINIC | Age: 28
End: 2023-02-16
Payer: COMMERCIAL

## 2023-02-16 ENCOUNTER — ANTICOAGULATION THERAPY VISIT (OUTPATIENT)
Dept: ANTICOAGULATION | Facility: CLINIC | Age: 28
End: 2023-02-16

## 2023-02-16 DIAGNOSIS — R76.0 ANTIPHOSPHOLIPID ANTIBODY POSITIVE: ICD-10-CM

## 2023-02-16 DIAGNOSIS — Z79.01 LONG TERM CURRENT USE OF ANTICOAGULANTS WITH INR GOAL OF 2.0-3.0: ICD-10-CM

## 2023-02-16 DIAGNOSIS — I82.4Y3 ACUTE DEEP VEIN THROMBOSIS (DVT) OF PROXIMAL END OF BOTH LOWER EXTREMITIES (H): ICD-10-CM

## 2023-02-16 DIAGNOSIS — Z83.2 FAMILY HISTORY OF CLOTTING DISORDER: ICD-10-CM

## 2023-02-16 DIAGNOSIS — R76.0 ANTIPHOSPHOLIPID ANTIBODY POSITIVE: Primary | ICD-10-CM

## 2023-02-16 LAB — INR PPP: 1.99 (ref 0.85–1.15)

## 2023-02-16 PROCEDURE — 85610 PROTHROMBIN TIME: CPT

## 2023-02-16 PROCEDURE — 36415 COLL VENOUS BLD VENIPUNCTURE: CPT

## 2023-02-16 NOTE — PROGRESS NOTES
ANTICOAGULATION MANAGEMENT     Ronna Coleman 27 year old female is on warfarin with therapeutic INR result. (Goal INR 2.0-3.0)    Recent labs: (last 7 days)     02/16/23  1051   INR 1.99*       ASSESSMENT       Source(s): Chart Review    Previous INR was Therapeutic last visit; previously outside of goal range    Medication, diet, health changes since last INR chart reviewed; none identified           PLAN     Recommended plan for no diet, medication or health factor changes affecting INR     Dosing Instructions: Continue your current warfarin dose with next INR in 2 weeks       Summary  As of 2/16/2023    Full warfarin instructions:  10 mg every Thu; 7.5 mg all other days   Next INR check:  3/2/2023             Detailed voice message left for Ronna with dosing instructions and follow up date.   Sent [employer] message with dosing and follow up instructions    Contact [phone]  to schedule and with any changes, questions or concerns.     Education provided:     Please call back if any changes to your diet, medications or how you've been taking warfarin    Contact [phone]  with any changes, questions or concerns.     Plan made per [facility] anticoagulation protocol    Elizabeth Kimble RN  Anticoagulation Clinic  2/16/2023    _______________________________________________________________________     Anticoagulation Episode Summary     Current INR goal:  2.0-3.0   TTR:  81.1 % (4.8 mo)   Target end date:  9/30/2023   Send INR reminders to:  [facility] [place]    Indications    Antiphospholipid antibody positive [R76.0]  Long term current use of anticoagulants with INR goal of 2.0-3.0 [Z79.01]  Acute deep vein thrombosis (DVT) of proximal end of both lower extremities (H) [I82.4Y3]           Comments:           Anticoagulation Care Providers     Provider Role Specialty Phone number    Jose Tierney MD Referring Family Medicine [phone]

## 2023-03-09 ENCOUNTER — MYC MEDICAL ADVICE (OUTPATIENT)
Dept: ANTICOAGULATION | Facility: CLINIC | Age: 28
End: 2023-03-09
Payer: COMMERCIAL

## 2023-03-20 ENCOUNTER — LAB (OUTPATIENT)
Dept: LAB | Facility: CLINIC | Age: 28
End: 2023-03-20
Payer: COMMERCIAL

## 2023-03-20 DIAGNOSIS — Z83.2 FAMILY HISTORY OF CLOTTING DISORDER: ICD-10-CM

## 2023-03-20 DIAGNOSIS — R76.0 ANTIPHOSPHOLIPID ANTIBODY POSITIVE: ICD-10-CM

## 2023-03-20 DIAGNOSIS — I82.4Y3 ACUTE DEEP VEIN THROMBOSIS (DVT) OF PROXIMAL END OF BOTH LOWER EXTREMITIES (H): ICD-10-CM

## 2023-03-20 DIAGNOSIS — Z79.01 LONG TERM CURRENT USE OF ANTICOAGULANTS WITH INR GOAL OF 2.0-3.0: ICD-10-CM

## 2023-03-20 LAB — INR PPP: 2.39 (ref 0.85–1.15)

## 2023-03-20 PROCEDURE — 85610 PROTHROMBIN TIME: CPT

## 2023-03-20 PROCEDURE — 36415 COLL VENOUS BLD VENIPUNCTURE: CPT

## 2023-03-21 ENCOUNTER — ANTICOAGULATION THERAPY VISIT (OUTPATIENT)
Dept: ANTICOAGULATION | Facility: CLINIC | Age: 28
End: 2023-03-21
Payer: COMMERCIAL

## 2023-03-21 DIAGNOSIS — R76.0 ANTIPHOSPHOLIPID ANTIBODY POSITIVE: Primary | ICD-10-CM

## 2023-03-21 DIAGNOSIS — Z79.01 LONG TERM CURRENT USE OF ANTICOAGULANTS WITH INR GOAL OF 2.0-3.0: ICD-10-CM

## 2023-03-21 DIAGNOSIS — I82.4Y3 ACUTE DEEP VEIN THROMBOSIS (DVT) OF PROXIMAL END OF BOTH LOWER EXTREMITIES (H): ICD-10-CM

## 2023-05-02 ENCOUNTER — MYC MEDICAL ADVICE (OUTPATIENT)
Dept: ANTICOAGULATION | Facility: CLINIC | Age: 28
End: 2023-05-02
Payer: COMMERCIAL

## 2023-05-06 DIAGNOSIS — I82.491 ACUTE DEEP VEIN THROMBOSIS (DVT) OF OTHER SPECIFIED VEIN OF RIGHT LOWER EXTREMITY (H): ICD-10-CM

## 2023-05-06 DIAGNOSIS — R76.0 ANTIPHOSPHOLIPID ANTIBODY POSITIVE: ICD-10-CM

## 2023-05-06 DIAGNOSIS — Z79.01 LONG TERM CURRENT USE OF ANTICOAGULANTS WITH INR GOAL OF 2.0-3.0: Primary | ICD-10-CM

## 2023-05-08 RX ORDER — WARFARIN SODIUM 5 MG/1
TABLET ORAL
Qty: 140 TABLET | Refills: 1 | Status: SHIPPED | OUTPATIENT
Start: 2023-05-08 | End: 2023-10-03

## 2023-05-08 NOTE — TELEPHONE ENCOUNTER
ANTICOAGULATION MANAGEMENT:  Medication Refill    Anticoagulation Summary  As of 3/21/2023    Warfarin maintenance plan:  10 mg (5 mg x 2) every Thu; 7.5 mg (5 mg x 1.5) all other days   Next INR check:  4/25/2023   Target end date:  9/30/2023    Indications    Antiphospholipid antibody positive [R76.0]  Long term current use of anticoagulants with INR goal of 2.0-3.0 [Z79.01]  Acute deep vein thrombosis (DVT) of proximal end of both lower extremities (H) [I82.4Y3]             Anticoagulation Care Providers     Provider Role Specialty Phone number    Jose Tierney MD Referring Family Medicine [phone]          Visit with referring provider/group within last year: Yes    [facility] referral signed within last year: Yes    Ronna meets all criteria for refill (current [facility] referral, office visit with referring provider/group in last year, lab monitoring up to date or not exceeding 2 weeks overdue). Rx instructions and quantity supplied updated to match patient's current dosing plan. Warfarin 90 day supply with 1 refill granted per [facility] protocol     Gabbie Armendariz RN  Anticoagulation Clinic

## 2023-05-11 ENCOUNTER — ANTICOAGULATION THERAPY VISIT (OUTPATIENT)
Dept: ANTICOAGULATION | Facility: CLINIC | Age: 28
End: 2023-05-11

## 2023-05-11 ENCOUNTER — LAB (OUTPATIENT)
Dept: LAB | Facility: CLINIC | Age: 28
End: 2023-05-11
Payer: COMMERCIAL

## 2023-05-11 DIAGNOSIS — Z79.01 LONG TERM CURRENT USE OF ANTICOAGULANTS WITH INR GOAL OF 2.0-3.0: ICD-10-CM

## 2023-05-11 DIAGNOSIS — R76.0 ANTIPHOSPHOLIPID ANTIBODY POSITIVE: ICD-10-CM

## 2023-05-11 DIAGNOSIS — Z83.2 FAMILY HISTORY OF CLOTTING DISORDER: ICD-10-CM

## 2023-05-11 DIAGNOSIS — R76.0 ANTIPHOSPHOLIPID ANTIBODY POSITIVE: Primary | ICD-10-CM

## 2023-05-11 DIAGNOSIS — I82.4Y3 ACUTE DEEP VEIN THROMBOSIS (DVT) OF PROXIMAL END OF BOTH LOWER EXTREMITIES (H): ICD-10-CM

## 2023-05-11 LAB — INR PPP: 1.43 (ref 0.85–1.15)

## 2023-05-11 PROCEDURE — 85610 PROTHROMBIN TIME: CPT

## 2023-05-11 PROCEDURE — 36415 COLL VENOUS BLD VENIPUNCTURE: CPT

## 2023-05-11 NOTE — PROGRESS NOTES
ANTICOAGULATION MANAGEMENT     Ronna Coleman 27 year old female is on warfarin with subtherapeutic INR result. (Goal INR 2.0-3.0)    Recent labs: (last 7 days)     05/11/23  0934   INR 1.43*       ASSESSMENT     Warfarin Lab Questionnaire    Warfarin Doses Last 7 Days      5/10/2023    10:10 PM   Dose in Tablet or Mg   TAB or MG? milligram (mg)     Pt Rptd Dose ELROY MONDAY TUESDAY WED THURS FRIDAY SATURDAY   5/10/2023  10:10 PM 7.5 7.5 7.5 7.5 10 7.5 7.5         5/10/2023   Warfarin Lab Questionnaire   Missed doses within past 14 days? No   Changes in diet or alcohol within past 14 days? No   Medication changes since last result? No   Injuries or illness since last result? No   New shortness of breath, severe headaches or sudden changes in vision since last result? No   Abnormal bleeding since last result? No   Upcoming surgery, procedure? No        Previous result: Therapeutic last visit; previously outside of goal range  Additional findings: increase in exercise and activity doing a lot of biking       PLAN     Recommended plan for ongoing change(s) affecting INR     Dosing Instructions: booster dose then Increase your warfarin dose (9.1% change) with next INR in 2 weeks       Summary  As of 5/11/2023    Full warfarin instructions:  10 mg every Thu; 7.5 mg all other days   Next INR check:               Telephone call with Ronna who verbalizes understanding and agrees to plan    Lab visit scheduled    Education provided:     Please call back if any changes to your diet, medications or how you've been taking warfarin    Plan made per [facility] anticoagulation protocol    Ayla Weir, RN  Anticoagulation Clinic  5/11/2023    _______________________________________________________________________     Anticoagulation Episode Summary     Current INR goal:  2.0-3.0   TTR:  74.3 % (7.6 mo)   Target end date:  9/30/2023   Send INR reminders to:  [facility]    Indications    Antiphospholipid antibody  positive [R76.0]  Long term current use of anticoagulants with INR goal of 2.0-3.0 [Z79.01]  Acute deep vein thrombosis (DVT) of proximal end of both lower extremities (H) [I82.4Y3]           Comments:           Anticoagulation Care Providers     Provider Role Specialty Phone number    Jose Tierney MD Referring Family Medicine [phone]

## 2023-05-26 ENCOUNTER — MYC MEDICAL ADVICE (OUTPATIENT)
Dept: ANTICOAGULATION | Facility: CLINIC | Age: 28
End: 2023-05-26
Payer: COMMERCIAL

## 2023-06-09 ENCOUNTER — DOCUMENTATION ONLY (OUTPATIENT)
Dept: ANTICOAGULATION | Facility: CLINIC | Age: 28
End: 2023-06-09
Payer: COMMERCIAL

## 2023-06-09 NOTE — LETTER
[facility]  [address] [facility] [address]  Phone: [phone]  Fax: [phone]   June 9, 2023        Ronna Coleman  [address] [place] [address]  [facility] [address]            Dear Ronna,    You are currently under the care of [facility] for your warfarin (Coumadin , Jantoven ) therapy.  We are contacting you because our records show you were due for an INR on 5-.    There are potentially serious risks when taking warfarin without careful monitoring and we want to make sure you are safely managed.  Routine lab monitoring is required for warfarin refills.     Please call [phone]  as soon as possible to schedule an appointment.  If there has been a change in your care or other concerns, please let us know so we can help and or update our records.     Sincerely,       [facility]

## 2023-06-13 ENCOUNTER — LAB (OUTPATIENT)
Dept: LAB | Facility: CLINIC | Age: 28
End: 2023-06-13
Payer: COMMERCIAL

## 2023-06-13 DIAGNOSIS — R76.0 ANTIPHOSPHOLIPID ANTIBODY POSITIVE: ICD-10-CM

## 2023-06-13 DIAGNOSIS — Z83.2 FAMILY HISTORY OF CLOTTING DISORDER: ICD-10-CM

## 2023-06-13 DIAGNOSIS — I82.4Y3 ACUTE DEEP VEIN THROMBOSIS (DVT) OF PROXIMAL END OF BOTH LOWER EXTREMITIES (H): ICD-10-CM

## 2023-06-13 DIAGNOSIS — E03.9 HYPOTHYROIDISM: Primary | ICD-10-CM

## 2023-06-13 DIAGNOSIS — Z79.01 LONG TERM CURRENT USE OF ANTICOAGULANTS WITH INR GOAL OF 2.0-3.0: ICD-10-CM

## 2023-06-13 LAB — INR PPP: 2.51 (ref 0.85–1.15)

## 2023-06-13 PROCEDURE — 85610 PROTHROMBIN TIME: CPT

## 2023-06-13 PROCEDURE — 36415 COLL VENOUS BLD VENIPUNCTURE: CPT

## 2023-06-13 PROCEDURE — 84443 ASSAY THYROID STIM HORMONE: CPT

## 2023-06-14 ENCOUNTER — ANTICOAGULATION THERAPY VISIT (OUTPATIENT)
Dept: ANTICOAGULATION | Facility: CLINIC | Age: 28
End: 2023-06-14
Payer: COMMERCIAL

## 2023-06-14 DIAGNOSIS — R76.0 ANTIPHOSPHOLIPID ANTIBODY POSITIVE: Primary | ICD-10-CM

## 2023-06-14 DIAGNOSIS — Z79.01 LONG TERM CURRENT USE OF ANTICOAGULANTS WITH INR GOAL OF 2.0-3.0: ICD-10-CM

## 2023-06-14 DIAGNOSIS — I82.4Y3 ACUTE DEEP VEIN THROMBOSIS (DVT) OF PROXIMAL END OF BOTH LOWER EXTREMITIES (H): ICD-10-CM

## 2023-06-14 LAB — TSH SERPL DL<=0.005 MIU/L-ACNC: 0.91 UIU/ML (ref 0.3–4.2)

## 2023-06-14 NOTE — PROGRESS NOTES
ANTICOAGULATION MANAGEMENT     Ronna Coleman 27 year old female is on warfarin with therapeutic INR result. (Goal INR 2.0-3.0)    Recent labs: (last 7 days)     06/13/23  1500   INR 2.51*       ASSESSMENT     Warfarin Lab Questionnaire    Warfarin Doses Last 7 Days      6/12/2023     7:39 PM   Dose in Tablet or Mg   TAB or MG? tablet (tab)     Pt Rptd Dose SUNDAY MONDAY TUESDAY WED THURS FRIDAY SATURDAY 6/12/2023   7:39 PM 1.5 1.5 2 1.5 2 1.5 2         6/12/2023   Warfarin Lab Questionnaire   Missed doses within past 14 days? No   Changes in diet or alcohol within past 14 days? No   Medication changes since last result? No   Injuries or illness since last result? No   New shortness of breath, severe headaches or sudden changes in vision since last result? No   Abnormal bleeding since last result? No   Upcoming surgery, procedure? No        Previous result: Subtherapeutic  Additional findings: None       PLAN     Recommended plan for no diet, medication or health factor changes affecting INR     Dosing Instructions: Continue your current warfarin dose with next INR in 3-4 weeks       Summary  As of 6/14/2023    Full warfarin instructions:  10 mg every Tue, Thu, Sat; 7.5 mg all other days   Next INR check:  7/5/2023             Detailed voice message left for Ronna with dosing instructions and follow up date.   Sent [employer] message with dosing and follow up instructions    Contact [phone]  to schedule and with any changes, questions or concerns.     Education provided:     Please call back if any changes to your diet, medications or how you've been taking warfarin    Plan made per [facility] anticoagulation protocol    Camilo Carrasco RN  Anticoagulation Clinic  6/14/2023    _______________________________________________________________________     Anticoagulation Episode Summary     Current INR goal:  2.0-3.0   TTR:  70.9 % (8.7 mo)   Target end date:  9/30/2023   Send INR reminders to:  [facility] [place]     Indications    Antiphospholipid antibody positive [R76.0]  Long term current use of anticoagulants with INR goal of 2.0-3.0 [Z79.01]  Acute deep vein thrombosis (DVT) of proximal end of both lower extremities (H) [I82.4Y3]           Comments:           Anticoagulation Care Providers     Provider Role Specialty Phone number    Jose Tierney MD Referring Family Medicine [phone]

## 2023-07-19 ENCOUNTER — TELEPHONE (OUTPATIENT)
Dept: ANTICOAGULATION | Facility: CLINIC | Age: 28
End: 2023-07-19
Payer: COMMERCIAL

## 2023-07-19 NOTE — TELEPHONE ENCOUNTER
ANTICOAGULATION     Ronna Coleman is overdue for INR check.      Left message for patient to call and schedule lab appointment as soon as possible. If returning call, please schedule.     Gabbie Armendariz RN

## 2023-07-20 ENCOUNTER — LAB (OUTPATIENT)
Dept: LAB | Facility: CLINIC | Age: 28
End: 2023-07-20
Payer: COMMERCIAL

## 2023-07-20 DIAGNOSIS — I82.4Y3 ACUTE DEEP VEIN THROMBOSIS (DVT) OF PROXIMAL END OF BOTH LOWER EXTREMITIES (H): ICD-10-CM

## 2023-07-20 DIAGNOSIS — Z83.2 FAMILY HISTORY OF CLOTTING DISORDER: ICD-10-CM

## 2023-07-20 DIAGNOSIS — Z79.01 LONG TERM CURRENT USE OF ANTICOAGULANTS WITH INR GOAL OF 2.0-3.0: ICD-10-CM

## 2023-07-20 DIAGNOSIS — R76.0 ANTIPHOSPHOLIPID ANTIBODY POSITIVE: ICD-10-CM

## 2023-07-20 LAB — INR PPP: 3.04 (ref 0.85–1.15)

## 2023-07-20 PROCEDURE — 36415 COLL VENOUS BLD VENIPUNCTURE: CPT

## 2023-07-20 PROCEDURE — 85610 PROTHROMBIN TIME: CPT

## 2023-07-21 ENCOUNTER — ANTICOAGULATION THERAPY VISIT (OUTPATIENT)
Dept: ANTICOAGULATION | Facility: CLINIC | Age: 28
End: 2023-07-21
Payer: COMMERCIAL

## 2023-07-21 DIAGNOSIS — R76.0 ANTIPHOSPHOLIPID ANTIBODY POSITIVE: Primary | ICD-10-CM

## 2023-07-21 DIAGNOSIS — Z79.01 LONG TERM CURRENT USE OF ANTICOAGULANTS WITH INR GOAL OF 2.0-3.0: ICD-10-CM

## 2023-07-21 DIAGNOSIS — I82.4Y3 ACUTE DEEP VEIN THROMBOSIS (DVT) OF PROXIMAL END OF BOTH LOWER EXTREMITIES (H): ICD-10-CM

## 2023-07-21 NOTE — PROGRESS NOTES
ANTICOAGULATION MANAGEMENT     Ronna Coleman 27 year old female is on warfarin with therapeutic INR result. (Goal INR 2.0-3.0)    Recent labs: (last 7 days)     07/20/23  1416   INR 3.04*       ASSESSMENT     Warfarin Lab Questionnaire    Warfarin Doses Last 7 Days      7/19/2023     4:15 PM   Dose in Tablet or Mg   TAB or MG? tablet (tab)     Pt Rptd Dose SUNDAY MONDAY TUESDAY WED THURS FRIDAY SATURDAY 7/19/2023   4:15 PM 1.5 1.5 2 1.5 2 1.5 2         7/19/2023   Warfarin Lab Questionnaire   Missed doses within past 14 days? Yes   If yes; please list when: July 13th   Changes in diet or alcohol within past 14 days? Yes   Please explain:  Had some drinks on July 15th   Medication changes since last result? No   Injuries or illness since last result? No   New shortness of breath, severe headaches or sudden changes in vision since last result? No   Abnormal bleeding since last result? No   Upcoming surgery, procedure? No   Best number to call with results? [phone]     Previous result: Therapeutic last visit; previously outside of goal range  Additional findings: Attempted to reach, no answer. Unable to leave a VM.        PLAN     Recommended plan for temporary change(s) affecting INR     Dosing Instructions: Continue your current warfarin dose with next INR in 2 weeks       Summary  As of 7/21/2023    Full warfarin instructions:  10 mg every Tue, Thu, Sat; 7.5 mg all other days   Next INR check:  8/18/2023             Sent [employer] message with dosing and follow up instructions    Contact [phone]  to schedule and with any changes, questions or concerns.     Education provided:     Please call back if any changes to your diet, medications or how you've been taking warfarin    Plan made per [facility] anticoagulation protocol    Kristina Salinas, RN  Anticoagulation Clinic  7/21/2023    _______________________________________________________________________     Anticoagulation Episode Summary     Current INR  goal:  2.0-3.0   TTR:  73.5 % (10 mo)   Target end date:  9/30/2023   Send INR reminders to:  [facility] [place] [place]    Indications    Antiphospholipid antibody positive [R76.0]  Long term current use of anticoagulants with INR goal of 2.0-3.0 [Z79.01]  Acute deep vein thrombosis (DVT) of proximal end of both lower extremities (H) [I82.4Y3]           Comments:           Anticoagulation Care Providers     Provider Role Specialty Phone number    Jose Tierney MD Referring Family Medicine [phone]

## 2023-08-10 ENCOUNTER — MYC MEDICAL ADVICE (OUTPATIENT)
Dept: ANTICOAGULATION | Facility: CLINIC | Age: 28
End: 2023-08-10
Payer: COMMERCIAL

## 2023-08-10 NOTE — TELEPHONE ENCOUNTER
ANTICOAGULATION     Ronna Coleman is overdue for INR check.       Mychart sent    Michelle Ye RN

## 2023-08-17 ENCOUNTER — TELEPHONE (OUTPATIENT)
Dept: ANTICOAGULATION | Facility: CLINIC | Age: 28
End: 2023-08-17
Payer: COMMERCIAL

## 2023-08-17 NOTE — TELEPHONE ENCOUNTER
ANTICOAGULATION     Ronna Coleman is overdue for INR check.      Left message for patient to call and schedule lab appointment as soon as possible. If returning call, please schedule.     Michelle Ye RN

## 2023-08-22 ENCOUNTER — DOCUMENTATION ONLY (OUTPATIENT)
Dept: ANTICOAGULATION | Facility: CLINIC | Age: 28
End: 2023-08-22
Payer: COMMERCIAL

## 2023-08-22 DIAGNOSIS — R76.0 ANTIPHOSPHOLIPID ANTIBODY POSITIVE: Primary | ICD-10-CM

## 2023-08-22 DIAGNOSIS — Z79.01 LONG TERM CURRENT USE OF ANTICOAGULANTS WITH INR GOAL OF 2.0-3.0: ICD-10-CM

## 2023-08-22 DIAGNOSIS — I82.491 ACUTE DEEP VEIN THROMBOSIS (DVT) OF OTHER SPECIFIED VEIN OF RIGHT LOWER EXTREMITY (H): ICD-10-CM

## 2023-08-22 DIAGNOSIS — I82.4Y3 ACUTE DEEP VEIN THROMBOSIS (DVT) OF PROXIMAL END OF BOTH LOWER EXTREMITIES (H): ICD-10-CM

## 2023-08-22 NOTE — PROGRESS NOTES
ANTICOAGULATION CLINIC REFERRAL RENEWAL REQUEST       An annual renewal order is required for all patients referred to [facility].?  Please review and sign the pended referral order for Ronna Coleman.       ANTICOAGULATION SUMMARY      Warfarin indication(s)   DVT and Antiphospholipid Antibodies    Mechanical heart valve present?  NO       Current goal range   INR: 2.0-3.0     Goal appropriate for indication? Goal INR 2-3, standard for indication(s) above     Time in Therapeutic Range (TTR)  (Goal > 60%) 73.5%       Office visit with referring provider's group within last year yes on 11/9/22       Michelle Ye RN  [facility]

## 2023-08-24 ENCOUNTER — DOCUMENTATION ONLY (OUTPATIENT)
Dept: ANTICOAGULATION | Facility: CLINIC | Age: 28
End: 2023-08-24
Payer: COMMERCIAL

## 2023-08-24 NOTE — LETTER
[facility]  [address] [facility] [address]  Phone: [phone]  Fax: [phone]   August 24, 2023        Ronna Coleman  [address] [place] [address]  [place] [address]            Dear Ronna,    You are currently under the care of [facility] for your warfarin (Coumadin , Jantoven ) therapy.  We are contacting you because our records show you were due for an INR on 8/3/2023.    There are potentially serious risks when taking warfarin without careful monitoring and we want to make sure you are safely managed.  Routine lab monitoring is required for warfarin refills.     Please call [phone]  as soon as possible to schedule an appointment.  If there has been a change in your care or other concerns, please let us know so we can help and or update our records.     Sincerely,       [facility]

## 2023-08-24 NOTE — PROGRESS NOTES
ANTICOAGULATION     Ronna Coleman is overdue for INR check.      Reminder letter sent    Michelle Ye RN

## 2023-09-07 ENCOUNTER — DOCUMENTATION ONLY (OUTPATIENT)
Dept: ANTICOAGULATION | Facility: CLINIC | Age: 28
End: 2023-09-07
Payer: COMMERCIAL

## 2023-09-07 NOTE — LETTER
[facility]  [address] [facility] [address]  Phone: [phone]  Fax: [phone]   September 7, 2023        Ronna Coleman  [address] [place] [address]  [facility] [address]            Dear Ronna,    You are currently under the care of [facility] for your warfarin (Coumadin , Jantoven ) therapy.  We are contacting you because our records show you were due for an INR on 8/18/2023.    There are potentially serious risks when taking warfarin without careful monitoring and we want to make sure you are safely managed.  Routine lab monitoring is required for warfarin refills.     Please call [phone]  as soon as possible to schedule an appointment.  If there has been a change in your care or other concerns, please let us know so we can help and or update our records.     Sincerely,       [facility]

## 2023-09-07 NOTE — PROGRESS NOTES
Anticoagulation clinic notificiation    Dr. Tierney,    Your patient, Ronna Coleman, is past due for an INR. Their last result was 3.04 on 7/20/2023 and was due to come back on 8/18/2023.    Ronna Coleman received phone calls and letters over the last several weeks in attempt to arrange a follow up appointment.  Ronna Coleman will be sent another letter today.     No action is required from you unless you have additional information or if you would like to reach out personally to Ronna N Jared.    Please don t hesitate to contact the Anticoagulation Management Program if you have any questions or concerns.     Sincerely,     [facility]

## 2023-09-21 ENCOUNTER — DOCUMENTATION ONLY (OUTPATIENT)
Dept: ANTICOAGULATION | Facility: CLINIC | Age: 28
End: 2023-09-21

## 2023-09-21 ENCOUNTER — ANTICOAGULATION THERAPY VISIT (OUTPATIENT)
Dept: ANTICOAGULATION | Facility: CLINIC | Age: 28
End: 2023-09-21

## 2023-09-21 ENCOUNTER — LAB (OUTPATIENT)
Dept: LAB | Facility: CLINIC | Age: 28
End: 2023-09-21
Payer: COMMERCIAL

## 2023-09-21 DIAGNOSIS — I82.491 ACUTE DEEP VEIN THROMBOSIS (DVT) OF OTHER SPECIFIED VEIN OF RIGHT LOWER EXTREMITY (H): ICD-10-CM

## 2023-09-21 DIAGNOSIS — I82.4Y3 ACUTE DEEP VEIN THROMBOSIS (DVT) OF PROXIMAL END OF BOTH LOWER EXTREMITIES (H): ICD-10-CM

## 2023-09-21 DIAGNOSIS — R76.0 ANTIPHOSPHOLIPID ANTIBODY POSITIVE: ICD-10-CM

## 2023-09-21 DIAGNOSIS — Z79.01 LONG TERM CURRENT USE OF ANTICOAGULANTS WITH INR GOAL OF 2.0-3.0: ICD-10-CM

## 2023-09-21 DIAGNOSIS — R76.0 ANTIPHOSPHOLIPID ANTIBODY POSITIVE: Primary | ICD-10-CM

## 2023-09-21 DIAGNOSIS — Z79.01 LONG TERM CURRENT USE OF ANTICOAGULANTS WITH INR GOAL OF 2.0-3.0: Primary | ICD-10-CM

## 2023-09-21 LAB
HOLD SPECIMEN: NORMAL
INR PPP: 2.93 (ref 0.85–1.15)

## 2023-09-21 PROCEDURE — 85610 PROTHROMBIN TIME: CPT

## 2023-09-21 PROCEDURE — 36415 COLL VENOUS BLD VENIPUNCTURE: CPT

## 2023-09-21 NOTE — PROGRESS NOTES
Updated standing order for INR per protocol.    Elizabeth Recinos RN    [facility]

## 2023-09-21 NOTE — PROGRESS NOTES
ANTICOAGULATION MANAGEMENT     Ronna Coleman 27 year old female is on warfarin with therapeutic INR result. (Goal INR 2.0-3.0)    Recent labs: (last 7 days)     09/21/23  1110   INR 2.93*       ASSESSMENT     Warfarin Lab Questionnaire    Warfarin Doses Last 7 Days      9/21/2023     9:49 AM   Dose in Tablet or Mg   TAB or MG? tablet (tab)     Pt Rptd Dose SUNDAY MONDAY TUESDAY WED THURS FRIDAY SATURDAY 9/21/2023   9:49 AM 1.5 1.5 2 1.5 2 1.5 2         9/21/2023   Warfarin Lab Questionnaire   Missed doses within past 14 days? No   Changes in diet or alcohol within past 14 days? No   Medication changes since last result? No   Injuries or illness since last result? No   New shortness of breath, severe headaches or sudden changes in vision since last result? No   Abnormal bleeding since last result? No   Upcoming surgery, procedure? No     Previous result: Supratherapeutic  Additional findings: None       PLAN     Recommended plan for no diet, medication or health factor changes affecting INR     Dosing Instructions: Continue your current warfarin dose with next INR in 4 weeks       Summary  As of 9/21/2023      Full warfarin instructions:  10 mg every Tue, Thu, Sat; 7.5 mg all other days   Next INR check:  10/19/2023               Telephone call with Ronna who verbalizes understanding and agrees to plan    Lab visit scheduled    Education provided:   Goal range and lab monitoring: goal range and significance of current result    Plan made per [facility] anticoagulation protocol    Staci Lujan RN  Anticoagulation Clinic  9/21/2023    _______________________________________________________________________     Anticoagulation Episode Summary       Current INR goal:  2.0-3.0   TTR:  71.8 % (1 y)   Target end date:  9/30/2023   Send INR reminders to:  [facility] [place]    Indications    Antiphospholipid antibody positive [R76.0]  Long term current use of anticoagulants with INR goal of 2.0-3.0 [Z79.01]  Acute deep vein  thrombosis (DVT) of proximal end of both lower extremities (H) [I82.4Y3]  Acute deep vein thrombosis (DVT) of other specified vein of right lower extremity (H) [I82.491]             Comments:               Anticoagulation Care Providers       Provider Role Specialty Phone number    Jose Tierney MD Referring Family Medicine [phone]    Wendy Jacob APRN CNP Referring Internal Medicine - Pediatrics [phone]

## 2023-10-03 DIAGNOSIS — I82.491 ACUTE DEEP VEIN THROMBOSIS (DVT) OF OTHER SPECIFIED VEIN OF RIGHT LOWER EXTREMITY (H): ICD-10-CM

## 2023-10-03 DIAGNOSIS — R76.0 ANTIPHOSPHOLIPID ANTIBODY POSITIVE: ICD-10-CM

## 2023-10-03 DIAGNOSIS — Z79.01 LONG TERM CURRENT USE OF ANTICOAGULANTS WITH INR GOAL OF 2.0-3.0: ICD-10-CM

## 2023-10-03 RX ORDER — WARFARIN SODIUM 5 MG/1
TABLET ORAL
Qty: 150 TABLET | Refills: 1 | Status: SHIPPED | OUTPATIENT
Start: 2023-10-03 | End: 2023-10-09

## 2023-10-03 NOTE — TELEPHONE ENCOUNTER
ANTICOAGULATION MANAGEMENT:  Medication Refill    Anticoagulation Summary  As of 9/21/2023      Warfarin maintenance plan:  10 mg (5 mg x 2) every Tue, Thu, Sat; 7.5 mg (5 mg x 1.5) all other days   Next INR check:  10/19/2023   Target end date:  9/30/2023    Indications    Antiphospholipid antibody positive [R76.0]  Long term current use of anticoagulants with INR goal of 2.0-3.0 [Z79.01]  Acute deep vein thrombosis (DVT) of proximal end of both lower extremities (H) [I82.4Y3]  Acute deep vein thrombosis (DVT) of other specified vein of right lower extremity (H) [I82.491]                 Anticoagulation Care Providers       Provider Role Specialty Phone number    Jose Tierney MD Referring Family Medicine [phone]    Wendy Jacob APRN CNP Referring Internal Medicine - Pediatrics [phone]            Refill Criteria    Visit with referring provider/group: Meets criteria: office visit within referring provider group in the last 1 year on 11/9/22    [facility] referral signed last signed: 08/22/2023; within last year: Yes    Lab monitoring not exceeding 2 weeks overdue: Yes    Ronna meets all criteria for refill. Rx instructions and quantity supplied updated to match patient's current dosing plan. Warfarin 90 day supply with 1 refill granted per [facility] protocol     Staci Lujan RN  Anticoagulation Clinic

## 2023-10-08 DIAGNOSIS — I82.491 ACUTE DEEP VEIN THROMBOSIS (DVT) OF OTHER SPECIFIED VEIN OF RIGHT LOWER EXTREMITY (H): ICD-10-CM

## 2023-10-08 DIAGNOSIS — R76.0 ANTIPHOSPHOLIPID ANTIBODY POSITIVE: ICD-10-CM

## 2023-10-08 DIAGNOSIS — Z79.01 LONG TERM CURRENT USE OF ANTICOAGULANTS WITH INR GOAL OF 2.0-3.0: Primary | ICD-10-CM

## 2023-10-09 RX ORDER — WARFARIN SODIUM 5 MG/1
TABLET ORAL
Qty: 150 TABLET | Refills: 1 | Status: SHIPPED | OUTPATIENT
Start: 2023-10-09 | End: 2024-03-07

## 2023-10-09 NOTE — TELEPHONE ENCOUNTER
ANTICOAGULATION MANAGEMENT:  Medication Refill    Anticoagulation Summary  As of 9/21/2023      Warfarin maintenance plan:  10 mg (5 mg x 2) every Tue, Thu, Sat; 7.5 mg (5 mg x 1.5) all other days   Next INR check:  10/19/2023   Target end date:  9/30/2023    Indications    Antiphospholipid antibody positive [R76.0]  Long term current use of anticoagulants with INR goal of 2.0-3.0 [Z79.01]  Acute deep vein thrombosis (DVT) of proximal end of both lower extremities (H) [I82.4Y3]  Acute deep vein thrombosis (DVT) of other specified vein of right lower extremity (H) [I82.491]                 Anticoagulation Care Providers       Provider Role Specialty Phone number    Jose Tierney MD Referring Family Medicine [phone]    Wenyd Jacob APRN CNP Referring Internal Medicine - Pediatrics [phone]            Refill Criteria    Visit with referring provider/group: Meets criteria: office visit within referring provider group in the last 1 year on 11/9/22    [facility] referral signed last signed: 08/22/2023; within last year: Yes    Lab monitoring not exceeding 2 weeks overdue: Yes    Ronna meets all criteria for refill. Rx instructions and quantity supplied updated to match patient's current dosing plan. Warfarin 90 day supply with 1 refill granted per [facility] protocol     Gabbie Armendariz RN  Anticoagulation Clinic

## 2023-10-19 ENCOUNTER — LAB (OUTPATIENT)
Dept: LAB | Facility: CLINIC | Age: 28
End: 2023-10-19
Payer: COMMERCIAL

## 2023-10-19 DIAGNOSIS — I82.491 ACUTE DEEP VEIN THROMBOSIS (DVT) OF OTHER SPECIFIED VEIN OF RIGHT LOWER EXTREMITY (H): ICD-10-CM

## 2023-10-19 DIAGNOSIS — R76.0 ANTIPHOSPHOLIPID ANTIBODY POSITIVE: ICD-10-CM

## 2023-10-19 DIAGNOSIS — I82.4Y3 ACUTE DEEP VEIN THROMBOSIS (DVT) OF PROXIMAL END OF BOTH LOWER EXTREMITIES (H): ICD-10-CM

## 2023-10-19 LAB — INR PPP: 3.32 (ref 0.85–1.15)

## 2023-10-19 PROCEDURE — 85610 PROTHROMBIN TIME: CPT

## 2023-10-19 PROCEDURE — 36415 COLL VENOUS BLD VENIPUNCTURE: CPT

## 2023-10-20 ENCOUNTER — ANTICOAGULATION THERAPY VISIT (OUTPATIENT)
Dept: ANTICOAGULATION | Facility: CLINIC | Age: 28
End: 2023-10-20
Payer: COMMERCIAL

## 2023-10-20 DIAGNOSIS — I82.4Y3 ACUTE DEEP VEIN THROMBOSIS (DVT) OF PROXIMAL END OF BOTH LOWER EXTREMITIES (H): ICD-10-CM

## 2023-10-20 DIAGNOSIS — R76.0 ANTIPHOSPHOLIPID ANTIBODY POSITIVE: Primary | ICD-10-CM

## 2023-10-20 DIAGNOSIS — I82.491 ACUTE DEEP VEIN THROMBOSIS (DVT) OF OTHER SPECIFIED VEIN OF RIGHT LOWER EXTREMITY (H): ICD-10-CM

## 2023-10-20 DIAGNOSIS — Z79.01 LONG TERM CURRENT USE OF ANTICOAGULANTS WITH INR GOAL OF 2.0-3.0: ICD-10-CM

## 2023-10-20 NOTE — PROGRESS NOTES
ANTICOAGULATION MANAGEMENT     Ronna JAIME Kwesimichael 27 year old female is on warfarin with supratherapeutic INR result. (Goal INR 2.0-3.0)    Recent labs: (last 7 days)     10/19/23  1524   INR 3.32*       ASSESSMENT     Warfarin Lab Questionnaire    Warfarin Doses Last 7 Days      10/18/2023     7:37 PM   Dose in Tablet or Mg   TAB or MG? tablet (tab)     Pt Rptd Dose ELROY MONDAY TUESDAY WED THURS FRIDAY SATURDAY   10/18/2023   7:37 PM 1.5 1.5 2 1.5 2 1.5 2         10/18/2023   Warfarin Lab Questionnaire   Missed doses within past 14 days? No   Changes in diet or alcohol within past 14 days? No   Medication changes since last result? No   Injuries or illness since last result? No   New shortness of breath, severe headaches or sudden changes in vision since last result? No   Abnormal bleeding since last result? No   Upcoming surgery, procedure? No     Previous result: Therapeutic last visit; previously outside of goal range  Additional findings: None       PLAN     Recommended plan for no diet, medication or health factor changes affecting INR     Dosing Instructions: decrease your warfarin dose (4.2% change) with next INR in 2 weeks       Summary  As of 10/20/2023      Full warfarin instructions:  10 mg every Tue, Sat; 7.5 mg all other days   Next INR check:  11/2/2023               Detailed voice message left for Ronna with dosing instructions and follow up date.   Sent [email or web address] message with dosing and follow up instructions    Contact [phone]  to schedule and with any changes, questions or concerns.     Education provided:   Please call back if any changes to your diet, medications or how you've been taking warfarin  Goal range and lab monitoring: goal range and significance of current result    Plan made per [facility] anticoagulation protocol    Gabbie Armendariz, HEBER  Anticoagulation Clinic  10/20/2023    _______________________________________________________________________     Anticoagulation Episode Summary        Current INR goal:  2.0-3.0   TTR:  65.6% (1 y)   Target end date:  9/30/2023   Send INR reminders to:  [facility] [place]    Indications    Antiphospholipid antibody positive [R76.0]  Long term current use of anticoagulants with INR goal of 2.0-3.0 [Z79.01]  Acute deep vein thrombosis (DVT) of proximal end of both lower extremities (H) [I82.4Y3]  Acute deep vein thrombosis (DVT) of other specified vein of right lower extremity (H) [I82.491]             Comments:               Anticoagulation Care Providers       Provider Role Specialty Phone number    Jose Tierney MD Referring Family Medicine [phone]    Wendy Jacob APRN CNP Referring Internal Medicine - Pediatrics [phone]

## 2023-11-09 ENCOUNTER — TELEPHONE (OUTPATIENT)
Dept: ANTICOAGULATION | Facility: CLINIC | Age: 28
End: 2023-11-09
Payer: COMMERCIAL

## 2023-11-09 NOTE — TELEPHONE ENCOUNTER
ANTICOAGULATION     Ronna Coleman is overdue for an INR check.     Left message for patient to call and schedule lab appointment as soon as possible. If returning call, please schedule.     Elizabeth Kimble RN

## 2023-11-21 ENCOUNTER — TELEPHONE (OUTPATIENT)
Dept: ANTICOAGULATION | Facility: CLINIC | Age: 28
End: 2023-11-21
Payer: COMMERCIAL

## 2023-11-21 NOTE — TELEPHONE ENCOUNTER
ANTICOAGULATION     Ronna Coleman is overdue for an INR check.     Spoke with patient and scheduled lab appointment on 11/22/23    Staci Lujan RN

## 2023-11-22 ENCOUNTER — LAB (OUTPATIENT)
Dept: LAB | Facility: CLINIC | Age: 28
End: 2023-11-22
Payer: COMMERCIAL

## 2023-11-22 DIAGNOSIS — I82.4Y3 ACUTE DEEP VEIN THROMBOSIS (DVT) OF PROXIMAL END OF BOTH LOWER EXTREMITIES (H): ICD-10-CM

## 2023-11-22 DIAGNOSIS — I82.491 ACUTE DEEP VEIN THROMBOSIS (DVT) OF OTHER SPECIFIED VEIN OF RIGHT LOWER EXTREMITY (H): ICD-10-CM

## 2023-11-22 DIAGNOSIS — R76.0 ANTIPHOSPHOLIPID ANTIBODY POSITIVE: ICD-10-CM

## 2023-11-22 LAB — INR PPP: 2.73 (ref 0.85–1.15)

## 2023-11-22 PROCEDURE — 36415 COLL VENOUS BLD VENIPUNCTURE: CPT

## 2023-11-22 PROCEDURE — 85610 PROTHROMBIN TIME: CPT

## 2023-11-24 ENCOUNTER — ANTICOAGULATION THERAPY VISIT (OUTPATIENT)
Dept: ANTICOAGULATION | Facility: CLINIC | Age: 28
End: 2023-11-24
Payer: COMMERCIAL

## 2023-11-24 DIAGNOSIS — Z79.01 LONG TERM CURRENT USE OF ANTICOAGULANTS WITH INR GOAL OF 2.0-3.0: ICD-10-CM

## 2023-11-24 DIAGNOSIS — I82.491 ACUTE DEEP VEIN THROMBOSIS (DVT) OF OTHER SPECIFIED VEIN OF RIGHT LOWER EXTREMITY (H): ICD-10-CM

## 2023-11-24 DIAGNOSIS — I82.4Y3 ACUTE DEEP VEIN THROMBOSIS (DVT) OF PROXIMAL END OF BOTH LOWER EXTREMITIES (H): ICD-10-CM

## 2023-11-24 DIAGNOSIS — R76.0 ANTIPHOSPHOLIPID ANTIBODY POSITIVE: Primary | ICD-10-CM

## 2023-11-24 NOTE — PROGRESS NOTES
ANTICOAGULATION MANAGEMENT     Ronna Coleman 27 year old female is on warfarin with therapeutic INR result. (Goal INR 2.0-3.0)    Recent labs: (last 7 days)     11/22/23  1420   INR 2.73*       ASSESSMENT     Source(s): Chart Reviewed    Warfarin doses taken: Weekly warfarin dose decreased on 10/20/23.  Medication/supplement changes: None noted  New illness, injury, or hospitalization: None noted.  Previous result: Supratherapeutic at 3.32 on 10/19/23.  Additional findings:  will also send [employer] message with dosing.       PLAN     Recommended plan for no diet, medication or health factor changes affecting INR     Dosing Instructions: Continue your current warfarin dose with next INR in 2 weeks       Summary  As of 11/24/2023      Full warfarin instructions:  10 mg every Tue, Sat; 7.5 mg all other days   Next INR check:  12/6/2023               Detailed voice message left for Ronna with dosing instructions and follow up date.     Contact [phone] to schedule and with any changes, questions or concerns.     Education provided:   Please call back if any changes to your diet, medications or how you've been taking warfarin  Taking warfarin: Importance of taking warfarin as instructed  Goal range and lab monitoring: goal range and significance of current result  Dietary considerations: importance of consistent vitamin K intake    Plan made per [facility] anticoagulation protocol    Crystal Ortiz RN  Anticoagulation Clinic  11/24/2023    _______________________________________________________________________     Anticoagulation Episode Summary       Current INR goal:  2.0-3.0   TTR:  60.5% (1 y)   Target end date:  9/30/2023   Send INR reminders to:  [facility] [place]    Indications    Antiphospholipid antibody positive [R76.0]  Long term current use of anticoagulants with INR goal of 2.0-3.0 [Z79.01]  Acute deep vein thrombosis (DVT) of proximal end of both lower extremities (H) [I82.4Y3]  Acute deep vein  thrombosis (DVT) of other specified vein of right lower extremity (H) [I82.491]             Comments:               Anticoagulation Care Providers       Provider Role Specialty Phone number    Jose Tierney MD Referring Family Medicine [phone]    Wendy Jacob APRN CNP Referring Internal Medicine - Pediatrics [phone]

## 2023-12-14 ENCOUNTER — TELEPHONE (OUTPATIENT)
Dept: ANTICOAGULATION | Facility: CLINIC | Age: 28
End: 2023-12-14
Payer: COMMERCIAL

## 2023-12-14 NOTE — TELEPHONE ENCOUNTER
ANTICOAGULATION     Ronna Coleman is overdue for an INR check.     Left message for patient to call and schedule lab appointment as soon as possible. If returning call, please schedule.     Staci Lujan RN

## 2023-12-21 ENCOUNTER — TELEPHONE (OUTPATIENT)
Dept: ANTICOAGULATION | Facility: CLINIC | Age: 28
End: 2023-12-21
Payer: COMMERCIAL

## 2023-12-28 ENCOUNTER — DOCUMENTATION ONLY (OUTPATIENT)
Dept: ANTICOAGULATION | Facility: CLINIC | Age: 28
End: 2023-12-28
Payer: COMMERCIAL

## 2023-12-28 NOTE — PROGRESS NOTES
ANTICOAGULATION     Ronna Coleman is overdue for an INR check.     Reminder letter sent    Michelle Ye RN

## 2023-12-28 NOTE — LETTER
[facility] ANTICOAGULATION CLINIC  [address] [facility] [address]  Phone: [phone]  Fax: [phone]   December 28, 2023        Ronna Coleman  [address] [place] [address]  [place] [address]            Dear Ronna,    You are currently under the care of [facility] for your warfarin (Coumadin , Jantoven ) therapy.  We are contacting you because our records show you were due for an INR on 12/6/2023.    There are potentially serious risks when taking warfarin without careful monitoring and we want to make sure you are safely managed.  Routine lab monitoring is required for warfarin refills.     Please call [phone]  as soon as possible to schedule a lab appointment. If it is difficult for you to get to lab, please call us to discuss options.  If there has been a change in your care or other concerns, please let us know so we can help and/or update our records.         Sincerely,       [facility]

## 2024-01-12 ENCOUNTER — DOCUMENTATION ONLY (OUTPATIENT)
Dept: ANTICOAGULATION | Facility: CLINIC | Age: 29
End: 2024-01-12

## 2024-01-12 NOTE — PROGRESS NOTES
I see that patient received [employer] messages, telephone calls and letter regarding this  We will wait to hear from her

## 2024-01-12 NOTE — LETTER
[facility] ANTICOAGULATION CLINIC  [address] [facility] [address]  Phone: [phone]  Fax: [phone]   January 12, 2024        Ronna Coleman  [address] [place] [address]  [facility] [address]            Dear Ronna,    You are currently under the care of [facility] for your warfarin (Coumadin , Jantoven ) therapy.  We are contacting you because our records show you were due for an INR on 12-6-2023.    There are potentially serious risks when taking warfarin without careful monitoring and we want to make sure you are safely managed.  Routine lab monitoring is required for warfarin refills.     Please call [phone]  as soon as possible to schedule a lab appointment. If it is difficult for you to get to lab, please call us to discuss options.  If there has been a change in your care or other concerns, please let us know so we can help and/or update our records.         Sincerely,       [facility]

## 2024-01-12 NOTE — PROGRESS NOTES
Anticoagulation Clinic Notification    Ronna, is past due for an INR. Their last result was 2.73 on 11- and was due to come back on 12-6-2023.    she received phone calls and letters over the last several weeks in attempt to arrange follow up labs. Ronna YENI Coleman will be contacted again today.     Please contact patient directly to discuss compliance with monitoring or schedule visit to review ongoing anticoagulation therapy.    Thank you,     [facility] Anticoagulation Clinic

## 2024-02-11 ENCOUNTER — HEALTH MAINTENANCE LETTER (OUTPATIENT)
Age: 29
End: 2024-02-11

## 2024-02-15 ENCOUNTER — TELEPHONE (OUTPATIENT)
Dept: ANTICOAGULATION | Facility: CLINIC | Age: 29
End: 2024-02-15
Payer: COMMERCIAL

## 2024-02-15 NOTE — TELEPHONE ENCOUNTER
ANTICOAGULATION MANAGEMENT PROGRAM    Dr. Tierney,     Our records indicate that Ronna Coleman remains past due to check an INR. Ronna Coleman was contacted multiple times over at least the last 8 weeks to attempt to arrange a follow up appointment.    Ronna Coleman last had an an INR checked on 11/24/2023 and was due for follow up on 12/6/2023     Called patient,Left message for patient to call and schedule lab appointment as soon as possible. If returning call, please schedule.      At this time Ronna Coleman will be moved to noncompliant status within the program until their referral expires on 8/22/2024. While in noncompliant status the patient would continue to be contacted every 6 weeks by the anticoagulation program to attempt to schedule patient. You will be notified of each contact attempt to make you aware of patient's ongoing noncompliance.        Thank you,     [facility] Anticoagulation Management Program

## 2024-03-07 ENCOUNTER — LAB (OUTPATIENT)
Dept: LAB | Facility: CLINIC | Age: 29
End: 2024-03-07
Payer: COMMERCIAL

## 2024-03-07 ENCOUNTER — ANTICOAGULATION THERAPY VISIT (OUTPATIENT)
Dept: ANTICOAGULATION | Facility: CLINIC | Age: 29
End: 2024-03-07

## 2024-03-07 DIAGNOSIS — R76.0 ANTIPHOSPHOLIPID ANTIBODY POSITIVE: ICD-10-CM

## 2024-03-07 DIAGNOSIS — Z79.01 LONG TERM CURRENT USE OF ANTICOAGULANTS WITH INR GOAL OF 2.0-3.0: ICD-10-CM

## 2024-03-07 DIAGNOSIS — R76.0 ANTIPHOSPHOLIPID ANTIBODY POSITIVE: Primary | ICD-10-CM

## 2024-03-07 DIAGNOSIS — I82.491 ACUTE DEEP VEIN THROMBOSIS (DVT) OF OTHER SPECIFIED VEIN OF RIGHT LOWER EXTREMITY (H): ICD-10-CM

## 2024-03-07 DIAGNOSIS — I82.4Y3 ACUTE DEEP VEIN THROMBOSIS (DVT) OF PROXIMAL END OF BOTH LOWER EXTREMITIES (H): ICD-10-CM

## 2024-03-07 LAB — INR PPP: 4.18 (ref 0.85–1.15)

## 2024-03-07 PROCEDURE — 85610 PROTHROMBIN TIME: CPT

## 2024-03-07 PROCEDURE — 36415 COLL VENOUS BLD VENIPUNCTURE: CPT

## 2024-03-07 RX ORDER — WARFARIN SODIUM 5 MG/1
TABLET ORAL
Qty: 46 TABLET | Refills: 0 | Status: SHIPPED | OUTPATIENT
Start: 2024-03-07 | End: 2024-05-15

## 2024-03-07 NOTE — TELEPHONE ENCOUNTER
ANTICOAGULATION MANAGEMENT:  Medication Refill    Anticoagulation Summary  As of 11/24/2023      Warfarin maintenance plan:  10 mg (5 mg x 2) every Tue, Sat; 7.5 mg (5 mg x 1.5) all other days   Next INR check:  12/6/2023   Target end date:  9/30/2023    Indications    Antiphospholipid antibody positive [R76.0]  Long term current use of anticoagulants with INR goal of 2.0-3.0 [Z79.01]  Acute deep vein thrombosis (DVT) of proximal end of both lower extremities (H) [I82.4Y3]  Acute deep vein thrombosis (DVT) of other specified vein of right lower extremity (H) [I82.491]                 Anticoagulation Care Providers       Provider Role Specialty Phone number    Jose Tierney MD Referring Family Medicine [phone]    Wendy Jacob APRN CNP Referring Internal Medicine - Pediatrics [phone]            Refill Criteria    Visit with referring provider/group: Overdue for referring provider visit, last visit on 11/9/22    [facility] referral last signed: 08/22/2023; within last year: Yes    Lab monitoring not exceeding 2 weeks overdue: No    Ronna does NOT meet all criteria for refill: > 2 weeks overdue for lab monitoring . 30 day paul fill approved; patient notified to schedule labs per [facility] protocol    Staci Lujan RN  Anticoagulation Clinic

## 2024-03-08 NOTE — PROGRESS NOTES
ANTICOAGULATION MANAGEMENT     Ronna Coleman 28 year old female is on warfarin with supratherapeutic INR result. (Goal INR 2.0-3.0)    Recent labs: (last 7 days)     03/07/24  1506   INR 4.18*       ASSESSMENT     Source(s): Chart Review  Previous INR was Therapeutic last visit; previously outside of goal range  Medication, diet, health changes since last INR chart reviewed; none identified - pt was overdue for her INR. Attempted to call x 3 and [employer] message sent for assessment.          PLAN     Unable to reach Ronna today.    Left message to hold warfarin tonight. Request call back for assessment.    Follow up required to confirm warfarin dose taken and assess for changes and discuss out of range result     Jl Briseno RN  Anticoagulation Clinic  3/11/2024

## 2024-03-11 NOTE — PROGRESS NOTES
ANTICOAGULATION MANAGEMENT     Ronna Coleman 28 year old female is on warfarin with supratherapeutic INR result. (Goal INR 2.0-3.0)    Recent labs: (last 7 days)     03/07/24  1506   INR 4.18*       ASSESSMENT     Source(s): Chart Review and Patient/Caregiver Call     Warfarin doses taken: More warfarin taken than planned which may be affecting INR- patient was still taking previous maintenance dose of 60 mg weekly  Diet: Change in alcohol intake may be affecting INR. Patient had gone out drinking on the weekend so more alcohol than previously taken  Medication/supplement changes: None noted  New illness, injury, or hospitalization: No  Signs or symptoms of bleeding or clotting: No  Previous result: Therapeutic last visit; previously outside of goal range  Additional findings: None       PLAN     Recommended plan for temporary change(s) affecting INR     Dosing Instructions: hold dose then take intended maintenance dose of 10 mg every Tue, Sat; 7.5 mg all other days with next INR in 1 week       Summary  As of 3/7/2024      Full warfarin instructions:  3/11: Hold; Otherwise 10 mg every Tue, Sat; 7.5 mg all other days   Next INR check:  3/18/2024               Telephone call with Ronna who verbalizes understanding and agrees to plan and who agrees to plan and repeated back plan correctly  Sent [employer] message with dosing and follow up instructions    Lab visit scheduled    Education provided:   Taking warfarin: importance of following [facility] instructions vs instructions on the prescription bottle and Importance of taking warfarin as instructed  Goal range and lab monitoring: goal range and significance of current result and Importance of following up at instructed interval  Dietary considerations: importance of notifying [facility] to changes in diet  Symptom monitoring: monitoring for bleeding signs and symptoms and when to seek medical attention/emergency care  Importance of notifying anticoagulation clinic for: changes  in medications; a sooner lab recheck maybe needed and diarrhea, nausea/vomiting, reduced intake, cold/flu, and/or infections; a sooner lab recheck maybe needed    Plan made per [facility] anticoagulation protocol    Michelle Ye RN  Anticoagulation Clinic  3/11/2024    _______________________________________________________________________     Anticoagulation Episode Summary       Current INR goal:  2.0-3.0   TTR:  54.8% (8.5 mo)   Target end date:  9/30/2023   Send INR reminders to:  [facility] [place]    Indications    Antiphospholipid antibody positive [R76.0]  Long term current use of anticoagulants with INR goal of 2.0-3.0 [Z79.01]  Acute deep vein thrombosis (DVT) of proximal end of both lower extremities (H) [I82.4Y3]  Acute deep vein thrombosis (DVT) of other specified vein of right lower extremity (H) [I82.491]             Comments:               Anticoagulation Care Providers       Provider Role Specialty Phone number    Jose Tierney MD Referring Family Medicine [phone]    Wendy Jacob APRN CNP Referring Internal Medicine - Pediatrics [phone]

## 2024-03-18 ENCOUNTER — LAB (OUTPATIENT)
Dept: LAB | Facility: CLINIC | Age: 29
End: 2024-03-18
Payer: COMMERCIAL

## 2024-03-18 DIAGNOSIS — I82.4Y3 ACUTE DEEP VEIN THROMBOSIS (DVT) OF PROXIMAL END OF BOTH LOWER EXTREMITIES (H): ICD-10-CM

## 2024-03-18 DIAGNOSIS — I82.491 ACUTE DEEP VEIN THROMBOSIS (DVT) OF OTHER SPECIFIED VEIN OF RIGHT LOWER EXTREMITY (H): ICD-10-CM

## 2024-03-18 DIAGNOSIS — R76.0 ANTIPHOSPHOLIPID ANTIBODY POSITIVE: ICD-10-CM

## 2024-03-18 LAB — INR PPP: 3.13 (ref 0.85–1.15)

## 2024-03-18 PROCEDURE — 85610 PROTHROMBIN TIME: CPT

## 2024-03-18 PROCEDURE — 36415 COLL VENOUS BLD VENIPUNCTURE: CPT

## 2024-03-19 ENCOUNTER — ANTICOAGULATION THERAPY VISIT (OUTPATIENT)
Dept: ANTICOAGULATION | Facility: CLINIC | Age: 29
End: 2024-03-19
Payer: COMMERCIAL

## 2024-03-19 DIAGNOSIS — I82.491 ACUTE DEEP VEIN THROMBOSIS (DVT) OF OTHER SPECIFIED VEIN OF RIGHT LOWER EXTREMITY (H): ICD-10-CM

## 2024-03-19 DIAGNOSIS — Z79.01 LONG TERM CURRENT USE OF ANTICOAGULANTS WITH INR GOAL OF 2.0-3.0: ICD-10-CM

## 2024-03-19 DIAGNOSIS — R76.0 ANTIPHOSPHOLIPID ANTIBODY POSITIVE: Primary | ICD-10-CM

## 2024-03-19 DIAGNOSIS — I82.4Y3 ACUTE DEEP VEIN THROMBOSIS (DVT) OF PROXIMAL END OF BOTH LOWER EXTREMITIES (H): ICD-10-CM

## 2024-03-19 NOTE — PROGRESS NOTES
ANTICOAGULATION MANAGEMENT     Ronna Coleman 28 year old female is on warfarin with supratherapeutic INR result. (Goal INR 2.0-3.0)    Recent labs: (last 7 days)     03/18/24  1644   INR 3.13*       ASSESSMENT     Source(s): Chart Review and Patient/Caregiver Call     Warfarin doses taken: Warfarin taken as instructed  Diet: No new diet changes identified  Medication/supplement changes: None noted  New illness, injury, or hospitalization: No  Signs or symptoms of bleeding or clotting: No  Previous result: Supratherapeutic  Additional findings: None       PLAN     Recommended plan for no diet, medication or health factor changes affecting INR     Dosing Instructions: decrease your warfarin dose (4.3% change) with next INR in 2 weeks       Summary  As of 3/19/2024      Full warfarin instructions:  10 mg every Sat; 7.5 mg all other days   Next INR check:  4/1/2024               Telephone call with Ronna who verbalizes understanding and agrees to plan    Lab visit scheduled    Education provided:   Contact [phone]  with any changes, questions or concerns.     Plan made per [facility] anticoagulation protocol    Danuta Castillo RN  Anticoagulation Clinic  3/19/2024    _______________________________________________________________________     Anticoagulation Episode Summary       Current INR goal:  2.0-3.0   TTR:  50.8% (8.6 mo)   Target end date:  9/30/2023   Send INR reminders to:  [facility] [place]    Indications    Antiphospholipid antibody positive [R76.0]  Long term current use of anticoagulants with INR goal of 2.0-3.0 [Z79.01]  Acute deep vein thrombosis (DVT) of proximal end of both lower extremities (H) [I82.4Y3]  Acute deep vein thrombosis (DVT) of other specified vein of right lower extremity (H) [I82.491]             Comments:               Anticoagulation Care Providers       Provider Role Specialty Phone number    Jose Tierney MD Referring Family Medicine [phone]    Wendy Jacob,  APRN CNP Referring Internal Medicine - Pediatrics [phone]

## 2024-04-01 ENCOUNTER — LAB (OUTPATIENT)
Dept: LAB | Facility: CLINIC | Age: 29
End: 2024-04-01
Payer: COMMERCIAL

## 2024-04-01 DIAGNOSIS — I82.4Y3 ACUTE DEEP VEIN THROMBOSIS (DVT) OF PROXIMAL END OF BOTH LOWER EXTREMITIES (H): ICD-10-CM

## 2024-04-01 DIAGNOSIS — I82.491 ACUTE DEEP VEIN THROMBOSIS (DVT) OF OTHER SPECIFIED VEIN OF RIGHT LOWER EXTREMITY (H): ICD-10-CM

## 2024-04-01 DIAGNOSIS — R76.0 ANTIPHOSPHOLIPID ANTIBODY POSITIVE: ICD-10-CM

## 2024-04-01 LAB — INR PPP: 2.15 (ref 0.85–1.15)

## 2024-04-01 PROCEDURE — 36415 COLL VENOUS BLD VENIPUNCTURE: CPT

## 2024-04-01 PROCEDURE — 85610 PROTHROMBIN TIME: CPT

## 2024-04-02 ENCOUNTER — DOCUMENTATION ONLY (OUTPATIENT)
Dept: ANTICOAGULATION | Facility: CLINIC | Age: 29
End: 2024-04-02
Payer: COMMERCIAL

## 2024-04-02 ENCOUNTER — ANTICOAGULATION THERAPY VISIT (OUTPATIENT)
Dept: ANTICOAGULATION | Facility: CLINIC | Age: 29
End: 2024-04-02
Payer: COMMERCIAL

## 2024-04-02 DIAGNOSIS — I82.4Y3 ACUTE DEEP VEIN THROMBOSIS (DVT) OF PROXIMAL END OF BOTH LOWER EXTREMITIES (H): ICD-10-CM

## 2024-04-02 DIAGNOSIS — I82.491 ACUTE DEEP VEIN THROMBOSIS (DVT) OF OTHER SPECIFIED VEIN OF RIGHT LOWER EXTREMITY (H): ICD-10-CM

## 2024-04-02 DIAGNOSIS — Z79.01 LONG TERM CURRENT USE OF ANTICOAGULANTS WITH INR GOAL OF 2.0-3.0: ICD-10-CM

## 2024-04-02 DIAGNOSIS — R76.0 ANTIPHOSPHOLIPID ANTIBODY POSITIVE: Primary | ICD-10-CM

## 2024-04-02 NOTE — PROGRESS NOTES
ANTICOAGULATION MANAGEMENT     Ronna Coleman 28 year old female is on warfarin with therapeutic INR result. (Goal INR 2.0-3.0)    Recent labs: (last 7 days)     24  1327   INR 2.15*       ASSESSMENT     Warfarin Lab Questionnaire    Warfarin Doses Last 7 Days      3/31/2024     7:00 PM   Dose in Tablet or Mg   TAB or MG? tablet (tab)     Pt Rptd Dose ELROY MONDAY TUESDAY WED THURS FRIDAY SATURDAY   3/31/2024   7:00 PM 1.5 1.5 1.5 1.5 1.5 1.5 2         3/31/2024   Warfarin Lab Questionnaire   Missed doses within past 14 days? No   Changes in diet or alcohol within past 14 days? No   Medication changes since last result? No   Injuries or illness since last result? No   New shortness of breath, severe headaches or sudden changes in vision since last result? No   Abnormal bleeding since last result? No   Upcoming surgery, procedure? No     Previous result: Supratherapeutic  Additional findings: referral is . Sent to PCP to review and new indefinite referral signed today. Also, pt did read [employer] sent by writer.       PLAN     Recommended plan for no diet, medication or health factor changes affecting INR     Dosing Instructions: Continue your current warfarin dose with next INR in 3 weeks       Summary  As of 2024      Full warfarin instructions:  10 mg every Sat; 7.5 mg all other days   Next INR check:  2024               Sent [employer] message with dosing and follow up instructions    Contact [phone]  to schedule and with any changes, questions or concerns.     Education provided:   Please call back if any changes to your diet, medications or how you've been taking warfarin  Contact [phone]  with any changes, questions or concerns.     Plan made per [facility] anticoagulation protocol    Elizabeth Kimble, RN  Anticoagulation Clinic  2024    _______________________________________________________________________     Anticoagulation Episode Summary       Current INR goal:  2.0-3.0   TTR:   50.3% (8.6 mo)   Target end date:  Indefinite   Send INR reminders to:  [facility]    Indications    Antiphospholipid antibody positive [R76.0]  Long term current use of anticoagulants with INR goal of 2.0-3.0 [Z79.01]  Acute deep vein thrombosis (DVT) of proximal end of both lower extremities (H) [I82.4Y3]  Acute deep vein thrombosis (DVT) of other specified vein of right lower extremity (H) [I82.491]             Comments:               Anticoagulation Care Providers       Provider Role Specialty Phone number    Jose Tierney MD Referring Family Medicine [phone]    Wendy Jacob APRN CNP Referring Internal Medicine - Pediatrics [phone]

## 2024-04-02 NOTE — PROGRESS NOTES
It looks like this patient's anticoagulation referral  2023. The referral on 23 was signed thru 23 only. Can you review and sign a new one if she is supposed to be a lifetime referral? Updated referral pended.    Elizabeth Kimble, RN, BSN, PHN  Anticoagulation Clinic

## 2024-04-29 DIAGNOSIS — Z79.01 LONG TERM CURRENT USE OF ANTICOAGULANTS WITH INR GOAL OF 2.0-3.0: ICD-10-CM

## 2024-04-29 DIAGNOSIS — R76.0 ANTIPHOSPHOLIPID ANTIBODY POSITIVE: ICD-10-CM

## 2024-04-29 DIAGNOSIS — I82.491 ACUTE DEEP VEIN THROMBOSIS (DVT) OF OTHER SPECIFIED VEIN OF RIGHT LOWER EXTREMITY (H): ICD-10-CM

## 2024-04-29 NOTE — TELEPHONE ENCOUNTER
ANTICOAGULATION MANAGEMENT:  Medication Refill    Anticoagulation Summary  As of 4/2/2024      Warfarin maintenance plan:  10 mg (5 mg x 2) every Sat; 7.5 mg (5 mg x 1.5) all other days   Next INR check:  4/23/2024   Target end date:  Indefinite    Indications    Antiphospholipid antibody positive [R76.0]  Long term current use of anticoagulants with INR goal of 2.0-3.0 [Z79.01]  Acute deep vein thrombosis (DVT) of proximal end of both lower extremities (H) [I82.4Y3]  Acute deep vein thrombosis (DVT) of other specified vein of right lower extremity (H) [I82.491]                 Anticoagulation Care Providers       Provider Role Specialty Phone number    Jose Tierney MD Referring Family Medicine [phone]    Wendy Jacob APRN CNP Referring Internal Medicine - Pediatrics [phone]            Refill Criteria    Visit with referring provider/group: Overdue for referring provider visit, last visit on 12/9/22    [facility] referral last signed: 04/02/2024; within last year: Yes    Lab monitoring not exceeding 2 weeks overdue: No    Ronna does NOT meet all criteria for refill: Visit with referring provider's group was >= 1 year ago. paul fill previously given on 3/7/24; routing to referring provider for further refills  per [facility] protocol    Na Brito RN  Anticoagulation Clinic

## 2024-04-30 RX ORDER — WARFARIN SODIUM 5 MG/1
TABLET ORAL
Qty: 138 TABLET | Refills: 1 | OUTPATIENT
Start: 2024-04-30

## 2024-05-08 ENCOUNTER — DOCUMENTATION ONLY (OUTPATIENT)
Dept: ANTICOAGULATION | Facility: CLINIC | Age: 29
End: 2024-05-08
Payer: COMMERCIAL

## 2024-05-08 NOTE — LETTER
[facility] ANTICOAGULATION CLINIC  [address] [facility] [address]  Phone: [phone]  Fax: [phone]     May 8, 2024        Ronna Coleman  [address] [place] [address]  [place] [facility] [address]            Dear Ronna,    You are currently under the care of [facility] for your warfarin (Coumadin , Jantoven ) therapy.  We are contacting you because our records show you were due for an INR on 4/23/24.    There are potentially serious risks when taking warfarin without careful monitoring and we want to make sure you are safely managed.  Routine lab monitoring is required for warfarin refills.     Please call [phone]  as soon as possible to schedule a lab appointment. If it is difficult for you to get to lab, please call us to discuss options.  If there has been a change in your care or other concerns, please let us know so we can help and/or update our records.         Sincerely,       [facility]

## 2024-05-08 NOTE — PROGRESS NOTES
ANTICOAGULATION     Ronna Coleman is overdue for an INR check.     Reminder letter sent    Janet Blount RN, BSN, PHN  Anticoagulation Clinic   [phone]

## 2024-05-21 DIAGNOSIS — Z79.01 LONG TERM CURRENT USE OF ANTICOAGULANTS WITH INR GOAL OF 2.0-3.0: ICD-10-CM

## 2024-05-21 DIAGNOSIS — I82.491 ACUTE DEEP VEIN THROMBOSIS (DVT) OF OTHER SPECIFIED VEIN OF RIGHT LOWER EXTREMITY (H): ICD-10-CM

## 2024-05-21 DIAGNOSIS — R76.0 ANTIPHOSPHOLIPID ANTIBODY POSITIVE: ICD-10-CM

## 2024-05-22 ENCOUNTER — LAB (OUTPATIENT)
Dept: LAB | Facility: CLINIC | Age: 29
End: 2024-05-22
Payer: COMMERCIAL

## 2024-05-22 DIAGNOSIS — I82.4Y3 ACUTE DEEP VEIN THROMBOSIS (DVT) OF PROXIMAL END OF BOTH LOWER EXTREMITIES (H): ICD-10-CM

## 2024-05-22 DIAGNOSIS — Z79.01 LONG TERM CURRENT USE OF ANTICOAGULANTS WITH INR GOAL OF 2.0-3.0: ICD-10-CM

## 2024-05-22 DIAGNOSIS — R76.0 ANTIPHOSPHOLIPID ANTIBODY POSITIVE: ICD-10-CM

## 2024-05-22 DIAGNOSIS — I82.491 ACUTE DEEP VEIN THROMBOSIS (DVT) OF OTHER SPECIFIED VEIN OF RIGHT LOWER EXTREMITY (H): ICD-10-CM

## 2024-05-22 LAB — INR PPP: 1.6 (ref 0.85–1.15)

## 2024-05-22 PROCEDURE — 36415 COLL VENOUS BLD VENIPUNCTURE: CPT

## 2024-05-22 PROCEDURE — 85610 PROTHROMBIN TIME: CPT

## 2024-05-22 RX ORDER — WARFARIN SODIUM 5 MG/1
TABLET ORAL
Qty: 46 TABLET | Refills: 1 | OUTPATIENT
Start: 2024-05-22

## 2024-05-23 ENCOUNTER — ANTICOAGULATION THERAPY VISIT (OUTPATIENT)
Dept: ANTICOAGULATION | Facility: CLINIC | Age: 29
End: 2024-05-23
Payer: COMMERCIAL

## 2024-05-23 DIAGNOSIS — I82.491 ACUTE DEEP VEIN THROMBOSIS (DVT) OF OTHER SPECIFIED VEIN OF RIGHT LOWER EXTREMITY (H): ICD-10-CM

## 2024-05-23 DIAGNOSIS — I82.4Y3 ACUTE DEEP VEIN THROMBOSIS (DVT) OF PROXIMAL END OF BOTH LOWER EXTREMITIES (H): ICD-10-CM

## 2024-05-23 DIAGNOSIS — R76.0 ANTIPHOSPHOLIPID ANTIBODY POSITIVE: Primary | ICD-10-CM

## 2024-05-23 DIAGNOSIS — Z79.01 LONG TERM CURRENT USE OF ANTICOAGULANTS WITH INR GOAL OF 2.0-3.0: ICD-10-CM

## 2024-05-23 NOTE — PROGRESS NOTES
ANTICOAGULATION MANAGEMENT     Ronna Coleman 28 year old female is on warfarin with subtherapeutic INR result. (Goal INR 2.0-3.0)    Recent labs: (last 7 days)     05/22/24  1314   INR 1.60*       ASSESSMENT     Warfarin Lab Questionnaire    Warfarin Doses Last 7 Days      5/22/2024    12:58 PM   Dose in Tablet or Mg   TAB or MG? tablet (tab)     Pt Rptd Dose SUNDAY MONDAY TUESDAY WED THURS FRIDAY SATURDAY 5/22/2024  12:58 PM 1.5 1.5 1.5 1.5 1.5 1.5 2         5/22/2024   Warfarin Lab Questionnaire   Missed doses within past 14 days? Yes   If yes; please list when: I was almost out and my refill didnt come soon enough so i only had .5 tablets the last couple days   Changes in diet or alcohol within past 14 days? No   Medication changes since last result? No   Injuries or illness since last result? No   New shortness of breath, severe headaches or sudden changes in vision since last result? No   Abnormal bleeding since last result? No   Upcoming surgery, procedure? No     Previous result: Therapeutic last visit; previously outside of goal range  Additional findings: None       PLAN     Recommended plan for temporary change(s) affecting INR     Dosing Instructions: booster dose then continue your current warfarin dose with next INR in 2 weeks       Summary  As of 5/23/2024      Full warfarin instructions:  5/23: 10 mg; Otherwise 10 mg every Sat; 7.5 mg all other days   Next INR check:  6/6/2024               Sent [employer] message with dosing and follow up instructions    Contact [phone]  to schedule and with any changes, questions or concerns.     Education provided:   Please call back if any changes to your diet, medications or how you've been taking warfarin    Plan made per [facility] anticoagulation protocol    Michelle Ye, RN  Anticoagulation Clinic  5/23/2024    _______________________________________________________________________     Anticoagulation Episode Summary       Current INR goal:  2.0-3.0    TTR:  52.8% (8.6 mo)   Target end date:  Indefinite   Send INR reminders to:  [facility]    Indications    Antiphospholipid antibody positive [R76.0]  Long term current use of anticoagulants with INR goal of 2.0-3.0 [Z79.01]  Acute deep vein thrombosis (DVT) of proximal end of both lower extremities (H) [I82.4Y3]  Acute deep vein thrombosis (DVT) of other specified vein of right lower extremity (H) [I82.491]             Comments:               Anticoagulation Care Providers       Provider Role Specialty Phone number    Jose Tierney MD Referring Family Medicine [phone]    Wendy Jacob APRN CNP Referring Internal Medicine - Pediatrics [phone]

## 2024-06-13 DIAGNOSIS — I82.491 ACUTE DEEP VEIN THROMBOSIS (DVT) OF OTHER SPECIFIED VEIN OF RIGHT LOWER EXTREMITY (H): ICD-10-CM

## 2024-06-13 DIAGNOSIS — R76.0 ANTIPHOSPHOLIPID ANTIBODY POSITIVE: ICD-10-CM

## 2024-06-13 DIAGNOSIS — Z79.01 LONG TERM CURRENT USE OF ANTICOAGULANTS WITH INR GOAL OF 2.0-3.0: ICD-10-CM

## 2024-06-13 NOTE — TELEPHONE ENCOUNTER
ANTICOAGULATION MANAGEMENT:  Medication Refill    Anticoagulation Summary  As of 5/23/2024      Warfarin maintenance plan:  10 mg (5 mg x 2) every Sat; 7.5 mg (5 mg x 1.5) all other days   Next INR check:  6/6/2024   Target end date:  Indefinite    Indications    Antiphospholipid antibody positive [R76.0]  Long term current use of anticoagulants with INR goal of 2.0-3.0 [Z79.01]  Acute deep vein thrombosis (DVT) of proximal end of both lower extremities (H) [I82.4Y3]  Acute deep vein thrombosis (DVT) of other specified vein of right lower extremity (H) [I82.491]                 Anticoagulation Care Providers       Provider Role Specialty Phone number    Jose Tierney MD Referring Family Medicine [phone]    Wendy Jacob APRN CNP Referring Internal Medicine - Pediatrics [phone]            Refill Criteria    Visit with referring provider/group: Overdue for referring provider visit, last visit on 11/9/22    [facility] referral last signed: 04/02/2024; within last year: Yes    Lab monitoring not exceeding 2 weeks overdue: No    Ronna does NOT meet all criteria for refill: Visit with referring provider's group was >= 1 year ago and > 2 weeks overdue for lab monitoring . paul ni previously given on 5/15/24; routing to referring provider for further refills  per [facility] protocol    Staci Lujan RN  Anticoagulation Clinic

## 2024-06-13 NOTE — LETTER
June 18, 2024      Ronna Coleman  [address]  [place] [address]        You are overdue for INR lab recheck and follow-up with the [facility] clinic    We need to hear from the you regarding these before refilling further    Noncompliance is not good for anticoagulation management          Sincerely,        Jose Tierney MD

## 2024-06-14 ENCOUNTER — MYC MEDICAL ADVICE (OUTPATIENT)
Dept: ANTICOAGULATION | Facility: CLINIC | Age: 29
End: 2024-06-14
Payer: COMMERCIAL

## 2024-06-14 RX ORDER — WARFARIN SODIUM 5 MG/1
TABLET ORAL
Qty: 138 TABLET | Refills: 1 | OUTPATIENT
Start: 2024-06-14

## 2024-06-14 NOTE — TELEPHONE ENCOUNTER
Patient is overdue for INR lab recheck and follow-up with the [facility]  We need to hear from the patient regarding these before refilling further  Noncompliance is not good for anticoagulation management

## 2024-06-14 NOTE — TELEPHONE ENCOUNTER
This writer attempted to contact patient on 06/14/24      Reason for call provider message and left message.      If patient calls back:   Relay message below, (read verbatim), document that pt called. Give anticoag scheduling number if needed ([phone]). Standing INR order in chart.         Michelle Zepeda RN

## 2024-06-17 NOTE — TELEPHONE ENCOUNTER
2nd Attempt: This writer attempted to contact patient on 06/17/24        Reason for call: provider message and left message.        If patient calls back:              Relay message below, (read verbatim), document that pt called. Give anticoag scheduling number if needed ([phone]). Standing INR order in chart.     Sanjeev Shipley RN  [facility]

## 2024-06-18 RX ORDER — WARFARIN SODIUM 5 MG/1
TABLET ORAL
Qty: 60 TABLET | Refills: 0 | Status: SHIPPED | OUTPATIENT
Start: 2024-06-18 | End: 2024-06-28

## 2024-06-18 NOTE — TELEPHONE ENCOUNTER
3rd attempt.    This writer attempted to contact patient on 06/14/24        Reason for call provider message and left message.        If patient calls back:              Relay message below, (read verbatim), document that pt called. Give anticoag scheduling number if needed ([phone]). Standing INR order in chart.    Kristina JULIANN, RN

## 2024-06-21 ENCOUNTER — DOCUMENTATION ONLY (OUTPATIENT)
Dept: ANTICOAGULATION | Facility: CLINIC | Age: 29
End: 2024-06-21
Payer: COMMERCIAL

## 2024-06-21 NOTE — PROGRESS NOTES
ANTICOAGULATION     Ronna Coleman is overdue for an INR check.     Reminder letter sent    Elizabeth Kimble RN

## 2024-06-21 NOTE — LETTER
[facility] ANTICOAGULATION CLINIC  [address] [facility] [address]  Phone: [phone]  Fax: [phone]   June 21, 2024        Ronna Coleman  [address] [place] [address]  [place] [address]            Dear Ronna,    You are currently under the care of [facility] for your warfarin (Coumadin , Jantoven ) therapy.  We are contacting you because our records show you were due for an INR on 6-6-2024.    There are potentially serious risks when taking warfarin without careful monitoring and we want to make sure you are safely managed.  Routine lab monitoring is required for warfarin refills.     Please call [phone]  as soon as possible to schedule a lab appointment. If it is difficult for you to get to lab, please call us to discuss options.  If there has been a change in your care or other concerns, please let us know so we can help and/or update our records.         Sincerely,       [facility]

## 2024-06-22 DIAGNOSIS — Z79.01 LONG TERM CURRENT USE OF ANTICOAGULANTS WITH INR GOAL OF 2.0-3.0: ICD-10-CM

## 2024-06-22 DIAGNOSIS — R76.0 ANTIPHOSPHOLIPID ANTIBODY POSITIVE: ICD-10-CM

## 2024-06-22 DIAGNOSIS — I82.491 ACUTE DEEP VEIN THROMBOSIS (DVT) OF OTHER SPECIFIED VEIN OF RIGHT LOWER EXTREMITY (H): ICD-10-CM

## 2024-06-24 ENCOUNTER — LAB (OUTPATIENT)
Dept: LAB | Facility: CLINIC | Age: 29
End: 2024-06-24
Payer: COMMERCIAL

## 2024-06-24 DIAGNOSIS — Z79.01 LONG TERM CURRENT USE OF ANTICOAGULANTS WITH INR GOAL OF 2.0-3.0: ICD-10-CM

## 2024-06-24 DIAGNOSIS — I82.491 ACUTE DEEP VEIN THROMBOSIS (DVT) OF OTHER SPECIFIED VEIN OF RIGHT LOWER EXTREMITY (H): ICD-10-CM

## 2024-06-24 DIAGNOSIS — R76.0 ANTIPHOSPHOLIPID ANTIBODY POSITIVE: ICD-10-CM

## 2024-06-24 DIAGNOSIS — I82.4Y3 ACUTE DEEP VEIN THROMBOSIS (DVT) OF PROXIMAL END OF BOTH LOWER EXTREMITIES (H): ICD-10-CM

## 2024-06-24 LAB — INR PPP: 1.97 (ref 0.85–1.15)

## 2024-06-24 PROCEDURE — 85610 PROTHROMBIN TIME: CPT

## 2024-06-24 PROCEDURE — 36415 COLL VENOUS BLD VENIPUNCTURE: CPT

## 2024-06-24 RX ORDER — WARFARIN SODIUM 5 MG/1
TABLET ORAL
Qty: 60 TABLET | Refills: 0 | OUTPATIENT
Start: 2024-06-24

## 2024-06-25 ENCOUNTER — ANTICOAGULATION THERAPY VISIT (OUTPATIENT)
Dept: ANTICOAGULATION | Facility: CLINIC | Age: 29
End: 2024-06-25
Payer: COMMERCIAL

## 2024-06-25 DIAGNOSIS — Z79.01 LONG TERM CURRENT USE OF ANTICOAGULANTS WITH INR GOAL OF 2.0-3.0: ICD-10-CM

## 2024-06-25 DIAGNOSIS — I82.491 ACUTE DEEP VEIN THROMBOSIS (DVT) OF OTHER SPECIFIED VEIN OF RIGHT LOWER EXTREMITY (H): ICD-10-CM

## 2024-06-25 DIAGNOSIS — R76.0 ANTIPHOSPHOLIPID ANTIBODY POSITIVE: Primary | ICD-10-CM

## 2024-06-25 DIAGNOSIS — I82.4Y3 ACUTE DEEP VEIN THROMBOSIS (DVT) OF PROXIMAL END OF BOTH LOWER EXTREMITIES (H): ICD-10-CM

## 2024-06-25 NOTE — PROGRESS NOTES
ANTICOAGULATION MANAGEMENT     Ronna Coleman 28 year old female is on warfarin with subtherapeutic INR result. (Goal INR 2.0-3.0)    Recent labs: (last 7 days)     06/24/24  1405   INR 1.97*       ASSESSMENT     Source(s): Chart Review  Previous INR was Subtherapeutic  Medication, diet, health changes since last INR chart reviewed; none identified         PLAN     Recommended plan for no diet, medication or health factor changes affecting INR     Dosing Instructions: Increase your warfarin dose (4.5% change) with next INR in 2 weeks       Summary  As of 6/25/2024      Full warfarin instructions:  10 mg every Tue, Sat; 7.5 mg all other days   Next INR check:  7/8/2024               Detailed voice message left for Ronna with dosing instructions and follow up date.   Sent [employer] message with dosing and follow up instructions    Contact [phone]  to schedule and with any changes, questions or concerns.     Education provided:   Please call back if any changes to your diet, medications or how you've been taking warfarin    Plan made per [facility] anticoagulation protocol    Michelle Ye, RN  Anticoagulation Clinic  6/25/2024    _______________________________________________________________________     Anticoagulation Episode Summary       Current INR goal:  2.0-3.0   TTR:  41.9% (8.6 mo)   Target end date:  Indefinite   Send INR reminders to:  [facility]    Indications    Antiphospholipid antibody positive [R76.0]  Long term current use of anticoagulants with INR goal of 2.0-3.0 [Z79.01]  Acute deep vein thrombosis (DVT) of proximal end of both lower extremities (H) [I82.4Y3]  Acute deep vein thrombosis (DVT) of other specified vein of right lower extremity (H) [I82.491]             Comments:               Anticoagulation Care Providers       Provider Role Specialty Phone number    Jose Tierney MD Referring Family Medicine [phone]    Wendy Jacob APRN CNP Referring Internal  Medicine - Pediatrics [phone]

## 2024-06-26 ENCOUNTER — TELEPHONE (OUTPATIENT)
Dept: FAMILY MEDICINE | Facility: CLINIC | Age: 29
End: 2024-06-26
Payer: COMMERCIAL

## 2024-06-26 DIAGNOSIS — R76.0 ANTIPHOSPHOLIPID ANTIBODY POSITIVE: ICD-10-CM

## 2024-06-26 DIAGNOSIS — Z79.01 LONG TERM CURRENT USE OF ANTICOAGULANTS WITH INR GOAL OF 2.0-3.0: ICD-10-CM

## 2024-06-26 DIAGNOSIS — I82.491 ACUTE DEEP VEIN THROMBOSIS (DVT) OF OTHER SPECIFIED VEIN OF RIGHT LOWER EXTREMITY (H): ICD-10-CM

## 2024-06-27 RX ORDER — WARFARIN SODIUM 5 MG/1
TABLET ORAL
Qty: 60 TABLET | Refills: 0 | OUTPATIENT
Start: 2024-06-27

## 2024-06-27 NOTE — TELEPHONE ENCOUNTER
Noted section highlighted in blue from message below. Routing to provider to clarify if patient needs appointment with PCP or [facility]?      HEBER Vides  [facility]

## 2024-06-27 NOTE — TELEPHONE ENCOUNTER
Please call/MyChart patient to emphasize on the follow-up with [facility] clinic for ongoing care with anticoagulation

## 2024-06-27 NOTE — TELEPHONE ENCOUNTER
ANTICOAGULATION MANAGEMENT:  Medication Refill    Anticoagulation Summary  As of 6/25/2024      Warfarin maintenance plan:  10 mg (5 mg x 2) every Tue, Sat; 7.5 mg (5 mg x 1.5) all other days   Next INR check:  7/8/2024   Target end date:  Indefinite    Indications    Antiphospholipid antibody positive [R76.0]  Long term current use of anticoagulants with INR goal of 2.0-3.0 [Z79.01]  Acute deep vein thrombosis (DVT) of proximal end of both lower extremities (H) [I82.4Y3]  Acute deep vein thrombosis (DVT) of other specified vein of right lower extremity (H) [I82.491]                 Anticoagulation Care Providers       Provider Role Specialty Phone number    Jose Tierney MD Referring Family Medicine [phone]    Wendy Jacob APRN CNP Referring Internal Medicine - Pediatrics [phone]            Refill Criteria    Visit with referring provider/group: Overdue for referring provider visit, last visit on 11/22    [facility] referral last signed: 04/02/2024; within last year: Yes    Lab monitoring not exceeding 2 weeks overdue: Yes    Ronna does NOT meet all criteria for refill: Visit with referring provider's group was >= 1 year ago. paul fill previously given on 3/7/24; routing to referring provider for further refills  per [facility] protocol    Michelle Ye, RN  Anticoagulation Clinic

## 2024-06-28 RX ORDER — WARFARIN SODIUM 5 MG/1
TABLET ORAL
Qty: 90 TABLET | Refills: 0 | Status: SHIPPED | OUTPATIENT
Start: 2024-06-28 | End: 2024-08-23

## 2024-06-28 NOTE — TELEPHONE ENCOUNTER
With [facility] for anticoagulation which is important for her to follow-up  I see that she is scheduled with PCP in September 2024

## 2024-06-28 NOTE — TELEPHONE ENCOUNTER
Called patient and relayed provider's message below. Patient states she already had [facility] visit recently on Monday. States she picked up her last refill of warfarin last week on Friday 6/21 but will need additional refills.     Routing to PCP to review and advise.     HEBER Vides  [facility]

## 2024-07-09 ENCOUNTER — MYC MEDICAL ADVICE (OUTPATIENT)
Dept: ANTICOAGULATION | Facility: CLINIC | Age: 29
End: 2024-07-09
Payer: COMMERCIAL

## 2024-07-25 ENCOUNTER — LAB (OUTPATIENT)
Dept: LAB | Facility: CLINIC | Age: 29
End: 2024-07-25
Payer: COMMERCIAL

## 2024-07-25 DIAGNOSIS — Z79.01 LONG TERM CURRENT USE OF ANTICOAGULANTS WITH INR GOAL OF 2.0-3.0: ICD-10-CM

## 2024-07-25 DIAGNOSIS — I82.491 ACUTE DEEP VEIN THROMBOSIS (DVT) OF OTHER SPECIFIED VEIN OF RIGHT LOWER EXTREMITY (H): ICD-10-CM

## 2024-07-25 DIAGNOSIS — I82.4Y3 ACUTE DEEP VEIN THROMBOSIS (DVT) OF PROXIMAL END OF BOTH LOWER EXTREMITIES (H): ICD-10-CM

## 2024-07-25 DIAGNOSIS — R76.0 ANTIPHOSPHOLIPID ANTIBODY POSITIVE: ICD-10-CM

## 2024-07-25 PROCEDURE — 36415 COLL VENOUS BLD VENIPUNCTURE: CPT

## 2024-07-25 PROCEDURE — 85610 PROTHROMBIN TIME: CPT

## 2024-07-26 ENCOUNTER — ANTICOAGULATION THERAPY VISIT (OUTPATIENT)
Dept: ANTICOAGULATION | Facility: CLINIC | Age: 29
End: 2024-07-26
Payer: COMMERCIAL

## 2024-07-26 LAB — INR PPP: 2.53 (ref 0.85–1.15)

## 2024-08-23 DIAGNOSIS — I82.491 ACUTE DEEP VEIN THROMBOSIS (DVT) OF OTHER SPECIFIED VEIN OF RIGHT LOWER EXTREMITY (H): ICD-10-CM

## 2024-08-23 DIAGNOSIS — R76.0 ANTIPHOSPHOLIPID ANTIBODY POSITIVE: ICD-10-CM

## 2024-08-23 DIAGNOSIS — Z79.01 LONG TERM CURRENT USE OF ANTICOAGULANTS WITH INR GOAL OF 2.0-3.0: ICD-10-CM

## 2024-08-23 RX ORDER — WARFARIN SODIUM 5 MG/1
TABLET ORAL
Qty: 132 TABLET | Refills: 0 | Status: SHIPPED | OUTPATIENT
Start: 2024-08-23 | End: 2024-09-27

## 2024-08-23 NOTE — TELEPHONE ENCOUNTER
ANTICOAGULATION MANAGEMENT:  Medication Refill    Anticoagulation Summary  As of 7/26/2024      Warfarin maintenance plan:  10 mg (5 mg x 2) every Sat; 7.5 mg (5 mg x 1.5) all other days   Next INR check:  8/29/2024   Target end date:  Indefinite    Indications    Antiphospholipid antibody positive [R76.0]  Long term current use of anticoagulants with INR goal of 2.0-3.0 [Z79.01]  Acute deep vein thrombosis (DVT) of proximal end of both lower extremities (H) [I82.4Y3]  Acute deep vein thrombosis (DVT) of other specified vein of right lower extremity (H) [I82.491]                 Anticoagulation Care Providers       Provider Role Specialty Phone number    Jose Tierney MD Referring Family Medicine [phone]    Wendy Jacob APRN CNP Referring Family Medicine [phone]            Refill Criteria    Visit with referring provider/group: Overdue for referring provider visit, last visit on 11/9/22    [facility] referral last signed: 04/02/2024; within last year: Yes    Lab monitoring not exceeding 2 weeks overdue: Yes    Ronna does NOT meet all criteria for refill: Visit with referring provider's group was >= 15 months ago. 90 day paul fill approved; patient notified to schedule with referring provider per [facility] protocol    Staci Lujan RN  Anticoagulation Clinic

## 2024-09-05 ENCOUNTER — TRANSFERRED RECORDS (OUTPATIENT)
Dept: MULTI SPECIALTY CLINIC | Facility: CLINIC | Age: 29
End: 2024-09-05

## 2024-09-05 LAB — HBA1C MFR BLD: 7.1 %

## 2024-09-10 ENCOUNTER — TELEPHONE (OUTPATIENT)
Dept: ANTICOAGULATION | Facility: CLINIC | Age: 29
End: 2024-09-10
Payer: COMMERCIAL

## 2024-09-10 NOTE — TELEPHONE ENCOUNTER
ANTICOAGULATION     Cecilio Coleman is overdue for an INR check.     Spoke with cecilio and scheduled lab appointment on 9/12/24    Staci Lujan, RN  9/10/2024  Anticoagulation Clinic  [facility] for routing messages: p [facility] [place]  [facility] patient phone line: [phone]

## 2024-09-12 ENCOUNTER — LAB (OUTPATIENT)
Dept: LAB | Facility: CLINIC | Age: 29
End: 2024-09-12
Payer: COMMERCIAL

## 2024-09-12 DIAGNOSIS — I82.4Y3 ACUTE DEEP VEIN THROMBOSIS (DVT) OF PROXIMAL END OF BOTH LOWER EXTREMITIES (H): ICD-10-CM

## 2024-09-12 DIAGNOSIS — I82.491 ACUTE DEEP VEIN THROMBOSIS (DVT) OF OTHER SPECIFIED VEIN OF RIGHT LOWER EXTREMITY (H): ICD-10-CM

## 2024-09-12 DIAGNOSIS — R76.0 ANTIPHOSPHOLIPID ANTIBODY POSITIVE: ICD-10-CM

## 2024-09-12 DIAGNOSIS — Z79.01 LONG TERM CURRENT USE OF ANTICOAGULANTS WITH INR GOAL OF 2.0-3.0: ICD-10-CM

## 2024-09-12 PROCEDURE — 85610 PROTHROMBIN TIME: CPT

## 2024-09-12 PROCEDURE — 36415 COLL VENOUS BLD VENIPUNCTURE: CPT

## 2024-09-13 ENCOUNTER — ANTICOAGULATION THERAPY VISIT (OUTPATIENT)
Dept: ANTICOAGULATION | Facility: CLINIC | Age: 29
End: 2024-09-13
Payer: COMMERCIAL

## 2024-09-13 DIAGNOSIS — I82.4Y3 ACUTE DEEP VEIN THROMBOSIS (DVT) OF PROXIMAL END OF BOTH LOWER EXTREMITIES (H): ICD-10-CM

## 2024-09-13 DIAGNOSIS — Z79.01 LONG TERM CURRENT USE OF ANTICOAGULANTS WITH INR GOAL OF 2.0-3.0: ICD-10-CM

## 2024-09-13 DIAGNOSIS — R76.0 ANTIPHOSPHOLIPID ANTIBODY POSITIVE: Primary | ICD-10-CM

## 2024-09-13 DIAGNOSIS — I82.491 ACUTE DEEP VEIN THROMBOSIS (DVT) OF OTHER SPECIFIED VEIN OF RIGHT LOWER EXTREMITY (H): ICD-10-CM

## 2024-09-13 LAB — INR PPP: 1.96 (ref 0.85–1.15)

## 2024-09-13 NOTE — PROGRESS NOTES
ANTICOAGULATION MANAGEMENT     Ronna Coleman 28 year old female is on warfarin with subtherapeutic INR result. (Goal INR 2.0-3.0)    Recent labs: (last 7 days)     09/12/24  1520   INR 1.96*       ASSESSMENT     Warfarin Lab Questionnaire    Warfarin Doses Last 7 Days      9/12/2024     2:58 PM   Dose in Tablet or Mg   TAB or MG? tablet (tab)     Pt Rptd Dose SUNDAY MONDAY TUESDAY WED THURS FRIDAY SATURDAY 9/12/2024   2:58 PM 1.5 1.5 1.5 1.5 1.5 1.5 2         9/12/2024   Warfarin Lab Questionnaire   Missed doses within past 14 days? No   Changes in diet or alcohol within past 14 days? No   Medication changes since last result? No   Injuries or illness since last result? No   New shortness of breath, severe headaches or sudden changes in vision since last result? No   Abnormal bleeding since last result? No   Upcoming surgery, procedure? No        Previous result: Therapeutic last visit; previously outside of goal range  Additional findings: None       PLAN     Recommended plan for no diet, medication or health factor changes affecting INR     Dosing Instructions: Increase your warfarin dose (4.5% change) with next INR in 2 weeks       Summary  As of 9/13/2024      Full warfarin instructions:  10 mg every Tue, Sat; 7.5 mg all other days   Next INR check:  9/27/2024               Detailed voice message left for Ronna with dosing instructions and follow up date.   Sent [employer] message with dosing and follow up instructions    Check at provider office visit    Education provided: Please call back if any changes to your diet, medications or how you've been taking warfarin    Plan made per [facility] anticoagulation protocol    Bailey Mercer RN  9/13/2024  Anticoagulation Clinic  [facility] for routing messages: p [facility] patient phone line: [phone]        _______________________________________________________________________     Anticoagulation Episode Summary       Current INR goal:  2.0-3.0    TTR:  49.9% (8.6 mo)   Target end date:  Indefinite   Send INR reminders to:  [facility] [place]    Indications    Antiphospholipid antibody positive [R76.0]  Long term current use of anticoagulants with INR goal of 2.0-3.0 [Z79.01]  Acute deep vein thrombosis (DVT) of proximal end of both lower extremities (H) [I82.4Y3]  Acute deep vein thrombosis (DVT) of other specified vein of right lower extremity (H) [I82.491]             Comments:               Anticoagulation Care Providers       Provider Role Specialty Phone number    Jose Tierney MD Referring Family Medicine [phone]    Wendy Jacob APRN CNP Referring Family Medicine [phone]

## 2024-09-27 ENCOUNTER — OFFICE VISIT (OUTPATIENT)
Dept: FAMILY MEDICINE | Facility: CLINIC | Age: 29
End: 2024-09-27
Payer: COMMERCIAL

## 2024-09-27 VITALS
DIASTOLIC BLOOD PRESSURE: 78 MMHG | RESPIRATION RATE: 12 BRPM | TEMPERATURE: 98.5 F | HEART RATE: 108 BPM | SYSTOLIC BLOOD PRESSURE: 113 MMHG | OXYGEN SATURATION: 98 % | HEIGHT: 69 IN | BODY MASS INDEX: 33.1 KG/M2 | WEIGHT: 223.5 LBS

## 2024-09-27 DIAGNOSIS — E10.9 TYPE 1 DIABETES MELLITUS WITHOUT COMPLICATION (H): ICD-10-CM

## 2024-09-27 DIAGNOSIS — Z00.00 ROUTINE GENERAL MEDICAL EXAMINATION AT A HEALTH CARE FACILITY: Primary | ICD-10-CM

## 2024-09-27 DIAGNOSIS — K90.0 CELIAC DISEASE: ICD-10-CM

## 2024-09-27 DIAGNOSIS — Z12.4 CERVICAL CANCER SCREENING: ICD-10-CM

## 2024-09-27 DIAGNOSIS — I82.491 ACUTE DEEP VEIN THROMBOSIS (DVT) OF OTHER SPECIFIED VEIN OF RIGHT LOWER EXTREMITY (H): ICD-10-CM

## 2024-09-27 DIAGNOSIS — E06.3 HYPOTHYROIDISM DUE TO HASHIMOTO'S THYROIDITIS: ICD-10-CM

## 2024-09-27 DIAGNOSIS — Z79.01 LONG TERM CURRENT USE OF ANTICOAGULANTS WITH INR GOAL OF 2.0-3.0: ICD-10-CM

## 2024-09-27 DIAGNOSIS — R76.0 ANTIPHOSPHOLIPID ANTIBODY POSITIVE: ICD-10-CM

## 2024-09-27 PROCEDURE — 99213 OFFICE O/P EST LOW 20 MIN: CPT | Mod: 25 | Performed by: FAMILY MEDICINE

## 2024-09-27 PROCEDURE — 99395 PREV VISIT EST AGE 18-39: CPT | Mod: 25 | Performed by: FAMILY MEDICINE

## 2024-09-27 RX ORDER — LEVOTHYROXINE SODIUM 175 UG/1
175 TABLET ORAL DAILY
COMMUNITY

## 2024-09-27 RX ORDER — WARFARIN SODIUM 5 MG/1
TABLET ORAL
Qty: 200 TABLET | Refills: 2 | Status: SHIPPED | OUTPATIENT
Start: 2024-09-27

## 2024-09-27 ASSESSMENT — PAIN SCALES - GENERAL: PAINLEVEL: NO PAIN (0)

## 2024-09-27 NOTE — PATIENT INSTRUCTIONS
Patient Education   Preventive Care Advice   This is general advice given by our system to help you stay healthy. However, your care team may have specific advice just for you. Please talk to your care team about your preventive care needs.  Nutrition  Eat 5 or more servings of fruits and vegetables each day.  Try wheat bread, brown rice and whole grain pasta (instead of white bread, rice, and pasta).  Get enough calcium and vitamin D. Check the label on foods and aim for 100% of the RDA (recommended daily allowance).  Lifestyle  Exercise at least 150 minutes each week  (30 minutes a day, 5 days a week).  Do muscle strengthening activities 2 days a week. These help control your weight and prevent disease.  No smoking.  Wear sunscreen to prevent skin cancer.  Have a dental exam and cleaning every 6 months.  Yearly exams  See your health care team every year to talk about:  Any changes in your health.  Any medicines your care team has prescribed.  Preventive care, family planning, and ways to prevent chronic diseases.  Shots (vaccines)   HPV shots (up to age 26), if you've never had them before.  Hepatitis B shots (up to age 59), if you've never had them before.  COVID-19 shot: Get this shot when it's due.  Flu shot: Get a flu shot every year.  Tetanus shot: Get a tetanus shot every 10 years.  Pneumococcal, hepatitis A, and RSV shots: Ask your care team if you need these based on your risk.  Shingles shot (for age 50 and up)  General health tests  Diabetes screening:  Starting at age 35, Get screened for diabetes at least every 3 years.  If you are younger than age 35, ask your care team if you should be screened for diabetes.  Cholesterol test: At age 39, start having a cholesterol test every 5 years, or more often if advised.  Bone density scan (DEXA): At age 50, ask your care team if you should have this scan for osteoporosis (brittle bones).  Hepatitis C: Get tested at least once in your life.  STIs (sexually  transmitted infections)  Before age 24: Ask your care team if you should be screened for STIs.  After age 24: Get screened for STIs if you're at risk. You are at risk for STIs (including HIV) if:  You are sexually active with more than one person.  You don't use condoms every time.  You or a partner was diagnosed with a sexually transmitted infection.  If you are at risk for HIV, ask about PrEP medicine to prevent HIV.  Get tested for HIV at least once in your life, whether you are at risk for HIV or not.  Cancer screening tests  Cervical cancer screening: If you have a cervix, begin getting regular cervical cancer screening tests starting at age 21.  Breast cancer scan (mammogram): If you've ever had breasts, begin having regular mammograms starting at age 40. This is a scan to check for breast cancer.  Colon cancer screening: It is important to start screening for colon cancer at age 45.  Have a colonoscopy test every 10 years (or more often if you're at risk) Or, ask your provider about stool tests like a FIT test every year or Cologuard test every 3 years.  To learn more about your testing options, visit:   .  For help making a decision, visit:   https://bit.ly/wv78271.  Prostate cancer screening test: If you have a prostate, ask your care team if a prostate cancer screening test (PSA) at age 55 is right for you.  Lung cancer screening: If you are a current or former smoker ages 50 to 80, ask your care team if ongoing lung cancer screenings are right for you.  For informational purposes only. Not to replace the advice of your health care provider. Copyright   2023 [employer]. All rights reserved. Clinically reviewed by the [facility] Transitions Program. [facility] [id number] - REV 01/24.

## 2024-09-27 NOTE — PROGRESS NOTES
Preventive Care Visit  [facility]  Jose Tierney MD, Family Medicine  Sep 27, 2024      Assessment & Plan     Routine general medical examination at a health care facility  Discussed on regular exercises, healthy eating,  and routine dental checks.      Antiphospholipid antibody positive  Stable, continue with current anticoagulation, recheck in 1 year or sooner if needed continue follow-up with the INR clinic    - warfarin ANTICOAGULANT (COUMADIN) 5 MG tablet; TAKE 2 TABLETS BY MOUTH ON SATURDAY, AND TAKE 1.5 TABLETS ALL OTHER DAYS, OR AS DIRECTED BY COUMADIN CLINIC NEEDS TO SEE DOCTOR FOR FURTHER REFILLS    Long term current use of anticoagulants with INR goal of 2.0-3.0  As above  - warfarin ANTICOAGULANT (COUMADIN) 5 MG tablet; TAKE 2 TABLETS BY MOUTH ON SATURDAY, AND TAKE 1.5 TABLETS ALL OTHER DAYS, OR AS DIRECTED BY COUMADIN CLINIC NEEDS TO SEE DOCTOR FOR FURTHER REFILLS    Acute deep vein thrombosis (DVT) of other specified vein of right lower extremity (H)  As above  - warfarin ANTICOAGULANT (COUMADIN) 5 MG tablet; TAKE 2 TABLETS BY MOUTH ON SATURDAY, AND TAKE 1.5 TABLETS ALL OTHER DAYS, OR AS DIRECTED BY COUMADIN CLINIC NEEDS TO SEE DOCTOR FOR FURTHER REFILLS    Type 1 diabetes mellitus without complication (H)  Patient follows up with endocrinology team at the [facility]  Reviewed last A1c of 7.1 through Care Everywhere from last month      Hypothyroidism due to Hashimoto's thyroiditis  Reviewed TSH of 0.05 from 3 weeks ago that was done at the [facility] system  Patient is not aware of it  Patient is currently taking levothyroxine 175 mcg daily  Patient understands to call her endocrinology team for medication dose change if needed      Celiac disease  Continue with gluten-free diet    Cervical cancer screening  Patient is not due for Pap until next year                BMI  Estimated body mass index is 32.92 kg/m  as calculated from the following:    Height as  "of this encounter: 1.755 m (5' 9.09\").    Weight as of this encounter: 101.4 kg (223 lb 8 oz).   Weight management plan: Discussed healthy diet and exercise guidelines    Counseling  Appropriate preventive services were addressed with this patient via screening, questionnaire, or discussion as appropriate for fall prevention, nutrition, physical activity, Tobacco-use cessation, social engagement, weight loss and cognition.  Checklist reviewing preventive services available has been given to the patient.  Reviewed patient's diet, addressing concerns and/or questions.   She is at risk for lack of exercise and has been provided with information to increase physical activity for the benefit of her well-being.       Work on weight loss  Regular exercise  Chart documentation done in part with Dragon Voice recognition Software. Although reviewed after completion, some word and grammatical error may remain.    See Patient Instructions    Latoya Friend is a 28 year old, presenting for the following:  Physical        9/27/2024     2:17 PM   Additional Questions   Roomed by Feyl ALCAZAR      Medication Followup of current for chronic anticoagulation with history of DVT and antiphospholipid syndrome  Taking Medication as prescribed: yes  Side Effects:  None  Medication Helping Symptoms:  yes        9/26/2024   General Health   How would you rate your overall physical health? (!) FAIR   Feel stress (tense, anxious, or unable to sleep) To some extent      (!) STRESS CONCERN      9/26/2024   Nutrition   Three or more servings of calcium each day? (!) NO   Diet: Gluten-free/reduced   How many servings of fruit and vegetables per day? (!) 0-1   How many sweetened beverages each day? (!) 2            9/26/2024   Exercise   Days per week of moderate/strenous exercise 2 days   Average minutes spent exercising at this level 20 min      (!) EXERCISE CONCERN      9/26/2024   Social Factors   Frequency of gathering with friends " or relatives Three times a week   Worry food won't last until get money to buy more No   Food not last or not have enough money for food? No   Do you have housing? (Housing is defined as stable permanent housing and does not include staying ouside in a car, in a tent, in an abandoned building, in an overnight shelter, or couch-surfing.) Yes   Are you worried about losing your housing? No   Lack of transportation? No   Unable to get utilities (heat,electricity)? No            9/26/2024   Dental   Dentist two times every year? Yes            9/26/2024   TB Screening   Were you born outside of the [place]? No            Today's PHQ-2 Score:       9/26/2024     8:30 PM   PHQ-2 ( 1999 [employer])   Q1: Little interest or pleasure in doing things 0   Q2: Feeling down, depressed or hopeless 0   PHQ-2 Score 0   Q1: Little interest or pleasure in doing things Not at all   Q2: Feeling down, depressed or hopeless Not at all   PHQ-2 Score 0           9/26/2024   Substance Use   Alcohol more than 3/day or more than 7/wk No   Do you use any other substances recreationally? No        Social History     Tobacco Use    Smoking status: Never    Smokeless tobacco: Never   Vaping Use    Vaping status: Never Used   Substance Use Topics    Alcohol use: Not Currently    Drug use: Never          Mammogram Screening - Patient under 40 years of age: Routine Mammogram Screening not recommended.         9/26/2024   STI Screening   New sexual partner(s) since last STI/HIV test? No        History of abnormal Pap smear: No - age 21-29 PAP every 3 years recommended        11/9/2022    11:22 AM 8/27/2019    10:23 AM   PAP / HPV   PAP Negative for Intraepithelial Lesion or Malignancy (NILM)     PAP (Historical)  NIL            9/26/2024   Contraception/Family Planning   Questions about contraception or family planning No           Reviewed and updated as needed this visit by Provider                    Past Medical History:   Diagnosis Date    Celiac disease      Hypothyroidism     Type 1 diabetes (H)      Past Surgical History:   Procedure Laterality Date    BIOPSY  2008    NO HISTORY OF SURGERY       OB History   No obstetric history on file.     Lab work is in process  Labs reviewed in EPIC  BP Readings from Last 3 Encounters:   09/27/24 113/78   11/09/22 123/77   10/04/19 125/75    Wt Readings from Last 3 Encounters:   09/27/24 101.4 kg (223 lb 8 oz)   11/09/22 93.6 kg (206 lb 4.8 oz)   10/04/19 90.3 kg (199 lb 1.6 oz)                  Patient Active Problem List   Diagnosis    Hypothyroidism    Type 1 diabetes (H)    Celiac disease    Family history of clotting disorder    Acute deep vein thrombosis (DVT) of proximal end of both lower extremities (H)    Antiphospholipid antibody positive    Long term current use of anticoagulants with INR goal of 2.0-3.0    Overweight (BMI 25.0-29.9)    Seborrheic dermatitis    Obesity (BMI 30-39.9)    Acute deep vein thrombosis (DVT) of other specified vein of right lower extremity (H)     Past Surgical History:   Procedure Laterality Date    BIOPSY  2008    NO HISTORY OF SURGERY         Social History     Tobacco Use    Smoking status: Never    Smokeless tobacco: Never   Substance Use Topics    Alcohol use: Not Currently     Family History   Problem Relation Age of Onset    Hypertension Father     Hypertension Maternal Grandfather     Hypertension Paternal Grandfather     Diabetes Paternal Uncle     Clotting Disorder Maternal Grandmother          Current Outpatient Medications   Medication Sig Dispense Refill    fish oil-omega-3 fatty acids 1000 MG capsule Take 1 g by mouth daily      insulin glargine (BASAGLAR KWIKPEN) 100 UNIT/ML pen Inject 45 Units Subcutaneous      Insulin Infusion Pump Supplies (MINIMED RESERVOIR 3ML) MISC Change every 3 days, 3 boxes of 10 for 90 days      insulin lispro (HUMALOG VIAL) 100 UNIT/ML vial       levothyroxine (SYNTHROID/LEVOTHROID) 175 MCG tablet Take 175 mcg by mouth daily.      warfarin  ANTICOAGULANT (COUMADIN) 5 MG tablet TAKE 2 TABLETS BY MOUTH ON SATURDAY, AND TAKE 1.5 TABLETS ALL OTHER DAYS, OR AS DIRECTED BY COUMADIN CLINIC NEEDS TO SEE DOCTOR FOR FURTHER REFILLS 200 tablet 2     Allergies   Allergen Reactions    Gluten Meal GI Disturbance     Recent Labs   Lab Test 06/13/23  1500 01/03/20  1432 08/27/19  1006 05/24/19  1138 05/17/19  0000 02/15/19  0000 04/13/17  0000 03/22/17  0000 07/28/16  0000   A1C  --   --   --   --  8.1*  --   --   --   --    LDL  --   --   --   --   --  100  --  119 143*   HDL  --   --   --   --   --  43  --  42  --    TRIG  --   --   --   --   --  52  --  176*  --    ALT  --   --   --  24  --   --   --   --   --    CR  --   --   --  0.66  --  0.69   < >  --  0.64   GFRESTIMATED  --   --   --  >90  --  >60   < >  --  >60   GFRESTBLACK  --   --   --  >90  --  >60   < >  --  >60   POTASSIUM  --   --   --  4.0  --   --   --   --   --    TSH 0.91 1.59   < > 9.08*  --  5.01*   < >  --  1.89    < > = values in this interval not displayed.          Review of Systems  CONSTITUTIONAL: NEGATIVE for fever, chills, change in weight  INTEGUMENTARY/SKIN: NEGATIVE for worrisome rashes, moles or lesions  EYES: NEGATIVE for vision changes or irritation  ENT/MOUTH: NEGATIVE for ear, mouth and throat problems  RESP: NEGATIVE for significant cough or SOB  BREAST: NEGATIVE for masses, tenderness or discharge  CV: NEGATIVE for chest pain, palpitations or peripheral edema  GI: NEGATIVE for nausea, abdominal pain, heartburn, or change in bowel habits  : NEGATIVE for frequency, dysuria, or hematuria  MUSCULOSKELETAL: NEGATIVE for significant arthralgias or myalgia  NEURO: NEGATIVE for weakness, dizziness or paresthesias  ENDOCRINE: History of hypothyroidism and type 1 diabetes  HEME/ALLERGY/IMMUNE: History of antiphospholipid syndrome, DVT and on chronic anticoagulation with Coumadin  PSYCHIATRIC: NEGATIVE for changes in mood or affect     Objective    Exam  /78 (BP Location: Right  "arm, Cuff Size: Adult Regular)   Pulse 108   Temp 98.5  F (36.9  C)   Resp 12   Ht 1.755 m (5' 9.09\")   Wt 101.4 kg (223 lb 8 oz)   LMP 09/03/2024   SpO2 98%   BMI 32.92 kg/m     Estimated body mass index is 32.92 kg/m  as calculated from the following:    Height as of this encounter: 1.755 m (5' 9.09\").    Weight as of this encounter: 101.4 kg (223 lb 8 oz).    Physical Exam  GENERAL: alert and no distress  EYES: Eyes grossly normal to inspection, PERRL and conjunctivae and sclerae normal  HENT: ear canals and TM's normal, nose and mouth without ulcers or lesions  NECK: no adenopathy, no asymmetry, masses, or scars  RESP: lungs clear to auscultation - no rales, rhonchi or wheezes  BREAST: normal without masses, tenderness or nipple discharge and no palpable axillary masses or adenopathy  CV: regular rate and rhythm, normal S1 S2, no S3 or S4, no murmur, click or rub, no peripheral edema  ABDOMEN: soft, nontender, no hepatosplenomegaly, no masses and bowel sounds normal  MS: no gross musculoskeletal defects noted, no edema  SKIN: no suspicious lesions or rashes  NEURO: Normal strength and tone, mentation intact and speech normal  PSYCH: mentation appears normal, affect normal/bright        Signed Electronically by: Jose Tierney MD    "

## 2024-10-04 ENCOUNTER — MYC MEDICAL ADVICE (OUTPATIENT)
Dept: ANTICOAGULATION | Facility: CLINIC | Age: 29
End: 2024-10-04
Payer: COMMERCIAL

## 2024-10-11 ENCOUNTER — MYC MEDICAL ADVICE (OUTPATIENT)
Dept: ANTICOAGULATION | Facility: CLINIC | Age: 29
End: 2024-10-11
Payer: COMMERCIAL

## 2024-10-18 ENCOUNTER — DOCUMENTATION ONLY (OUTPATIENT)
Dept: ANTICOAGULATION | Facility: CLINIC | Age: 29
End: 2024-10-18
Payer: COMMERCIAL

## 2024-10-18 NOTE — PROGRESS NOTES
ANTICOAGULATION     Ronna Coleman is overdue for an INR check.     Reminder letter sent    Staci Lujan RN  10/18/2024  Anticoagulation Clinic  [facility] for routing messages: p [facility]AG [facility] patient phone line: [phone]

## 2024-10-18 NOTE — LETTER
[facility] ANTICOAGULATION CLINIC  [address] [facility] [address]  Phone: [phone]  Fax: [phone]       October 18, 2024        Ronna Coleman  [address] [place] [address]  [facility] [address]            Dear Ronna,    You are currently under the care of [facility]tico[facility] for your warfarin (Coumadin , Jantoven ) therapy.  We are contacting you because our records show you were due for an INR on 9/27/24.    There are potentially serious risks when taking warfarin without careful monitoring and we want to make sure you are safely managed.  Routine lab monitoring is required for warfarin refills.     Please call [phone] as soon as possible to schedule a lab appointment. If it is difficult for you to get to lab, please call us to discuss options.  If there has been a change in your care or other concerns, please let us know so we can help and/or update our records.         Sincerely,       [facility]

## 2024-11-01 ENCOUNTER — TELEPHONE (OUTPATIENT)
Dept: ANTICOAGULATION | Facility: CLINIC | Age: 29
End: 2024-11-01
Payer: COMMERCIAL

## 2024-11-01 NOTE — LETTER
[facility] ANTICOAGULATION CLINIC  [address] [facility] [address]  Phone: [phone]  Fax: [phone]   November 1, 2024        Ronna Coleman  [address] [place] [address]  [place] [facility] [address]            Dear Ronna,    You are currently under the care of [facility]tico[facility] for your warfarin (Coumadin , Jantoven ) therapy.  We are contacting you because our records show you were due for an INR on 9/27/24.    There are potentially serious risks when taking warfarin without careful monitoring and we want to make sure you are safely managed.  Routine lab monitoring is required for warfarin refills.     Please call [phone] as soon as possible to schedule a lab appointment. If it is difficult for you to get to lab, please call us to discuss options.  If there has been a change in your care or other concerns, please let us know so we can help and/or update our records.         Sincerely,       [facility]

## 2024-11-01 NOTE — TELEPHONE ENCOUNTER
Anticoagulation Clinic Notification    Ronna, is past due for an INR. Their last result was 1.96 on 9/13/24 and was due to come back on 9/27/24.    she received phone calls and letters over the last several weeks in attempt to arrange follow up labs. Ronna Coleman will be contacted again today.     Please contact patient directly to discuss compliance with monitoring or schedule visit to review ongoing anticoagulation therapy.    Thank you,     [facility] Anticoagulation Clinic

## 2024-11-01 NOTE — TELEPHONE ENCOUNTER
ANTICOAGULATION     Ronna Coleman is overdue for an INR check.     Left message for patient to call and schedule lab appointment as soon as possible. If returning call, please schedule.  and Reminder letter sent    Jl Briseno RN  11/1/2024  Anticoagulation Clinic  [facility] for routing messages: p ANT[facility]AG [facility] patient phone line: [phone]

## 2024-11-14 ENCOUNTER — LAB (OUTPATIENT)
Dept: LAB | Facility: CLINIC | Age: 29
End: 2024-11-14
Payer: COMMERCIAL

## 2024-11-14 DIAGNOSIS — I82.4Y3 ACUTE DEEP VEIN THROMBOSIS (DVT) OF PROXIMAL END OF BOTH LOWER EXTREMITIES (H): ICD-10-CM

## 2024-11-14 DIAGNOSIS — I82.491 ACUTE DEEP VEIN THROMBOSIS (DVT) OF OTHER SPECIFIED VEIN OF RIGHT LOWER EXTREMITY (H): ICD-10-CM

## 2024-11-14 DIAGNOSIS — R76.0 ANTIPHOSPHOLIPID ANTIBODY POSITIVE: ICD-10-CM

## 2024-11-14 DIAGNOSIS — Z79.01 LONG TERM CURRENT USE OF ANTICOAGULANTS WITH INR GOAL OF 2.0-3.0: ICD-10-CM

## 2024-11-14 PROCEDURE — 36415 COLL VENOUS BLD VENIPUNCTURE: CPT

## 2024-11-14 PROCEDURE — 85610 PROTHROMBIN TIME: CPT

## 2024-11-15 ENCOUNTER — ANTICOAGULATION THERAPY VISIT (OUTPATIENT)
Dept: ANTICOAGULATION | Facility: CLINIC | Age: 29
End: 2024-11-15
Payer: COMMERCIAL

## 2024-11-15 DIAGNOSIS — R76.0 ANTIPHOSPHOLIPID ANTIBODY POSITIVE: Primary | ICD-10-CM

## 2024-11-15 DIAGNOSIS — I82.491 ACUTE DEEP VEIN THROMBOSIS (DVT) OF OTHER SPECIFIED VEIN OF RIGHT LOWER EXTREMITY (H): ICD-10-CM

## 2024-11-15 DIAGNOSIS — I82.4Y3 ACUTE DEEP VEIN THROMBOSIS (DVT) OF PROXIMAL END OF BOTH LOWER EXTREMITIES (H): ICD-10-CM

## 2024-11-15 DIAGNOSIS — Z79.01 LONG TERM CURRENT USE OF ANTICOAGULANTS WITH INR GOAL OF 2.0-3.0: ICD-10-CM

## 2024-11-15 LAB — INR PPP: 1.66 (ref 0.85–1.15)

## 2024-11-15 NOTE — PROGRESS NOTES
ANTICOAGULATION MANAGEMENT     Ronna Coleman 28 year old female is on warfarin with subtherapeutic INR result. (Goal INR 2.0-3.0)    Recent labs: (last 7 days)     11/14/24  1418   INR 1.66*       ASSESSMENT     Warfarin Lab Questionnaire    Warfarin Doses Last 7 Days      11/13/2024     5:26 PM   Dose in Tablet or Mg   TAB or MG? tablet (tab)     Pt Rptd Dose SUNDAY MONDAY TUESDAY WED THURS FRIDAY SATURDAY 11/13/2024   5:26 PM 1.5 1.5 1.5 1.5 1.5 1.5 2         11/13/2024   Warfarin Lab Questionnaire   Missed doses within past 14 days? No   Changes in diet or alcohol within past 14 days? No   Medication changes since last result? No   Injuries or illness since last result? No   New shortness of breath, severe headaches or sudden changes in vision since last result? No   Abnormal bleeding since last result? No   Upcoming surgery, procedure? No        Previous result: Subtherapeutic  Additional findings: None       PLAN     Recommended plan for no diet, medication or health factor changes affecting INR     Dosing Instructions: One time booster dose then Increase your warfarin dose (8.7% change) with next INR in 1-2 weeks       Summary  As of 11/15/2024      Full warfarin instructions:  7.5 mg every Mon, Wed, Fri; 10 mg all other days   Next INR check:  11/29/2024               Telephone call with Ronna who verbalizes understanding and agrees to plan    Lab visit scheduled    Education provided: Goal range and lab monitoring: goal range and significance of current result and Importance of therapeutic range  Symptom monitoring: monitoring for clotting signs and symptoms and when to seek medical attention/emergency care  Contact [phone] with any changes, questions or concerns.     Plan made per [facility] anticoagulation protocol    Rachel Sun RN  11/15/2024  Anticoagulation Clinic  [facility] for routing messages: p ANT[facility]AG [facility] patient phone line:  [phone]        _______________________________________________________________________     Anticoagulation Episode Summary       Current INR goal:  2.0-3.0   TTR:  41.6% (8.6 mo)   Target end date:  Indefinite   Send INR reminders to:  [facility] [place]    Indications    Antiphospholipid antibody positive [R76.0]  Long term current use of anticoagulants with INR goal of 2.0-3.0 [Z79.01]  Acute deep vein thrombosis (DVT) of proximal end of both lower extremities (H) [I82.4Y3]  Acute deep vein thrombosis (DVT) of other specified vein of right lower extremity (H) [I82.491]             Comments:  --             Anticoagulation Care Providers       Provider Role Specialty Phone number    Jose Tierney MD Referring Family Medicine [phone]    Wendy Jacob APRN CNP Referring Family Medicine [phone]

## 2024-11-25 ENCOUNTER — LAB (OUTPATIENT)
Dept: LAB | Facility: CLINIC | Age: 29
End: 2024-11-25
Payer: COMMERCIAL

## 2024-11-25 DIAGNOSIS — I82.4Y3 ACUTE DEEP VEIN THROMBOSIS (DVT) OF PROXIMAL END OF BOTH LOWER EXTREMITIES (H): ICD-10-CM

## 2024-11-25 DIAGNOSIS — R76.0 ANTIPHOSPHOLIPID ANTIBODY POSITIVE: ICD-10-CM

## 2024-11-25 DIAGNOSIS — Z79.01 LONG TERM CURRENT USE OF ANTICOAGULANTS WITH INR GOAL OF 2.0-3.0: ICD-10-CM

## 2024-11-25 DIAGNOSIS — I82.491 ACUTE DEEP VEIN THROMBOSIS (DVT) OF OTHER SPECIFIED VEIN OF RIGHT LOWER EXTREMITY (H): ICD-10-CM

## 2024-11-25 LAB — INR PPP: 2.16 (ref 0.85–1.15)

## 2024-11-25 PROCEDURE — 85610 PROTHROMBIN TIME: CPT

## 2024-11-25 PROCEDURE — 36415 COLL VENOUS BLD VENIPUNCTURE: CPT

## 2024-11-26 ENCOUNTER — ANTICOAGULATION THERAPY VISIT (OUTPATIENT)
Dept: ANTICOAGULATION | Facility: CLINIC | Age: 29
End: 2024-11-26
Payer: COMMERCIAL

## 2024-11-26 DIAGNOSIS — Z79.01 LONG TERM CURRENT USE OF ANTICOAGULANTS WITH INR GOAL OF 2.0-3.0: ICD-10-CM

## 2024-11-26 DIAGNOSIS — R76.0 ANTIPHOSPHOLIPID ANTIBODY POSITIVE: Primary | ICD-10-CM

## 2024-11-26 DIAGNOSIS — I82.4Y3 ACUTE DEEP VEIN THROMBOSIS (DVT) OF PROXIMAL END OF BOTH LOWER EXTREMITIES (H): ICD-10-CM

## 2024-11-26 DIAGNOSIS — I82.491 ACUTE DEEP VEIN THROMBOSIS (DVT) OF OTHER SPECIFIED VEIN OF RIGHT LOWER EXTREMITY (H): ICD-10-CM

## 2024-11-26 NOTE — PROGRESS NOTES
ANTICOAGULATION MANAGEMENT     Ronna YENI Coleman 28 year old female is on warfarin with therapeutic INR result. (Goal INR 2.0-3.0)    Recent labs: (last 7 days)     11/25/24  1451   INR 2.16*       ASSESSMENT     Warfarin Lab Questionnaire    Warfarin Doses Last 7 Days      11/25/2024     1:17 PM   Dose in Tablet or Mg   TAB or MG? tablet (tab)     Pt Rptd Dose SUNDAY MONDAY TUESDAY WED THURS FRIDAY SATURDAY 11/25/2024   1:17 PM 2 1.5 2 1.5 2 1.5 2         11/25/2024   Warfarin Lab Questionnaire   Missed doses within past 14 days? No   Changes in diet or alcohol within past 14 days? No   Medication changes since last result? No   Injuries or illness since last result? No   New shortness of breath, severe headaches or sudden changes in vision since last result? No   Abnormal bleeding since last result? No   Upcoming surgery, procedure? No        Previous result: Subtherapeutic  Additional findings: None       PLAN     Recommended plan for no diet, medication or health factor changes affecting INR     Dosing Instructions: Continue your current warfarin dose with next INR in 3 weeks       Summary  As of 11/26/2024      Full warfarin instructions:  7.5 mg every Mon, Wed, Fri; 10 mg all other days   Next INR check:  12/16/2024               Telephone call with Ronna who verbalizes understanding and agrees to plan and who agrees to plan and repeated back plan correctly    Lab visit scheduled- advised patient that [facility] will not get result until next day so to stay on same dose of warfarin and [facility] will call next day with dosing.    Education provided: Contact [phone] with any changes, questions or concerns.     Plan made per [facility] anticoagulation protocol    Michelle Ye, RN  11/26/2024  Anticoagulation Clinic  [facility] for routing messages: p [facility] [place]  [facility] patient phone line: [phone]        _______________________________________________________________________     Anticoagulation Episode Summary        Current INR goal:  2.0-3.0   TTR:  39.7% (8.7 mo)   Target end date:  Indefinite   Send INR reminders to:  [facility] [place]    Indications    Antiphospholipid antibody positive [R76.0]  Long term current use of anticoagulants with INR goal of 2.0-3.0 [Z79.01]  Acute deep vein thrombosis (DVT) of proximal end of both lower extremities (H) [I82.4Y3]  Acute deep vein thrombosis (DVT) of other specified vein of right lower extremity (H) [I82.491]             Comments:  --             Anticoagulation Care Providers       Provider Role Specialty Phone number    Jose Tierney MD Referring Family Medicine [phone]    Wendy Jacob APRN CNP Referring Family Medicine [phone]

## 2024-12-01 DIAGNOSIS — I82.491 ACUTE DEEP VEIN THROMBOSIS (DVT) OF OTHER SPECIFIED VEIN OF RIGHT LOWER EXTREMITY (H): ICD-10-CM

## 2024-12-01 DIAGNOSIS — Z79.01 LONG TERM CURRENT USE OF ANTICOAGULANTS WITH INR GOAL OF 2.0-3.0: ICD-10-CM

## 2024-12-01 DIAGNOSIS — R76.0 ANTIPHOSPHOLIPID ANTIBODY POSITIVE: ICD-10-CM

## 2024-12-02 RX ORDER — WARFARIN SODIUM 5 MG/1
TABLET ORAL
Qty: 150 TABLET | Refills: 1 | Status: SHIPPED | OUTPATIENT
Start: 2024-12-02

## 2024-12-02 NOTE — TELEPHONE ENCOUNTER
ANTICOAGULATION MANAGEMENT:  Medication Refill    Anticoagulation Summary  As of 11/26/2024      Warfarin maintenance plan:  7.5 mg (5 mg x 1.5) every Mon, Wed, Fri; 10 mg (5 mg x 2) all other days   Next INR check:  12/16/2024   Target end date:  Indefinite    Indications    Antiphospholipid antibody positive [R76.0]  Long term current use of anticoagulants with INR goal of 2.0-3.0 [Z79.01]  Acute deep vein thrombosis (DVT) of proximal end of both lower extremities (H) [I82.4Y3]  Acute deep vein thrombosis (DVT) of other specified vein of right lower extremity (H) [I82.491]                 Anticoagulation Care Providers       Provider Role Specialty Phone number    Jose Tierney MD Referring Family Medicine [phone]    Wendy Jacob APRN CNP Referring Family Medicine [phone]            Refill Criteria    Visit with referring provider/group: Meets criteria: visit within referring provider group in the last 15 months on 9/27/24    [facility] referral last signed: 04/02/2024; within last year:  Yes    Lab monitoring not exceeding 2 weeks overdue: Yes    Ronna meets all criteria for refill. Rx instructions and quantity supplied updated to match patient's current dosing plan. Warfarin 90 day supply with 1 refill granted per [facility] protocol     Delmy Bello RN  Anticoagulation Clinic

## 2024-12-23 ENCOUNTER — TELEPHONE (OUTPATIENT)
Dept: ANTICOAGULATION | Facility: CLINIC | Age: 29
End: 2024-12-23
Payer: COMMERCIAL

## 2024-12-23 NOTE — TELEPHONE ENCOUNTER
ANTICOAGULATION     Ronna Coleman is overdue for an INR check.     Left message for patient to call and schedule lab appointment as soon as possible. If returning call, please schedule.     Anupama Lipscomb RN  12/23/2024  Anticoagulation Clinic  [facility] for routing messages: p [facility]AG [place]  [facility] patient phone line: [phone]

## 2024-12-30 ENCOUNTER — MYC MEDICAL ADVICE (OUTPATIENT)
Dept: ANTICOAGULATION | Facility: CLINIC | Age: 29
End: 2024-12-30
Payer: COMMERCIAL

## 2025-01-06 ENCOUNTER — DOCUMENTATION ONLY (OUTPATIENT)
Dept: ANTICOAGULATION | Facility: CLINIC | Age: 30
End: 2025-01-06
Payer: COMMERCIAL

## 2025-01-06 NOTE — LETTER
"     [facility] ANTICOAGULATION CLINIC  [address] [facility] [address]  Phone: [phone]  Fax: [phone]   January 6, 2025        Ronna Coleman  [address] [place] [address]  [place]E [facility] [address]            Dear Ronna,    You are currently under the care of [facility] for your warfarin (Coumadin , Jantoven ) therapy.  We are contacting you because our records show you were due for an INR on 12/16/2024.    There are potentially serious risks when taking warfarin without careful monitoring and we want to make sure you are safely managed.  Routine lab monitoring is required for warfarin refills.     Please schedule a lab appointment as soon as possible either by calling [phone] or scheduling directly in [employer]. To schedule in [employer] open the \"schedule an appointment section\"  then select \"lab only\" as the reason you are coming in and \"INR\" as the lab test. If it is difficult for you to get to lab, please call us to discuss options.  If there has been a change in your care or other concerns, please let us know so we can help and/or update our records.         Sincerely,       [facility]    "

## 2025-01-06 NOTE — PROGRESS NOTES
ANTICOAGULATION     Ronna Coleman is overdue for an INR check.     Reminder letter sent    Michelle Ye RN  1/6/2025  Anticoagulation Clinic  [facility] for routing messages: p ANTICOAG [place] [facility] patient phone line: [phone]

## 2025-01-20 ENCOUNTER — LAB (OUTPATIENT)
Dept: LAB | Facility: CLINIC | Age: 30
End: 2025-01-20
Payer: COMMERCIAL

## 2025-01-20 DIAGNOSIS — I82.491 ACUTE DEEP VEIN THROMBOSIS (DVT) OF OTHER SPECIFIED VEIN OF RIGHT LOWER EXTREMITY (H): ICD-10-CM

## 2025-01-20 DIAGNOSIS — I82.4Y3 ACUTE DEEP VEIN THROMBOSIS (DVT) OF PROXIMAL END OF BOTH LOWER EXTREMITIES (H): ICD-10-CM

## 2025-01-20 DIAGNOSIS — R76.0 ANTIPHOSPHOLIPID ANTIBODY POSITIVE: ICD-10-CM

## 2025-01-20 DIAGNOSIS — Z79.01 LONG TERM CURRENT USE OF ANTICOAGULANTS WITH INR GOAL OF 2.0-3.0: ICD-10-CM

## 2025-01-20 PROCEDURE — 36415 COLL VENOUS BLD VENIPUNCTURE: CPT

## 2025-01-20 PROCEDURE — 85610 PROTHROMBIN TIME: CPT

## 2025-01-21 ENCOUNTER — ANTICOAGULATION THERAPY VISIT (OUTPATIENT)
Dept: ANTICOAGULATION | Facility: CLINIC | Age: 30
End: 2025-01-21
Payer: COMMERCIAL

## 2025-01-21 DIAGNOSIS — R76.0 ANTIPHOSPHOLIPID ANTIBODY POSITIVE: Primary | ICD-10-CM

## 2025-01-21 DIAGNOSIS — Z79.01 LONG TERM CURRENT USE OF ANTICOAGULANTS WITH INR GOAL OF 2.0-3.0: ICD-10-CM

## 2025-01-21 DIAGNOSIS — I82.491 ACUTE DEEP VEIN THROMBOSIS (DVT) OF OTHER SPECIFIED VEIN OF RIGHT LOWER EXTREMITY (H): ICD-10-CM

## 2025-01-21 DIAGNOSIS — I82.4Y3 ACUTE DEEP VEIN THROMBOSIS (DVT) OF PROXIMAL END OF BOTH LOWER EXTREMITIES (H): ICD-10-CM

## 2025-01-21 LAB — INR PPP: 2 (ref 0.85–1.15)

## 2025-01-21 NOTE — PROGRESS NOTES
ANTICOAGULATION MANAGEMENT     Ronna Coleman 29 year old female is on warfarin with therapeutic INR result. (Goal INR 2.0-3.0)    Recent labs: (last 7 days)     01/20/25  1516   INR 2.00*       ASSESSMENT     Warfarin Lab Questionnaire    Warfarin Doses Last 7 Days      1/20/2025     3:07 PM   Dose in Tablet or Mg   TAB or MG? tablet (tab)     Pt Rptd Dose SUNDAY MONDAY TUESDAY WED THURS FRIDAY SATURDAY 1/20/2025   3:07 PM 2 1.5 2 1.5 2 1.5 2         1/20/2025   Warfarin Lab Questionnaire   Missed doses within past 14 days? No   Changes in diet or alcohol within past 14 days? No   Medication changes since last result? No   Injuries or illness since last result? No   New shortness of breath, severe headaches or sudden changes in vision since last result? No   Abnormal bleeding since last result? No   Upcoming surgery, procedure? No     Previous result: Therapeutic last visit; previously outside of goal range  Additional findings: None       PLAN     Recommended plan for no diet, medication or health factor changes affecting INR     Dosing Instructions: Continue your current warfarin dose with next INR in 6 weeks       Summary  As of 1/21/2025      Full warfarin instructions:  7.5 mg every Mon, Wed, Fri; 10 mg all other days   Next INR check:  3/4/2025               Detailed voice message left for Ronna with dosing instructions and follow up date.   Sent [employer] message with dosing and follow up instructions    Contact [phone] to schedule and with any changes, questions or concerns.     Education provided: Please call back if any changes to your diet, medications or how you've been taking warfarin    Plan made per [facility] anticoagulation protocol    Michelle Ye, RN  1/21/2025  Anticoagulation Clinic  [facility] for routing messages: p ANTICOAG [place] [facility] patient phone line: [phone]        _______________________________________________________________________     Anticoagulation Episode Summary        Current INR goal:  2.0-3.0   TTR:  50.3% (10.6 mo)   Target end date:  Indefinite   Send INR reminders to:  [facility] [place]    Indications    Antiphospholipid antibody positive [R76.0]  Long term current use of anticoagulants with INR goal of 2.0-3.0 [Z79.01]  Acute deep vein thrombosis (DVT) of proximal end of both lower extremities (H) [I82.4Y3]  Acute deep vein thrombosis (DVT) of other specified vein of right lower extremity (H) [I82.491]             Comments:  --             Anticoagulation Care Providers       Provider Role Specialty Phone number    Jose Tierney MD Referring Family Medicine [phone]    Wendy Jacob APRN CNP Referring Family Medicine [phone]

## 2025-03-11 ENCOUNTER — MYC MEDICAL ADVICE (OUTPATIENT)
Dept: ANTICOAGULATION | Facility: CLINIC | Age: 30
End: 2025-03-11
Payer: COMMERCIAL

## 2025-03-18 ENCOUNTER — TELEPHONE (OUTPATIENT)
Dept: ANTICOAGULATION | Facility: CLINIC | Age: 30
End: 2025-03-18
Payer: COMMERCIAL

## 2025-03-18 NOTE — TELEPHONE ENCOUNTER
ANTICOAGULATION     Ronna Coleman is overdue for an INR check.     Left message for patient to call and schedule lab appointment as soon as possible. If returning call, please schedule.     Michelle Ye, RN  3/18/2025  Anticoagulation Clinic  [facility] for routing messages: p [facility]AG [place]  [facility] patient phone line: [phone]

## 2025-03-25 ENCOUNTER — DOCUMENTATION ONLY (OUTPATIENT)
Dept: ANTICOAGULATION | Facility: CLINIC | Age: 30
End: 2025-03-25
Payer: COMMERCIAL

## 2025-03-25 NOTE — LETTER
"     [facility] ANTICOAGULATION CLINIC  [address] [facility] [address]  Phone: [phone]  Fax: [phone]   March 25, 2025        Ronna Coleman  [address] [place] [address]  [place] [facility] [address]            Dear Ronna,    You are currently under the care of [facility] for your warfarin (Coumadin , Jantoven ) therapy.  We are contacting you because our records show you were due for an INR on 3/4/25.    There are potentially serious risks when taking warfarin without careful monitoring and we want to make sure you are safely managed.  Routine lab monitoring is required for warfarin refills.     Please schedule a lab appointment as soon as possible either by calling [phone] or scheduling directly in [employer]. To schedule in [employer] open the \"schedule an appointment\" section then select \"lab only\" as the reason you are coming in and \"INR\" as the lab test. If it is difficult for you to get to lab, please call us to discuss options.  If there has been a change in your care or other concerns, please let us know so we can help and/or update our records.         Sincerely,       [facility]    "

## 2025-03-25 NOTE — PROGRESS NOTES
ANTICOAGULATION     Ronna Coleman is overdue for an INR check.     Reminder letter sent    Michelle Ye RN  3/25/2025  Anticoagulation Clinic  [facility] for routing messages: p AN[facility]AG [place] [facility] patient phone line: [phone]

## 2025-04-08 ENCOUNTER — DOCUMENTATION ONLY (OUTPATIENT)
Dept: ANTICOAGULATION | Facility: CLINIC | Age: 30
End: 2025-04-08
Payer: COMMERCIAL

## 2025-04-08 NOTE — PROGRESS NOTES
This RN attempted to reach patient on 4/8/25.  Left message to return call.      If they call back:    Please ask patient why she is not following up with the INR clinic pre provider's request.  Patient was supposed to follow up with INR clinic on 3/4/25 and has not been able to be reached.       Kristina Kjellberg, MSN, RN

## 2025-04-08 NOTE — PROGRESS NOTES
Anticoagulation Clinic Notification    Ronna, is past due for an INR. Their last result was 2.00 on 1/21/25 and was due to come back on 3/4/25.    She received phone calls and letters over the last several weeks in attempt to arrange follow up labs. Ronna Coleman will be contacted again today.     Please contact patient directly to discuss compliance with monitoring or schedule visit to review ongoing anticoagulation therapy.    Thank you,     [facility] Anticoagulation Clinic

## 2025-04-09 ENCOUNTER — DOCUMENTATION ONLY (OUTPATIENT)
Dept: FAMILY MEDICINE | Facility: CLINIC | Age: 30
End: 2025-04-09
Payer: COMMERCIAL

## 2025-04-09 DIAGNOSIS — R76.0 ANTIPHOSPHOLIPID ANTIBODY POSITIVE: Primary | ICD-10-CM

## 2025-04-09 DIAGNOSIS — I82.491 ACUTE DEEP VEIN THROMBOSIS (DVT) OF OTHER SPECIFIED VEIN OF RIGHT LOWER EXTREMITY (H): ICD-10-CM

## 2025-04-09 DIAGNOSIS — Z79.01 LONG TERM CURRENT USE OF ANTICOAGULANTS WITH INR GOAL OF 2.0-3.0: ICD-10-CM

## 2025-04-09 NOTE — PROGRESS NOTES
Second attempt. Left message for patient to return call to clinic per Jose Tierney MD:    Please ask patient why she is not following up with the INR clinic pre provider's request.  Patient was supposed to follow up with INR clinic on 3/4/25 and has not been able to be reached.     RN, if/when patient returns call, please review message as shown. Bjorn Aggarwal, RN, BSN, PHN

## 2025-04-09 NOTE — PROGRESS NOTES
Please place orders for a lab appt scheduled 4/9/25. Per notes, it is for an INR, Point of care or venous?

## 2025-04-10 NOTE — PROGRESS NOTES
Third attempt. Left message for patient to return call to clinic per Jose Tierney MD:     Please ask patient why she is not following up with the INR clinic pre provider's request.  Patient was supposed to follow up with INR clinic on 3/4/25 and has not been able to be reached.      Multiple attempts have been made to reach patient about this concern. Routing to provider, please advise on how you would like us to proceed. Bjorn Aggarwal, RN, BSN, PHN

## 2025-04-24 ENCOUNTER — LAB (OUTPATIENT)
Dept: LAB | Facility: CLINIC | Age: 30
End: 2025-04-24
Payer: COMMERCIAL

## 2025-04-24 DIAGNOSIS — I82.491 ACUTE DEEP VEIN THROMBOSIS (DVT) OF OTHER SPECIFIED VEIN OF RIGHT LOWER EXTREMITY (H): ICD-10-CM

## 2025-04-24 DIAGNOSIS — R76.0 ANTIPHOSPHOLIPID ANTIBODY POSITIVE: ICD-10-CM

## 2025-04-24 DIAGNOSIS — Z79.01 LONG TERM CURRENT USE OF ANTICOAGULANTS WITH INR GOAL OF 2.0-3.0: ICD-10-CM

## 2025-04-24 PROCEDURE — 36415 COLL VENOUS BLD VENIPUNCTURE: CPT

## 2025-04-24 PROCEDURE — 85610 PROTHROMBIN TIME: CPT

## 2025-04-25 ENCOUNTER — TELEPHONE (OUTPATIENT)
Dept: ANTICOAGULATION | Facility: CLINIC | Age: 30
End: 2025-04-25
Payer: COMMERCIAL

## 2025-04-25 DIAGNOSIS — R76.0 ANTIPHOSPHOLIPID ANTIBODY POSITIVE: ICD-10-CM

## 2025-04-25 DIAGNOSIS — I82.4Y3 ACUTE DEEP VEIN THROMBOSIS (DVT) OF PROXIMAL END OF BOTH LOWER EXTREMITIES (H): Primary | ICD-10-CM

## 2025-04-25 LAB — INR PPP: 5.33 (ref 0.85–1.15)

## 2025-04-30 ENCOUNTER — LAB (OUTPATIENT)
Dept: LAB | Facility: CLINIC | Age: 30
End: 2025-04-30
Payer: COMMERCIAL

## 2025-04-30 DIAGNOSIS — I82.4Y3 ACUTE DEEP VEIN THROMBOSIS (DVT) OF PROXIMAL END OF BOTH LOWER EXTREMITIES (H): ICD-10-CM

## 2025-04-30 DIAGNOSIS — R76.0 ANTIPHOSPHOLIPID ANTIBODY POSITIVE: ICD-10-CM

## 2025-04-30 PROCEDURE — 85610 PROTHROMBIN TIME: CPT

## 2025-04-30 PROCEDURE — 36415 COLL VENOUS BLD VENIPUNCTURE: CPT

## 2025-05-01 ENCOUNTER — ANTICOAGULATION THERAPY VISIT (OUTPATIENT)
Dept: ANTICOAGULATION | Facility: CLINIC | Age: 30
End: 2025-05-01
Payer: COMMERCIAL

## 2025-05-01 DIAGNOSIS — I82.491 ACUTE DEEP VEIN THROMBOSIS (DVT) OF OTHER SPECIFIED VEIN OF RIGHT LOWER EXTREMITY (H): ICD-10-CM

## 2025-05-01 DIAGNOSIS — I82.4Y3 ACUTE DEEP VEIN THROMBOSIS (DVT) OF PROXIMAL END OF BOTH LOWER EXTREMITIES (H): ICD-10-CM

## 2025-05-01 DIAGNOSIS — R76.0 ANTIPHOSPHOLIPID ANTIBODY POSITIVE: Primary | ICD-10-CM

## 2025-05-01 DIAGNOSIS — Z79.01 LONG TERM CURRENT USE OF ANTICOAGULANTS WITH INR GOAL OF 2.0-3.0: ICD-10-CM

## 2025-05-01 LAB
INR PPP: 1.62 (ref 0.85–1.15)
PROTHROMBIN TIME: 19.3 SECONDS (ref 11.8–14.8)

## 2025-05-01 NOTE — PROGRESS NOTES
ANTICOAGULATION MANAGEMENT     Ronna Coleman 29 year old female is on warfarin with subtherapeutic INR result. (Goal INR 2.0-3.0)    Recent labs: (last 7 days)     04/30/25  1545   INR 1.62*       ASSESSMENT     Warfarin Lab Questionnaire    Warfarin Doses Last 7 Days    Pt Rptd Dose SUNDAY MONDAY TUESDAY WED THURS FRIDAY SATURDAY 4/29/2025   8:02 PM 1.5 1.5 1.5 1.5 2 0 0         4/29/2025   Warfarin Lab Questionnaire   Missed doses within past 14 days? Held doses - 2 as instructed    Changes in diet or alcohol within past 14 days? No   Medication changes since last result? No   Injuries or illness since last result? No   New shortness of breath, severe headaches or sudden changes in vision since last result? No   Abnormal bleeding since last result? No   Upcoming surgery, procedure? No     Previous result: Supratherapeutic  Additional findings: None     PLAN     Recommended plan for temporary change(s) affecting INR     Dosing Instructions: Continue your current warfarin dose with next INR in 1 week      Pt will check on Thursday due to schedule. Pt will continue maintenance dose until INR result. [facility] will most likely receive result Friday 5/9.     Summary  As of 5/1/2025      Full warfarin instructions:  7.5 mg every day   Next INR check:  5/8/2025               Telephone call with Ronna who verbalizes understanding and agrees to plan    Lab visit scheduled    Education provided: Please call back if any changes to your diet, medications or how you've been taking warfarin  Symptom monitoring: monitoring for clotting signs and symptoms, monitoring for stroke signs and symptoms, and when to seek medical attention/emergency care    Plan made per [facility] anticoagulation protocol    Na Brito RN  5/1/2025  Anticoagulation Clinic  [facility] for routing messages: p ANTICOAG [place] [facility] patient phone line: [phone]        _______________________________________________________________________      Anticoagulation Episode Summary       Current INR goal:  2.0-3.0   TTR:  50.3% (9 mo)   Target end date:  Indefinite   Send INR reminders to:  [facility] [place]    Indications    Antiphospholipid antibody positive [R76.0]  Long term current use of anticoagulants with INR goal of 2.0-3.0 [Z79.01]  Acute deep vein thrombosis (DVT) of proximal end of both lower extremities (H) [I82.4Y3]  Acute deep vein thrombosis (DVT) of other specified vein of right lower extremity (H) [I82.491]             Comments:  --             Anticoagulation Care Providers       Provider Role Specialty Phone number    Jose Tierney MD Referring Family Medicine [phone]    Wendy Jacob APRN CNP Referring Family Medicine [phone]

## 2025-05-14 ENCOUNTER — LAB (OUTPATIENT)
Dept: LAB | Facility: CLINIC | Age: 30
End: 2025-05-14
Payer: COMMERCIAL

## 2025-05-14 DIAGNOSIS — R76.0 ANTIPHOSPHOLIPID ANTIBODY POSITIVE: ICD-10-CM

## 2025-05-14 DIAGNOSIS — I82.4Y3 ACUTE DEEP VEIN THROMBOSIS (DVT) OF PROXIMAL END OF BOTH LOWER EXTREMITIES (H): ICD-10-CM

## 2025-05-14 PROCEDURE — 36415 COLL VENOUS BLD VENIPUNCTURE: CPT

## 2025-05-14 PROCEDURE — 85610 PROTHROMBIN TIME: CPT

## 2025-05-15 LAB
INR PPP: 3.25 (ref 0.85–1.15)
PROTHROMBIN TIME: 32.7 SECONDS (ref 11.8–14.8)

## 2025-05-16 ENCOUNTER — RESULTS FOLLOW-UP (OUTPATIENT)
Dept: ANTICOAGULATION | Facility: CLINIC | Age: 30
End: 2025-05-16

## 2025-06-05 ENCOUNTER — MYC MEDICAL ADVICE (OUTPATIENT)
Dept: ANTICOAGULATION | Facility: CLINIC | Age: 30
End: 2025-06-05
Payer: COMMERCIAL

## 2025-06-10 ENCOUNTER — DOCUMENTATION ONLY (OUTPATIENT)
Dept: ANTICOAGULATION | Facility: CLINIC | Age: 30
End: 2025-06-10
Payer: COMMERCIAL

## 2025-06-10 NOTE — LETTER
"     [facility] ANTICOAGULATION CLINIC  [address] [facility] [address]  Phone: [phone]  Fax: [phone]   Nancy 10, 2025        Ronna Coleman  [address] [place] [address]  [place] [facility] [address]            Dear Ronna,    You are currently under the care of [facility] for your warfarin (Coumadin , Jantoven ) therapy.  We are contacting you because our records show you were due for an INR on 5/28/25.    There are potentially serious risks when taking warfarin without careful monitoring and we want to make sure you are safely managed.  Routine lab monitoring is required for warfarin refills.     Please schedule a lab appointment as soon as possible either by calling [phone] or scheduling directly in [employer]. To schedule in [employer] open the \"schedule an appointment\" section then select \"lab only\" as the reason you are coming in and \"INR\" as the lab test. If it is difficult for you to get to lab, please call us to discuss options.  If there has been a change in your care or other concerns, please let us know so we can help and/or update our records.         Sincerely,       [facility]    "

## 2025-06-10 NOTE — PROGRESS NOTES
ANTICOAGULATION     Ronna Coleman is overdue for an INR check.     Reminder letter sent    Michelle Ye RN  6/10/2025  Anticoagulation Clinic  [facility] for routing messages: p [facility] [place] [facility] patient phone line: [phone]

## 2025-06-24 ENCOUNTER — DOCUMENTATION ONLY (OUTPATIENT)
Dept: ANTICOAGULATION | Facility: CLINIC | Age: 30
End: 2025-06-24
Payer: COMMERCIAL

## 2025-06-24 NOTE — LETTER
"     [facility] ANTICOAGULATION CLINIC  [address] [facility] [address]  Phone: [phone]  Fax: [phone]   June 24, 2025        Ronna Coleman  [address] [place] [address]  [place] [facility] [address]            Dear Ronna,    You are currently under the care of [facility] for your warfarin (Coumadin , Jantoven ) therapy.  We are contacting you because our records show you were due for an INR on 5/28/25.    There are potentially serious risks when taking warfarin without careful monitoring and we want to make sure you are safely managed.  Routine lab monitoring is required for warfarin refills.     Please schedule a lab appointment as soon as possible either by calling [phone] or scheduling directly in [employer]. To schedule in [employer] open the \"schedule an appointment\" section then select \"lab only\" as the reason you are coming in and \"INR\" as the lab test. If it is difficult for you to get to lab, please call us to discuss options.  If there has been a change in your care or other concerns, please let us know so we can help and/or update our records.         Sincerely,       [facility] Anticoagulation Clinic    "

## 2025-06-24 NOTE — PROGRESS NOTES
Anticoagulation Clinic Notification    Ronna, is past due for an INR. Their last result was 3.25 on 5/14/25 and was due to come back on 5/28/25.    She received phone calls and letters over the last several weeks in attempt to arrange follow up labs. Ronna Coleman will be contacted again today.     Please contact patient directly to discuss compliance with monitoring or schedule visit to review ongoing anticoagulation therapy.    Thank you,     [facility] Anticoagulation Clinic

## 2025-07-24 ENCOUNTER — LAB (OUTPATIENT)
Dept: LAB | Facility: CLINIC | Age: 30
End: 2025-07-24
Payer: COMMERCIAL

## 2025-07-24 DIAGNOSIS — R76.0 ANTIPHOSPHOLIPID ANTIBODY POSITIVE: ICD-10-CM

## 2025-07-24 DIAGNOSIS — I82.4Y3 ACUTE DEEP VEIN THROMBOSIS (DVT) OF PROXIMAL END OF BOTH LOWER EXTREMITIES (H): ICD-10-CM

## 2025-08-07 ENCOUNTER — LAB (OUTPATIENT)
Dept: LAB | Facility: CLINIC | Age: 30
End: 2025-08-07
Payer: COMMERCIAL

## 2025-08-07 DIAGNOSIS — R76.0 ANTIPHOSPHOLIPID ANTIBODY POSITIVE: ICD-10-CM

## 2025-08-07 DIAGNOSIS — I82.4Y3 ACUTE DEEP VEIN THROMBOSIS (DVT) OF PROXIMAL END OF BOTH LOWER EXTREMITIES (H): ICD-10-CM

## 2025-08-28 ENCOUNTER — PATIENT OUTREACH (OUTPATIENT)
Dept: CARE COORDINATION | Facility: CLINIC | Age: 30
End: 2025-08-28
Payer: COMMERCIAL

## 2025-08-28 ENCOUNTER — MYC MEDICAL ADVICE (OUTPATIENT)
Dept: NURSING | Facility: CLINIC | Age: 30
End: 2025-08-28
Payer: COMMERCIAL

## 2025-09-04 ENCOUNTER — TELEPHONE (OUTPATIENT)
Dept: ANTICOAGULATION | Facility: CLINIC | Age: 30
End: 2025-09-04
Payer: COMMERCIAL